# Patient Record
Sex: FEMALE | Race: WHITE | NOT HISPANIC OR LATINO | Employment: FULL TIME | URBAN - METROPOLITAN AREA
[De-identification: names, ages, dates, MRNs, and addresses within clinical notes are randomized per-mention and may not be internally consistent; named-entity substitution may affect disease eponyms.]

---

## 2017-07-19 ENCOUNTER — EMPLOYEE HEALTH (OUTPATIENT)
Dept: OCCUPATIONAL MEDICINE | Facility: CLINIC | Age: 22
End: 2017-07-19

## 2017-07-19 ENCOUNTER — EH NON-PROVIDER (OUTPATIENT)
Dept: OCCUPATIONAL MEDICINE | Facility: CLINIC | Age: 22
End: 2017-07-19

## 2017-07-19 ENCOUNTER — HOSPITAL ENCOUNTER (OUTPATIENT)
Facility: MEDICAL CENTER | Age: 22
End: 2017-07-19
Attending: PREVENTIVE MEDICINE
Payer: COMMERCIAL

## 2017-07-19 VITALS
DIASTOLIC BLOOD PRESSURE: 66 MMHG | WEIGHT: 128 LBS | RESPIRATION RATE: 16 BRPM | SYSTOLIC BLOOD PRESSURE: 108 MMHG | TEMPERATURE: 98.7 F | OXYGEN SATURATION: 98 % | BODY MASS INDEX: 24.17 KG/M2 | HEART RATE: 73 BPM | HEIGHT: 61 IN

## 2017-07-19 DIAGNOSIS — Z02.89 VISIT FOR OCCUPATIONAL HEALTH EXAMINATION: ICD-10-CM

## 2017-07-19 DIAGNOSIS — Z02.1 PRE-EMPLOYMENT DRUG SCREENING: ICD-10-CM

## 2017-07-19 LAB
AMP AMPHETAMINE: NORMAL
BAR BARBITURATES: NORMAL
BZO BENZODIAZEPINES: NORMAL
COC COCAINE: NORMAL
INT CON NEG: NORMAL
INT CON POS: NORMAL
MDMA ECSTASY: NORMAL
MET METHAMPHETAMINES: NORMAL
MTD METHADONE: NORMAL
OPI OPIATES: NORMAL
OXY OXYCODONE: NORMAL
PCP PHENCYCLIDINE: NORMAL
POC URINE DRUG SCREEN OCDRS: NEGATIVE
THC: NORMAL

## 2017-07-19 PROCEDURE — 80305 DRUG TEST PRSMV DIR OPT OBS: CPT | Performed by: PREVENTIVE MEDICINE

## 2017-07-19 PROCEDURE — 86480 TB TEST CELL IMMUN MEASURE: CPT | Performed by: PREVENTIVE MEDICINE

## 2017-07-19 PROCEDURE — 94375 RESPIRATORY FLOW VOLUME LOOP: CPT | Performed by: PREVENTIVE MEDICINE

## 2017-07-19 PROCEDURE — 8915 PR COMPREHENSIVE PHYSICAL: Performed by: PREVENTIVE MEDICINE

## 2017-07-19 NOTE — MR AVS SNAPSHOT
[facility]   2017 9:10 AM   Employee Health   MRN: [id number]    Department:  [facility]   Dept Phone:  [phone]    Description:  Female : 1995   Provider:  Skip Milner M.D.           Allergies as of 2017     Allergen Noted Reactions    Tetracyclines 2014   Rash      You were diagnosed with     Visit for occupational health examination   [[id number]]         Vital Signs     Smoking Status                   Never Smoker            Basic Information     Date Of Birth Sex Race Ethnicity Preferred Language    1995 Female White Non- English      Problem List              ICD-10-CM Priority Class Noted - Resolved    Asthma in adult J45.909   2014 - Present    Deviated nasal septum J34.2   8/3/2015 - Present    Chronic maxillary sinusitis J32.0   8/3/2015 - Present    Chronic ethmoidal sinusitis J32.2   8/3/2015 - Present    Hypertrophy of nasal turbinates J34.3   8/3/2015 - Present    Cyst of bone, localized M85.69   8/3/2015 - Present      Health Maintenance        Date Due Completion Dates    IMM HEP B VACCINE (1 of 3 - Primary Series) 1994 ---    IMM HEP A VACCINE (1 of 2 - Standard Series) 1995 ---    IMM HPV VACCINE (1 of 3 - Female 3 Dose Series) 2005 ---    IMM VARICELLA (CHICKENPOX) VACCINE (1 of 2 - 2 Dose Adolescent Series) 2007 ---    IMM DTaP/Tdap/Td Vaccine (1 - Tdap) 2013 ---    IMM PNEUMOCOCCAL 19-64 (ADULT) MEDIUM RISK SERIES (1 of 1 - PPSV23) 2013 ---    PAP SMEAR 2015 ---    IMM INFLUENZA (1) 2017 ---            Current Immunizations     Hepatitis B Vaccine Recombivax (Adol/Adult) 1/10/2017, 2016, 2014    MMR Vaccine 1995, 10/8/1995    Tdap Vaccine 2014      Below and/or attached are the medications your provider expects you to take. Review all of your home medications and newly ordered medications with your provider and/or pharmacist. Follow medication instructions as  directed by your provider and/or pharmacist. Please keep your medication list with you and share with your provider. Update the information when medications are discontinued, doses are changed, or new medications (including over-the-counter products) are added; and carry medication information at all times in the event of emergency situations     Allergies:  TETRACYCLINES - Rash               Medications  Valid as of: July 19, 2017 -  9:54 AM    Generic Name Brand Name Tablet Size Instructions for use    Fexofenadine HCl (Tab) ALLEGRA 60 MG Take 60 mg by mouth every day.        Hydrocodone-Acetaminophen (Tab) NORCO 7.5-325 MG Take 1 Tab by mouth every 6 hours as needed for Moderate Pain.        Ondansetron (TABLET DISPERSIBLE) ZOFRAN ODT 4 MG Take 1 Tab by mouth every 6 hours as needed for Nausea/Vomiting.        .                 Medicines prescribed today were sent to:     [id number] - [place], [place] - [address]    [address] [place] [address]    Phone: [phone] Fax: [phone]    Open 24 Hours?: No      Medication refill instructions:       If your prescription bottle indicates you have medication refills left, it is not necessary to call your provider’s office. Please contact your pharmacy and they will refill your medication.    If your prescription bottle indicates you do not have any refills left, you may request refills at any time through one of the following ways: The online [facility] App system (except Urgent Care), by calling your provider’s office, or by asking your pharmacy to contact your provider’s office with a refill request. Medication refills are processed only during regular business hours and may not be available until the next business day. Your provider may request additional information or to have a follow-up visit with you prior to refilling your medication.   *Please Note: Medication refills are assigned a new Rx number when refilled electronically. Your pharmacy may  indicate that no refills were authorized even though a new prescription for the same medication is available at the pharmacy. Please request the medicine by name with the pharmacy before contacting your provider for a refill.           [employer] Access Code: Activation code not generated  Current [employer] Status: Active

## 2017-07-21 LAB
M TB TUBERC IFN-G BLD QL: NEGATIVE
M TB TUBERC IFN-G/MITOGEN IGNF BLD: 0.08
M TB TUBERC IGNF/MITOGEN IGNF CONTROL: 37.22 [IU]/ML
MITOGEN IGNF BCKGRD COR BLD-ACNC: 0.02 [IU]/ML

## 2017-09-06 ENCOUNTER — TELEPHONE (OUTPATIENT)
Dept: OCCUPATIONAL MEDICINE | Facility: CLINIC | Age: 22
End: 2017-09-06

## 2017-09-28 ENCOUNTER — HOSPITAL ENCOUNTER (OUTPATIENT)
Dept: LAB | Facility: MEDICAL CENTER | Age: 22
End: 2017-09-28
Payer: COMMERCIAL

## 2017-09-28 LAB
BDY FAT % MEASURED: 23.8 %
BP DIAS: 72 MMHG
BP SYS: 117 MMHG
CHOLEST SERPL-MCNC: 183 MG/DL (ref 100–199)
DIABETES HTDIA: NO
EVENT NAME HTEVT: NORMAL
FASTING HTFAS: YES
GLUCOSE SERPL-MCNC: 71 MG/DL (ref 65–99)
HDLC SERPL-MCNC: 51 MG/DL
HYPERTENSION HTHYP: NO
LDLC SERPL CALC-MCNC: 115 MG/DL
SCREENING LOC CITY HTCIT: NORMAL
SCREENING LOC STATE HTSTA: NORMAL
SCREENING LOCATION HTLOC: NORMAL
SMOKING HTSMO: NO
SUBSCRIBER ID HTSID: NORMAL
TRIGL SERPL-MCNC: 85 MG/DL (ref 0–149)

## 2017-09-28 PROCEDURE — 82947 ASSAY GLUCOSE BLOOD QUANT: CPT

## 2017-09-28 PROCEDURE — S5190 WELLNESS ASSESSMENT BY NONPH: HCPCS

## 2017-09-28 PROCEDURE — 36415 COLL VENOUS BLD VENIPUNCTURE: CPT

## 2017-09-28 PROCEDURE — 80061 LIPID PANEL: CPT

## 2017-09-29 ENCOUNTER — EH NON-PROVIDER (OUTPATIENT)
Dept: OCCUPATIONAL MEDICINE | Facility: CLINIC | Age: 22
End: 2017-09-29

## 2017-09-29 DIAGNOSIS — Z29.89 NEED FOR ISOLATION: ICD-10-CM

## 2017-09-29 PROCEDURE — 94375 RESPIRATORY FLOW VOLUME LOOP: CPT

## 2017-10-19 ENCOUNTER — OFFICE VISIT (OUTPATIENT)
Dept: MEDICAL GROUP | Facility: MEDICAL CENTER | Age: 22
End: 2017-10-19
Payer: COMMERCIAL

## 2017-10-19 ENCOUNTER — HOSPITAL ENCOUNTER (OUTPATIENT)
Facility: MEDICAL CENTER | Age: 22
End: 2017-10-19
Attending: INTERNAL MEDICINE
Payer: COMMERCIAL

## 2017-10-19 VITALS
OXYGEN SATURATION: 95 % | SYSTOLIC BLOOD PRESSURE: 112 MMHG | TEMPERATURE: 97.9 F | HEIGHT: 62 IN | DIASTOLIC BLOOD PRESSURE: 62 MMHG | HEART RATE: 60 BPM | BODY MASS INDEX: 23.37 KG/M2 | WEIGHT: 127 LBS

## 2017-10-19 DIAGNOSIS — Z76.89 ENCOUNTER TO ESTABLISH CARE: ICD-10-CM

## 2017-10-19 DIAGNOSIS — Z00.00 HEALTH CARE MAINTENANCE: ICD-10-CM

## 2017-10-19 DIAGNOSIS — L70.0 ACNE VULGARIS: ICD-10-CM

## 2017-10-19 DIAGNOSIS — Z11.8 SCREENING FOR CHLAMYDIAL DISEASE: ICD-10-CM

## 2017-10-19 DIAGNOSIS — Z79.899 HIGH RISK MEDICATION USE: ICD-10-CM

## 2017-10-19 DIAGNOSIS — J30.89 PERENNIAL SINUSITIS: ICD-10-CM

## 2017-10-19 LAB
INT CON NEG: NEGATIVE
INT CON POS: POSITIVE
POC URINE PREGNANCY TEST: NORMAL

## 2017-10-19 PROCEDURE — 81025 URINE PREGNANCY TEST: CPT | Performed by: INTERNAL MEDICINE

## 2017-10-19 PROCEDURE — 87491 CHLMYD TRACH DNA AMP PROBE: CPT

## 2017-10-19 PROCEDURE — 87591 N.GONORRHOEAE DNA AMP PROB: CPT

## 2017-10-19 PROCEDURE — 99214 OFFICE O/P EST MOD 30 MIN: CPT | Performed by: INTERNAL MEDICINE

## 2017-10-19 RX ORDER — ISOTRETINOIN 40 MG/1
40 CAPSULE ORAL 2 TIMES DAILY
Qty: 60 CAP | Refills: 0 | Status: ON HOLD | OUTPATIENT
Start: 2017-10-19 | End: 2018-12-04

## 2017-10-19 RX ORDER — MINOCYCLINE HYDROCHLORIDE 100 MG/1
CAPSULE ORAL
Qty: 60 CAP | Refills: 0 | Status: ON HOLD | OUTPATIENT
Start: 2017-10-19 | End: 2018-12-04

## 2017-10-19 NOTE — PROGRESS NOTES
CHIEF COMPLAINT  Chief Complaint   Patient presents with   • Referral Needed     derm     HPI  Patient is a 22 y.o. female patient who presents today for the following     Perennial allergies, sinusits  Onset: 10 years   Complains of nasal , congestion, sneezing, sinus problems throughout the year.   She had in the past, allergy testing, immunotherapy, without improvement.   On Allegra daily.   Family history of allergies: Negative    Acne  Onset:  ~6 years ago;  - Location: Mainly the face slightly on both upper arms and back  - Symptoms: Inflammatory acne  - Change with food or menstrual cycle: Unknown  Previous treatment:   - [facility] [place] 3 years ago:   -abx:    - tried doxycycline, clindamycin and topical retinoid cream w/o help   - stopped all medications 1 year ago.  She requested accutane.    Reviewed PMH, PSH, FH, SH, ALL, HCM/IMM, no changes  Reviewed MEDS, no changes    Patient Active Problem List    Diagnosis Date Noted   • Deviated nasal septum 08/03/2015   • Chronic maxillary sinusitis 08/03/2015   • Chronic ethmoidal sinusitis 08/03/2015   • Hypertrophy of nasal turbinates 08/03/2015   • Cyst of bone, localized 08/03/2015   • Asthma in adult 02/27/2014     CURRENT MEDICATIONS  Current Outpatient Prescriptions   Medication Sig Dispense Refill   • minocycline (MINOCIN) 100 MG Cap Take 1 caps BID x 10 days, then QD for 1 month 60 Cap 0   • isotretinoin (ACCUTANE) 40 MG capsule Take 1 Cap by mouth 2 times a day. 60 Cap 0   • fexofenadine (ALLEGRA) 60 MG TABS Take 60 mg by mouth every day.       No current facility-administered medications for this visit.      ALLERGIES  Allergies: Tetracyclines  PAST MEDICAL HISTORY  Past Medical History:   Diagnosis Date   • Acne    • Asthma     inhaler as needed   • Snoring     no sleep study     SURGICAL HISTORY  She  has a past surgical history that includes other (5/25/2012); septoplasty (Bilateral, 8/3/2015); turbinoplasty (Bilateral, 8/3/2015);  "ethmoidectomy (Bilateral, 8/3/2015); and antrostomy (Bilateral, 8/3/2015).  SOCIAL HISTORY  Social History   Substance Use Topics   • Smoking status: Never Smoker   • Smokeless tobacco: Never Used   • Alcohol use Not on file     Social History     Social History Narrative   • No narrative on file     FAMILY HISTORY  Family History   Problem Relation Age of Onset   • No Known Problems Mother    • No Known Problems Father      Family Status   Relation Status   • Mother Alive   • Father Alive       ROS   Constitutional: Negative for fever, chills.  HENT: as above.  Eyes: Negative for blurred vision.   Respiratory: Negative for cough, shortness of breath.  Cardiovascular: Negative for chest pain, palpitations.   Gastrointestinal: Negative for heartburn, nausea, abdominal pain.   Genitourinary: Negative for dysuria.  Musculoskeletal: Negative for significant myalgias, back pain and joint pain.   Skin: as above.  Neuro: Negative for dizziness, weakness and headaches.   Endo/Heme/Allergies: Does not bruise/bleed easily.   Psychiatric/Behavioral: Negative for depression, anxiety    PHYSICAL EXAM   Blood pressure 112/62, pulse 60, temperature 36.6 °C (97.9 °F), height 1.575 m (5' 2\"), weight 57.6 kg (127 lb), last menstrual period 10/09/2017, SpO2 95 %, not currently breastfeeding. Body mass index is 23.23 kg/m².  General:  NAD, well appearing  HEENT:   NC/AT, PERRLA, EOMI, TMs are clear. Oropharyngeal mucosa is pink,  without lesions;  no cervical / supraclavicular  lymphadenopathy, no thyromegaly.    Cardiovascular: RRR.   No m/r/g. No carotid bruits .      Lungs:   CTAB, no w/r/r, no respiratory distress.  Abdomen: Soft, NT/ND + BS; no suprapubic tenderness; no masses or hepatosplenomegaly.  Extremities:  2+ DP and radial pulses bilaterally.  No c/c/e.   Skin:  Warm, dry.    Inflammatory acne on face.  Neurologic: Alert & oriented x 3. No focal deficits.  Psychiatric:  Affect normal, mood normal, judgment normal.    LABS "     Labs are reviewed and discussed with a patient    Lab Results   Component Value Date/Time    CHOLSTRLTOT 183 09/28/2017 11:25 AM     (H) 09/28/2017 11:25 AM    HDL 51 09/28/2017 11:25 AM    TRIGLYCERIDE 85 09/28/2017 11:25 AM       Lab Results   Component Value Date/Time    GLUCOSE 71 09/28/2017 11:25 AM     IMAGING     None    ASSESMENT AND PLAN        1. Perennial sinusitis  Stable, continue current treatment, ENT follow-up    2. Acne vulgaris  Triglycerides were low  She has nexplanone    - REFERRAL TO DERMATOLOGY  - POCT Pregnancy  - COMP METABOLIC PANEL; Future  - LIPID PROFILE; Future  - CBC WITH DIFFERENTIAL; Future    3. High risk medication use  Pending labs  - Will decide about Accutane use - in a contact with pharmacy  - COMP METABOLIC PANEL; Future  - LIPID PROFILE; Future  - CBC WITH DIFFERENTIAL; Future    4. Screening for chlamydial disease  - CHLAMYDIA/GC PCR URINE OR SWAB; Future    5. Encounter to establish care  Reviewed PMH, PSH, FH, SH, ALL, MEDS, HCM/IMM.   Advised healthy habits, diet, exercise.    6. Health care maintenance  Pending records    Counseling:   - Smoking:  Nonsmoker    Followup: Return in about 2 months (around 12/19/2017) for Short.    All questions are answered.    Please note that this dictation was created using voice recognition software, and that there might be errors of ciro and possibly content.

## 2017-10-21 ENCOUNTER — HOSPITAL ENCOUNTER (OUTPATIENT)
Dept: LAB | Facility: MEDICAL CENTER | Age: 22
End: 2017-10-21
Attending: INTERNAL MEDICINE
Payer: COMMERCIAL

## 2017-10-21 DIAGNOSIS — Z79.899 HIGH RISK MEDICATION USE: ICD-10-CM

## 2017-10-21 DIAGNOSIS — Z11.8 SCREENING FOR CHLAMYDIAL DISEASE: ICD-10-CM

## 2017-10-21 DIAGNOSIS — L70.0 ACNE VULGARIS: ICD-10-CM

## 2017-10-21 LAB
ALBUMIN SERPL BCP-MCNC: 4.7 G/DL (ref 3.2–4.9)
ALBUMIN/GLOB SERPL: 1.6 G/DL
ALP SERPL-CCNC: 45 U/L (ref 30–99)
ALT SERPL-CCNC: 14 U/L (ref 2–50)
ANION GAP SERPL CALC-SCNC: 9 MMOL/L (ref 0–11.9)
AST SERPL-CCNC: 18 U/L (ref 12–45)
BASOPHILS # BLD AUTO: 0.8 % (ref 0–1.8)
BASOPHILS # BLD: 0.05 K/UL (ref 0–0.12)
BILIRUB SERPL-MCNC: 0.8 MG/DL (ref 0.1–1.5)
BUN SERPL-MCNC: 18 MG/DL (ref 8–22)
C TRACH DNA SPEC QL NAA+PROBE: NEGATIVE
CALCIUM SERPL-MCNC: 9.9 MG/DL (ref 8.5–10.5)
CHLORIDE SERPL-SCNC: 106 MMOL/L (ref 96–112)
CHOLEST SERPL-MCNC: 183 MG/DL (ref 100–199)
CO2 SERPL-SCNC: 26 MMOL/L (ref 20–33)
CREAT SERPL-MCNC: 0.9 MG/DL (ref 0.5–1.4)
EOSINOPHIL # BLD AUTO: 0.93 K/UL (ref 0–0.51)
EOSINOPHIL NFR BLD: 14.3 % (ref 0–6.9)
ERYTHROCYTE [DISTWIDTH] IN BLOOD BY AUTOMATED COUNT: 40.2 FL (ref 35.9–50)
GFR SERPL CREATININE-BSD FRML MDRD: >60 ML/MIN/1.73 M 2
GLOBULIN SER CALC-MCNC: 2.9 G/DL (ref 1.9–3.5)
GLUCOSE SERPL-MCNC: 76 MG/DL (ref 65–99)
HCT VFR BLD AUTO: 41.8 % (ref 37–47)
HDLC SERPL-MCNC: 47 MG/DL
HGB BLD-MCNC: 14.1 G/DL (ref 12–16)
IMM GRANULOCYTES # BLD AUTO: 0.01 K/UL (ref 0–0.11)
IMM GRANULOCYTES NFR BLD AUTO: 0.2 % (ref 0–0.9)
LDLC SERPL CALC-MCNC: 121 MG/DL
LYMPHOCYTES # BLD AUTO: 2.71 K/UL (ref 1–4.8)
LYMPHOCYTES NFR BLD: 41.6 % (ref 22–41)
MCH RBC QN AUTO: 31.6 PG (ref 27–33)
MCHC RBC AUTO-ENTMCNC: 33.7 G/DL (ref 33.6–35)
MCV RBC AUTO: 93.7 FL (ref 81.4–97.8)
MONOCYTES # BLD AUTO: 0.47 K/UL (ref 0–0.85)
MONOCYTES NFR BLD AUTO: 7.2 % (ref 0–13.4)
N GONORRHOEA DNA SPEC QL NAA+PROBE: NEGATIVE
NEUTROPHILS # BLD AUTO: 2.35 K/UL (ref 2–7.15)
NEUTROPHILS NFR BLD: 35.9 % (ref 44–72)
NRBC # BLD AUTO: 0 K/UL
NRBC BLD AUTO-RTO: 0 /100 WBC
PLATELET # BLD AUTO: 204 K/UL (ref 164–446)
PMV BLD AUTO: 10.8 FL (ref 9–12.9)
POTASSIUM SERPL-SCNC: 4 MMOL/L (ref 3.6–5.5)
PROT SERPL-MCNC: 7.6 G/DL (ref 6–8.2)
RBC # BLD AUTO: 4.46 M/UL (ref 4.2–5.4)
SODIUM SERPL-SCNC: 141 MMOL/L (ref 135–145)
SPECIMEN SOURCE: NORMAL
TRIGL SERPL-MCNC: 75 MG/DL (ref 0–149)
WBC # BLD AUTO: 6.5 K/UL (ref 4.8–10.8)

## 2017-10-21 PROCEDURE — 80053 COMPREHEN METABOLIC PANEL: CPT

## 2017-10-21 PROCEDURE — 85025 COMPLETE CBC W/AUTO DIFF WBC: CPT

## 2017-10-21 PROCEDURE — 36415 COLL VENOUS BLD VENIPUNCTURE: CPT

## 2017-10-21 PROCEDURE — 80061 LIPID PANEL: CPT

## 2017-10-24 ENCOUNTER — TELEPHONE (OUTPATIENT)
Dept: MEDICAL GROUP | Facility: MEDICAL CENTER | Age: 22
End: 2017-10-24

## 2017-10-24 NOTE — TELEPHONE ENCOUNTER
Please, notify pt that I was unable to pursue with accutane because of some regulations/requirements that I do not have: it has to be given by derm.   Dr Bentley     Phone Number Called: [phone] (home)     Message: Notified patient of the message and she states dermatology is not able to get her in for 6 months. Patient states she did contact another dermatology who just needed a referral to get her in next week. Patient will call back if she needs us to call.    Left Message for patient to call back: no

## 2017-10-24 NOTE — TELEPHONE ENCOUNTER
----- Message from Jessie Manley M.D. sent at 10/23/2017  8:31 PM PDT -----  Please, notify the patient that labs are reviewed, and will be discussed at OV, thanks, Dr Bentley

## 2017-10-24 NOTE — TELEPHONE ENCOUNTER
Phone Number Called: [phone] (home)     Message: Notified patient of the message and she stated understanding. Patient asked if [facility] has contacted provider as she needs to be certified to prescribe Accutane. Please advise    Left Message for patient to call back: no

## 2017-11-09 ENCOUNTER — HOSPITAL ENCOUNTER (EMERGENCY)
Facility: MEDICAL CENTER | Age: 22
End: 2017-11-09
Attending: EMERGENCY MEDICINE
Payer: COMMERCIAL

## 2017-11-09 VITALS
BODY MASS INDEX: 24.56 KG/M2 | HEART RATE: 79 BPM | DIASTOLIC BLOOD PRESSURE: 74 MMHG | TEMPERATURE: 97.9 F | WEIGHT: 130.07 LBS | SYSTOLIC BLOOD PRESSURE: 132 MMHG | HEIGHT: 61 IN | OXYGEN SATURATION: 95 % | RESPIRATION RATE: 18 BRPM

## 2017-11-09 DIAGNOSIS — T78.40XA ALLERGIC REACTION, INITIAL ENCOUNTER: ICD-10-CM

## 2017-11-09 PROCEDURE — 99284 EMERGENCY DEPT VISIT MOD MDM: CPT

## 2017-11-09 PROCEDURE — 700102 HCHG RX REV CODE 250 W/ 637 OVERRIDE(OP): Performed by: EMERGENCY MEDICINE

## 2017-11-09 PROCEDURE — 700111 HCHG RX REV CODE 636 W/ 250 OVERRIDE (IP)

## 2017-11-09 PROCEDURE — 94640 AIRWAY INHALATION TREATMENT: CPT

## 2017-11-09 PROCEDURE — 700101 HCHG RX REV CODE 250: Performed by: EMERGENCY MEDICINE

## 2017-11-09 PROCEDURE — A9270 NON-COVERED ITEM OR SERVICE: HCPCS | Performed by: EMERGENCY MEDICINE

## 2017-11-09 RX ORDER — HYDROXYZINE HYDROCHLORIDE 25 MG/1
25 TABLET, FILM COATED ORAL 3 TIMES DAILY PRN
Qty: 30 TAB | Refills: 0 | Status: ON HOLD | OUTPATIENT
Start: 2017-11-09 | End: 2018-12-04

## 2017-11-09 RX ORDER — EPINEPHRINE 0.3 MG/.3ML
0.3 INJECTION SUBCUTANEOUS
Qty: 1 EACH | Refills: 0 | Status: ON HOLD | OUTPATIENT
Start: 2017-11-09 | End: 2018-12-04

## 2017-11-09 RX ORDER — PREDNISONE 20 MG/1
60 TABLET ORAL DAILY
Status: DISCONTINUED | OUTPATIENT
Start: 2017-11-09 | End: 2017-11-09 | Stop reason: HOSPADM

## 2017-11-09 RX ORDER — HYDROXYZINE HYDROCHLORIDE 25 MG/1
50 TABLET, FILM COATED ORAL ONCE
Status: COMPLETED | OUTPATIENT
Start: 2017-11-09 | End: 2017-11-09

## 2017-11-09 RX ORDER — ALBUTEROL SULFATE 90 UG/1
2 AEROSOL, METERED RESPIRATORY (INHALATION) EVERY 6 HOURS PRN
Qty: 1 INHALER | Refills: 0 | Status: ON HOLD | OUTPATIENT
Start: 2017-11-09 | End: 2018-12-04

## 2017-11-09 RX ORDER — PREDNISONE 20 MG/1
TABLET ORAL
Status: COMPLETED
Start: 2017-11-09 | End: 2017-11-09

## 2017-11-09 RX ADMIN — ALBUTEROL SULFATE 2.5 MG: 2.5 SOLUTION RESPIRATORY (INHALATION) at 04:11

## 2017-11-09 RX ADMIN — PREDNISONE 60 MG: 20 TABLET ORAL at 04:23

## 2017-11-09 RX ADMIN — HYDROXYZINE HYDROCHLORIDE 50 MG: 25 TABLET, FILM COATED ORAL at 04:22

## 2017-11-09 ASSESSMENT — PAIN SCALES - GENERAL: PAINLEVEL_OUTOF10: 2

## 2017-11-09 NOTE — ED NOTES
Chief Complaint   Patient presents with   • Allergic Reaction     Pt had rash that started yesterday generalized. Pt states she woke up about 1 hour prior to coming to ER with complaints of SOB, chest pain, and increased itching rash. Pt states took allegra, and topical benadryl cream and reports wheezing while on route to ER.     Pt denies any new medications or any new foods.

## 2017-11-09 NOTE — ED NOTES
Discharge instructions provided.  [facility]3 [facility] to [facility] and pt informed and educated on how and when to take medications, Pt verbalized the understanding of discharge instructions to follow up with PCP and to return to ER if condition worsens.  Pt ambulated out of ER without difficulty.

## 2017-11-09 NOTE — FLOWSHEET NOTE
11/09/17 0412   Events/Summary/Plan   Events/Summary/Plan ER SVN   Interdisciplinary Plan of Care-Goals (Indications)   Obstructive Ventilatory Defect or Pulmonary Disease without Obvious Obstruction Strong Subjective / Objective Improvement;History / Diagnosis   Interdisciplinary Plan of Care-Outcomes    Bronchodilator Outcome Diminished Wheezing and Volume of Air Movement Increased   Education   Education Yes - Pt. / Family has been Instructed in use of Respiratory Equipment;Yes - Pt. / Family has been Instructed in use of Respiratory Medications and Adverse Reactions   RT Assessment of Delivered Medications   Evaluation of Medication Delivery Daily Yes-- Pt /Family has been Instructed in use of Respiratory Medications and Adverse Reactions   SVN Group   #SVN Performed Yes   Given By: Mouthpiece   Date SVN Last Changed 11/09/17   Date SVN Next Change Due (Q 7 Days) 11/16/17   Respiratory WDL   Respiratory (WDL) X   Chest Exam   Respiration 18   Pulse 68   Breath Sounds   Pre/Post Intervention Post Intervention Assessment   RUL Breath Sounds Clear   RML Breath Sounds Clear   RLL Breath Sounds Clear   DANNY Breath Sounds Clear   LLL Breath Sounds Clear   Oximetry   Continuous Oximetry Yes   Oxygen   Pulse Oximetry 95 %   O2 Daily Delivery Respiratory  Room Air with O2 Available

## 2017-11-09 NOTE — ED PROVIDER NOTES
ED Provider Note    CHIEF COMPLAINT  Chief Complaint   Patient presents with   • Allergic Reaction       HPI  Marilin Mayfield is a 22 y.o. female who presents With rash wheezing and shortness of breath that began early this morning. Patient reports she woke up with a rash and then shortly developed wheezing thereafter. Denies any new detergents, new soaps, new exposures otherwise, or new medications. She denies any new foods. She has a history of chronic sinusitis but is not currently on any medications for this. Patient denies any decreased exertional capacity or shortness of breath on exertion. Patient denies any michelle chest pain. Patient reports minor nonproductive cough. No fever or constitutional symptoms. LMP was 2 days ago    REVIEW OF SYSTEMS  Review of Systems   All other systems reviewed and are negative.    See HPI for further details. All other systems are negative.     PAST MEDICAL HISTORY   has a past medical history of Acne; Asthma; and Snoring.    SOCIAL HISTORY  Social History     Social History Main Topics   • Smoking status: Never Smoker   • Smokeless tobacco: Never Used   • Alcohol use Not on file   • Drug use: Unknown   • Sexual activity: Not on file       SURGICAL HISTORY   has a past surgical history that includes other (5/25/2012); septoplasty (Bilateral, 8/3/2015); turbinoplasty (Bilateral, 8/3/2015); ethmoidectomy (Bilateral, 8/3/2015); and antrostomy (Bilateral, 8/3/2015).    CURRENT MEDICATIONS  Home Medications     Reviewed by Tri Becerra R.N. (Registered Nurse) on 11/09/17 at 0346  Med List Status: <None>   Medication Last Dose Status   fexofenadine (ALLEGRA) 60 MG TABS 7/27/15 Active   isotretinoin (ACCUTANE) 40 MG capsule  Active   minocycline (MINOCIN) 100 MG Cap  Active                ALLERGIES  Allergies   Allergen Reactions   • Tetracyclines Rash   • Other Drug Anaphylaxis     Steroids       PHYSICAL EXAM  Physical Exam   Constitutional: She is oriented to  person, place, and time. She appears well-developed and well-nourished.   HENT:   Head: Normocephalic.   Mouth/Throat: Oropharynx is clear and moist. No oropharyngeal exudate.   Neck: Normal range of motion. Neck supple.   Cardiovascular: Normal rate, regular rhythm and normal heart sounds.    Pulmonary/Chest: Effort normal. No respiratory distress. She has wheezes. She has no rales.   Scattered wheezes throughout   Abdominal: Soft. Bowel sounds are normal. There is no tenderness.   Neurological: She is alert and oriented to person, place, and time.   Skin: Skin is warm.         COURSE & MEDICAL DECISION MAKING  Pertinent Labs & Imaging studies reviewed. (See chart for details)  Patient with likely allergic reaction to unknown antigen. Treating with steroids, albuterol, histamine blockers. We'll reassess to ensure patient's resolution. Home with Atarax when necessary and EpiPen.  Cardiopulmonary etiology otherwise is unlikely in this well-appearing patient with normal vitals, wheezing on exam and rash consistent with allergic reaction  Patient's lungs are clear, patient reports symptoms have resolved. Patient to be discharged home with supportive care for allergic reaction. I asked patient that on her way home she becomes drowsy to pull over and call for a ride.  The patient will not drink alcohol nor drive with prescribed medications. The patient will return for worsening symptoms and is stable at the time of discharge. The patient verbalizes understanding and will comply.    FINAL IMPRESSION  1. Allergic reaction          Electronically signed by: Dano Posada, 11/9/2017 4:03 AM

## 2017-11-09 NOTE — DISCHARGE INSTRUCTIONS
Allergies  An allergy is when your body reacts to a substance in a way that is not normal. An allergic reaction can happen after you:  · Eat something.  · Breathe in something.  · Touch something.  WHAT KINDS OF ALLERGIES ARE THERE?  You can be allergic to:  · Things that are only around during certain seasons, like molds and pollens.  · Foods.  · Drugs.  · Insects.  · Animal dander.  WHAT ARE SYMPTOMS OF ALLERGIES?  · Puffiness (swelling). This may happen on the lips, face, tongue, mouth, or throat.  · Sneezing.  · Coughing.  · Breathing loudly (wheezing).  · Stuffy nose.  · Tingling in the mouth.  · A rash.  · Itching.  · Itchy, red, puffy areas of skin (hives).  · Watery eyes.  · Throwing up (vomiting).  · Watery poop (diarrhea).  · Dizziness.  · Feeling faint or fainting.  · Trouble breathing or swallowing.  · A tight feeling in the chest.  · A fast heartbeat.  HOW ARE ALLERGIES DIAGNOSED?  Allergies can be diagnosed with:  · A medical and family history.  · Skin tests.  · Blood tests.  · A food diary. A food diary is a record of all the foods, drinks, and symptoms you have each day.  · The results of an elimination diet. This diet involves making sure not to eat certain foods and then seeing what happens when you start eating them again.  HOW ARE ALLERGIES TREATED?  There is no cure for allergies, but allergic reactions can be treated with medicine. Severe reactions usually need to be treated at a hospital.   HOW CAN REACTIONS BE PREVENTED?  The best way to prevent an allergic reaction is to avoid the thing you are allergic to. Allergy shots and medicines can also help prevent reactions in some cases.     This information is not intended to replace advice given to you by your health care provider. Make sure you discuss any questions you have with your health care provider.     Document Released: 04/14/2014 Document Revised: 01/08/2016 Document Reviewed: 09/29/2015  [employer] Interactive Patient Education ©2016  [employer]

## 2018-01-23 ENCOUNTER — TELEPHONE (OUTPATIENT)
Dept: MEDICAL GROUP | Facility: MEDICAL CENTER | Age: 23
End: 2018-01-23

## 2018-01-24 NOTE — TELEPHONE ENCOUNTER
Pt called and would like to know if we can prescribe her medication for Fever blisters. States she has taken antibiotics for them previously. Please advise,     Thank you,     Wilian Malagon

## 2018-01-24 NOTE — TELEPHONE ENCOUNTER
Phone Number Called: [phone] (home)       Message: Patient will call back to schedule appt.     Left Message for patient to call back: yes

## 2018-01-27 ENCOUNTER — HOSPITAL ENCOUNTER (OUTPATIENT)
Dept: LAB | Facility: MEDICAL CENTER | Age: 23
End: 2018-01-27
Attending: PHYSICIAN ASSISTANT
Payer: COMMERCIAL

## 2018-01-27 LAB
ALBUMIN SERPL BCP-MCNC: 5.1 G/DL (ref 3.2–4.9)
ALP SERPL-CCNC: 46 U/L (ref 30–99)
ALT SERPL-CCNC: 76 U/L (ref 2–50)
AST SERPL-CCNC: 209 U/L (ref 12–45)
BASOPHILS # BLD AUTO: 0.8 % (ref 0–1.8)
BASOPHILS # BLD: 0.06 K/UL (ref 0–0.12)
BILIRUB CONJ SERPL-MCNC: 0.1 MG/DL (ref 0.1–0.5)
BILIRUB INDIRECT SERPL-MCNC: 0.4 MG/DL (ref 0–1)
BILIRUB SERPL-MCNC: 0.5 MG/DL (ref 0.1–1.5)
CHOLEST SERPL-MCNC: 182 MG/DL (ref 100–199)
EOSINOPHIL # BLD AUTO: 0.51 K/UL (ref 0–0.51)
EOSINOPHIL NFR BLD: 6.9 % (ref 0–6.9)
ERYTHROCYTE [DISTWIDTH] IN BLOOD BY AUTOMATED COUNT: 42.9 FL (ref 35.9–50)
HCG SERPL QL: NEGATIVE
HCG UR QL: NEGATIVE
HCT VFR BLD AUTO: 40.3 % (ref 37–47)
HDLC SERPL-MCNC: 53 MG/DL
HGB BLD-MCNC: 13.4 G/DL (ref 12–16)
IMM GRANULOCYTES # BLD AUTO: 0.02 K/UL (ref 0–0.11)
IMM GRANULOCYTES NFR BLD AUTO: 0.3 % (ref 0–0.9)
LDLC SERPL CALC-MCNC: 119 MG/DL
LYMPHOCYTES # BLD AUTO: 2.33 K/UL (ref 1–4.8)
LYMPHOCYTES NFR BLD: 31.7 % (ref 22–41)
MCH RBC QN AUTO: 31.1 PG (ref 27–33)
MCHC RBC AUTO-ENTMCNC: 33.3 G/DL (ref 33.6–35)
MCV RBC AUTO: 93.5 FL (ref 81.4–97.8)
MONOCYTES # BLD AUTO: 0.51 K/UL (ref 0–0.85)
MONOCYTES NFR BLD AUTO: 6.9 % (ref 0–13.4)
NEUTROPHILS # BLD AUTO: 3.92 K/UL (ref 2–7.15)
NEUTROPHILS NFR BLD: 53.4 % (ref 44–72)
NRBC # BLD AUTO: 0 K/UL
NRBC BLD-RTO: 0 /100 WBC
PLATELET # BLD AUTO: 214 K/UL (ref 164–446)
PMV BLD AUTO: 10.8 FL (ref 9–12.9)
PROT SERPL-MCNC: 7.6 G/DL (ref 6–8.2)
RBC # BLD AUTO: 4.31 M/UL (ref 4.2–5.4)
SP GR UR REFRACTOMETRY: 1.02
TRIGL SERPL-MCNC: 48 MG/DL (ref 0–149)
WBC # BLD AUTO: 7.4 K/UL (ref 4.8–10.8)

## 2018-01-27 PROCEDURE — 84703 CHORIONIC GONADOTROPIN ASSAY: CPT

## 2018-01-27 PROCEDURE — 81025 URINE PREGNANCY TEST: CPT

## 2018-01-27 PROCEDURE — 80076 HEPATIC FUNCTION PANEL: CPT

## 2018-01-27 PROCEDURE — 36415 COLL VENOUS BLD VENIPUNCTURE: CPT

## 2018-01-27 PROCEDURE — 80061 LIPID PANEL: CPT

## 2018-01-27 PROCEDURE — 85025 COMPLETE CBC W/AUTO DIFF WBC: CPT

## 2018-02-01 ENCOUNTER — APPOINTMENT (OUTPATIENT)
Dept: MEDICAL GROUP | Facility: MEDICAL CENTER | Age: 23
End: 2018-02-01
Payer: COMMERCIAL

## 2018-02-01 ENCOUNTER — HOSPITAL ENCOUNTER (OUTPATIENT)
Dept: LAB | Facility: MEDICAL CENTER | Age: 23
End: 2018-02-01
Payer: COMMERCIAL

## 2018-02-01 LAB
ALBUMIN SERPL BCP-MCNC: 4.8 G/DL (ref 3.2–4.9)
ALP SERPL-CCNC: 44 U/L (ref 30–99)
ALT SERPL-CCNC: 55 U/L (ref 2–50)
AST SERPL-CCNC: 56 U/L (ref 12–45)
BILIRUB CONJ SERPL-MCNC: 0.1 MG/DL (ref 0.1–0.5)
BILIRUB INDIRECT SERPL-MCNC: 0.6 MG/DL (ref 0–1)
BILIRUB SERPL-MCNC: 0.7 MG/DL (ref 0.1–1.5)
PROT SERPL-MCNC: 8.1 G/DL (ref 6–8.2)

## 2018-02-01 PROCEDURE — 80074 ACUTE HEPATITIS PANEL: CPT

## 2018-02-01 PROCEDURE — 36415 COLL VENOUS BLD VENIPUNCTURE: CPT

## 2018-02-01 PROCEDURE — 80076 HEPATIC FUNCTION PANEL: CPT

## 2018-02-02 ENCOUNTER — APPOINTMENT (OUTPATIENT)
Dept: MEDICAL GROUP | Facility: MEDICAL CENTER | Age: 23
End: 2018-02-02
Payer: COMMERCIAL

## 2018-03-01 ENCOUNTER — HOSPITAL ENCOUNTER (OUTPATIENT)
Dept: LAB | Facility: MEDICAL CENTER | Age: 23
End: 2018-03-01
Attending: PHYSICIAN ASSISTANT
Payer: COMMERCIAL

## 2018-03-01 LAB
ALBUMIN SERPL BCP-MCNC: 4.7 G/DL (ref 3.2–4.9)
ALP SERPL-CCNC: 50 U/L (ref 30–99)
ALT SERPL-CCNC: 13 U/L (ref 2–50)
AST SERPL-CCNC: 18 U/L (ref 12–45)
B-HCG SERPL-ACNC: <0.6 MIU/ML (ref 0–5)
BASOPHILS # BLD AUTO: 1.1 % (ref 0–1.8)
BASOPHILS # BLD: 0.08 K/UL (ref 0–0.12)
BILIRUB CONJ SERPL-MCNC: <0.1 MG/DL (ref 0.1–0.5)
BILIRUB INDIRECT SERPL-MCNC: NORMAL MG/DL (ref 0–1)
BILIRUB SERPL-MCNC: 0.4 MG/DL (ref 0.1–1.5)
CHOLEST SERPL-MCNC: 196 MG/DL (ref 100–199)
EOSINOPHIL # BLD AUTO: 0.62 K/UL (ref 0–0.51)
EOSINOPHIL NFR BLD: 8.2 % (ref 0–6.9)
ERYTHROCYTE [DISTWIDTH] IN BLOOD BY AUTOMATED COUNT: 41.1 FL (ref 35.9–50)
HCG UR QL: NEGATIVE
HCT VFR BLD AUTO: 42.6 % (ref 37–47)
HDLC SERPL-MCNC: 41 MG/DL
HGB BLD-MCNC: 14 G/DL (ref 12–16)
IMM GRANULOCYTES # BLD AUTO: 0.01 K/UL (ref 0–0.11)
IMM GRANULOCYTES NFR BLD AUTO: 0.1 % (ref 0–0.9)
LDLC SERPL CALC-MCNC: 121 MG/DL
LYMPHOCYTES # BLD AUTO: 2.94 K/UL (ref 1–4.8)
LYMPHOCYTES NFR BLD: 39.1 % (ref 22–41)
MCH RBC QN AUTO: 30.6 PG (ref 27–33)
MCHC RBC AUTO-ENTMCNC: 32.9 G/DL (ref 33.6–35)
MCV RBC AUTO: 93 FL (ref 81.4–97.8)
MONOCYTES # BLD AUTO: 0.45 K/UL (ref 0–0.85)
MONOCYTES NFR BLD AUTO: 6 % (ref 0–13.4)
NEUTROPHILS # BLD AUTO: 3.42 K/UL (ref 2–7.15)
NEUTROPHILS NFR BLD: 45.5 % (ref 44–72)
NRBC # BLD AUTO: 0 K/UL
NRBC BLD-RTO: 0 /100 WBC
PLATELET # BLD AUTO: 240 K/UL (ref 164–446)
PMV BLD AUTO: 10.8 FL (ref 9–12.9)
PROT SERPL-MCNC: 8 G/DL (ref 6–8.2)
RBC # BLD AUTO: 4.58 M/UL (ref 4.2–5.4)
SP GR UR REFRACTOMETRY: 1.02
TRIGL SERPL-MCNC: 170 MG/DL (ref 0–149)
WBC # BLD AUTO: 7.5 K/UL (ref 4.8–10.8)

## 2018-03-01 PROCEDURE — 80076 HEPATIC FUNCTION PANEL: CPT

## 2018-03-01 PROCEDURE — 85025 COMPLETE CBC W/AUTO DIFF WBC: CPT

## 2018-03-01 PROCEDURE — 36415 COLL VENOUS BLD VENIPUNCTURE: CPT

## 2018-03-01 PROCEDURE — 80061 LIPID PANEL: CPT

## 2018-03-01 PROCEDURE — 81025 URINE PREGNANCY TEST: CPT

## 2018-03-01 PROCEDURE — 84702 CHORIONIC GONADOTROPIN TEST: CPT

## 2018-03-02 LAB — LDLC SERPL-MCNC: 147 MG/DL (ref 0–129)

## 2018-03-27 ENCOUNTER — HOSPITAL ENCOUNTER (OUTPATIENT)
Dept: LAB | Facility: MEDICAL CENTER | Age: 23
End: 2018-03-27
Attending: PHYSICIAN ASSISTANT
Payer: COMMERCIAL

## 2018-03-27 LAB
ALBUMIN SERPL BCP-MCNC: 4.4 G/DL (ref 3.2–4.9)
ALP SERPL-CCNC: 68 U/L (ref 30–99)
ALT SERPL-CCNC: 30 U/L (ref 2–50)
AST SERPL-CCNC: 28 U/L (ref 12–45)
B-HCG SERPL-ACNC: <0.6 MIU/ML (ref 0–5)
BASOPHILS # BLD AUTO: 0.8 % (ref 0–1.8)
BASOPHILS # BLD: 0.08 K/UL (ref 0–0.12)
BILIRUB CONJ SERPL-MCNC: <0.1 MG/DL (ref 0.1–0.5)
BILIRUB INDIRECT SERPL-MCNC: NORMAL MG/DL (ref 0–1)
BILIRUB SERPL-MCNC: 0.3 MG/DL (ref 0.1–1.5)
CHOLEST SERPL-MCNC: 198 MG/DL (ref 100–199)
EOSINOPHIL # BLD AUTO: 0.52 K/UL (ref 0–0.51)
EOSINOPHIL NFR BLD: 5.1 % (ref 0–6.9)
ERYTHROCYTE [DISTWIDTH] IN BLOOD BY AUTOMATED COUNT: 41.4 FL (ref 35.9–50)
HCG UR QL: NEGATIVE
HCT VFR BLD AUTO: 41.5 % (ref 37–47)
HDLC SERPL-MCNC: 42 MG/DL
HGB BLD-MCNC: 13.4 G/DL (ref 12–16)
LDLC SERPL CALC-MCNC: 126 MG/DL
LYMPHOCYTES # BLD AUTO: 2.36 K/UL (ref 1–4.8)
LYMPHOCYTES NFR BLD: 23.1 % (ref 22–41)
MANUAL DIFF BLD: NORMAL
MCH RBC QN AUTO: 30.2 PG (ref 27–33)
MCHC RBC AUTO-ENTMCNC: 32.3 G/DL (ref 33.6–35)
MCV RBC AUTO: 93.5 FL (ref 81.4–97.8)
MONOCYTES # BLD AUTO: 0.96 K/UL (ref 0–0.85)
MONOCYTES NFR BLD AUTO: 9.4 % (ref 0–13.4)
MORPHOLOGY BLD-IMP: NORMAL
NEUTROPHILS # BLD AUTO: 6.28 K/UL (ref 2–7.15)
NEUTROPHILS NFR BLD: 59 % (ref 44–72)
NEUTS BAND NFR BLD MANUAL: 2.6 % (ref 0–10)
NRBC # BLD AUTO: 0 K/UL
NRBC BLD-RTO: 0 /100 WBC
PLATELET # BLD AUTO: 264 K/UL (ref 164–446)
PLATELET BLD QL SMEAR: NORMAL
PMV BLD AUTO: 10.9 FL (ref 9–12.9)
PROT SERPL-MCNC: 7.8 G/DL (ref 6–8.2)
RBC # BLD AUTO: 4.44 M/UL (ref 4.2–5.4)
RBC BLD AUTO: NORMAL
SP GR UR REFRACTOMETRY: 1.02
TRIGL SERPL-MCNC: 148 MG/DL (ref 0–149)
WBC # BLD AUTO: 10.2 K/UL (ref 4.8–10.8)

## 2018-03-27 PROCEDURE — 85007 BL SMEAR W/DIFF WBC COUNT: CPT

## 2018-03-27 PROCEDURE — 36415 COLL VENOUS BLD VENIPUNCTURE: CPT

## 2018-03-27 PROCEDURE — 84702 CHORIONIC GONADOTROPIN TEST: CPT

## 2018-03-27 PROCEDURE — 81025 URINE PREGNANCY TEST: CPT

## 2018-03-27 PROCEDURE — 80061 LIPID PANEL: CPT

## 2018-03-27 PROCEDURE — 80076 HEPATIC FUNCTION PANEL: CPT

## 2018-03-27 PROCEDURE — 85027 COMPLETE CBC AUTOMATED: CPT

## 2018-03-28 LAB — LDLC SERPL-MCNC: 143 MG/DL (ref 0–129)

## 2018-04-11 ENCOUNTER — OFFICE VISIT (OUTPATIENT)
Dept: MEDICAL GROUP | Facility: MEDICAL CENTER | Age: 23
End: 2018-04-11
Payer: COMMERCIAL

## 2018-04-11 VITALS
HEIGHT: 61 IN | BODY MASS INDEX: 24.17 KG/M2 | WEIGHT: 128 LBS | RESPIRATION RATE: 16 BRPM | OXYGEN SATURATION: 96 % | DIASTOLIC BLOOD PRESSURE: 80 MMHG | TEMPERATURE: 98.7 F | HEART RATE: 64 BPM | SYSTOLIC BLOOD PRESSURE: 106 MMHG

## 2018-04-11 DIAGNOSIS — J45.990 EXERCISE-INDUCED ASTHMA: ICD-10-CM

## 2018-04-11 DIAGNOSIS — H00.019 HORDEOLUM EXTERNUM, UNSPECIFIED LATERALITY: ICD-10-CM

## 2018-04-11 DIAGNOSIS — K64.0 GRADE I HEMORRHOIDS: ICD-10-CM

## 2018-04-11 PROCEDURE — 99214 OFFICE O/P EST MOD 30 MIN: CPT | Performed by: FAMILY MEDICINE

## 2018-04-11 RX ORDER — SULFACETAMIDE SODIUM 100 MG/ML
1 SOLUTION/ DROPS OPHTHALMIC 4 TIMES DAILY
Qty: 1 BOTTLE | Refills: 0 | Status: ON HOLD | OUTPATIENT
Start: 2018-04-11 | End: 2018-12-04

## 2018-04-11 RX ORDER — ALBUTEROL SULFATE 90 UG/1
2 AEROSOL, METERED RESPIRATORY (INHALATION) 4 TIMES DAILY
Qty: 1 INHALER | Refills: 3 | Status: ON HOLD | OUTPATIENT
Start: 2018-04-11 | End: 2018-12-04

## 2018-04-11 RX ORDER — HYDROCORTISONE ACETATE 25 MG/1
25 SUPPOSITORY RECTAL EVERY 12 HOURS
Qty: 28 SUPPOSITORY | Refills: 0 | Status: ON HOLD | OUTPATIENT
Start: 2018-04-11 | End: 2018-12-04

## 2018-04-11 ASSESSMENT — PATIENT HEALTH QUESTIONNAIRE - PHQ9: CLINICAL INTERPRETATION OF PHQ2 SCORE: 0

## 2018-04-11 ASSESSMENT — PAIN SCALES - GENERAL: PAINLEVEL: NO PAIN

## 2018-04-11 NOTE — PATIENT INSTRUCTIONS
Stye  A stye is a bump on your eyelid caused by a bacterial infection. A stye can form inside the eyelid (internal stye) or outside the eyelid (external stye). An internal stye may be caused by an infected oil-producing gland inside your eyelid. An external stye may be caused by an infection at the base of your eyelash (hair follicle).  Styes are very common. Anyone can get them at any age. They usually occur in just one eye, but you may have more than one in either eye.  What are the causes?  The infection is almost always caused by bacteria called Staphylococcus aureus. This is a common type of bacteria that lives on your skin.  What increases the risk?  You may be at higher risk for a stye if you have had one before. You may also be at higher risk if you have:  · Diabetes.  · Long-term illness.  · Long-term eye redness.  · A skin condition called seborrhea.  · High fat levels in your blood (lipids).  What are the signs or symptoms?  Eyelid pain is the most common symptom of a stye. Internal styes are more painful than external styes. Other signs and symptoms may include:  · Painful swelling of your eyelid.  · A scratchy feeling in your eye.  · Tearing and redness of your eye.  · Pus draining from the stye.  How is this diagnosed?  Your health care provider may be able to diagnose a stye just by examining your eye. The health care provider may also check to make sure:  · You do not have a fever or other signs of a more serious infection.  · The infection has not spread to other parts of your eye or areas around your eye.  How is this treated?  Most styes will clear up in a few days without treatment. In some cases, you may need to use antibiotic drops or ointment to prevent infection. Your health care provider may have to drain the stye surgically if your stye is:  · Large.  · Causing a lot of pain.  · Interfering with your vision.  This can be done using a thin blade or a needle.  Follow these instructions at  home:  · Take medicines only as directed by your health care provider.  · Apply a clean, warm compress to your eye for 10 minutes, 4 times a day.  · Do not wear contact lenses or eye makeup until your stye has healed.  · Do not try to pop or drain the stye.  Contact a health care provider if:  · You have chills or a fever.  · Your stye does not go away after several days.  · Your stye affects your vision.  · Your eyeball becomes swollen, red, or painful.  This information is not intended to replace advice given to you by your health care provider. Make sure you discuss any questions you have with your health care provider.  Document Released: 09/27/2006 Document Revised: 08/13/2017 Document Reviewed: 04/03/2015  [email or web address] Interactive Patient Education © 2017 [employer] Inc.  Docusate capsules  What is this medicine?  DOCUSATE (doc CUE sayt) is stool softener. It helps prevent constipation and straining or discomfort associated with hard or dry stools.  This medicine may be used for other purposes; ask your health care provider or pharmacist if you have questions.  COMMON BRAND NAME(S): Colace, Colace Clear, Correctol, D.O.S., DC, Doc-Q-Lace, DocuLace, Docusoft S, DOK, DOK Extra Strength, Dulcolax, Genasoft, Rio-Tin, Kaopectate Liqui-Gels, Plascencia Stool Softener, Stool Softener, Stool Softner DC, Sulfolax, Jordan-Q-Lax, Surfak, Uni-Ease  What should I tell my health care provider before I take this medicine?  They need to know if you have any of these conditions:  -nausea or vomiting  -severe constipation  -stomach pain  -sudden change in bowel habit lasting more than 2 weeks  -an unusual or allergic reaction to docusate, other medicines, foods, dyes, or preservatives  -pregnant or trying to get pregnant  -breast-feeding  How should I use this medicine?  Take this medicine by mouth with a glass of water. Follow the directions on the label. Take your doses at regular intervals. Do not take your medicine more often than  directed.  Talk to your pediatrician regarding the use of this medicine in children. While this medicine may be prescribed for children as young as 2 years for selected conditions, precautions do apply.  Overdosage: If you think you have taken too much of this medicine contact a poison control center or emergency room at once.  NOTE: This medicine is only for you. Do not share this medicine with others.  What if I miss a dose?  If you miss a dose, take it as soon as you can. If it is almost time for your next dose, take only that dose. Do not take double or extra doses.  What may interact with this medicine?  -mineral oil  This list may not describe all possible interactions. Give your health care provider a list of all the medicines, herbs, non-prescription drugs, or dietary supplements you use. Also tell them if you smoke, drink alcohol, or use illegal drugs. Some items may interact with your medicine.  What should I watch for while using this medicine?  Do not use for more than one week without advice from your doctor or health care professional. If your constipation returns, check with your doctor or health care professional.  Drink plenty of water while taking this medicine. Drinking water helps decrease constipation.  Stop using this medicine and contact your doctor or health care professional if you experience any rectal bleeding or do not have a bowel movement after use. These could be signs of a more serious condition.  What side effects may I notice from receiving this medicine?  Side effects that you should report to your doctor or health care professional as soon as possible:  -allergic reactions like skin rash, itching or hives, swelling of the face, lips, or tongue  Side effects that usually do not require medical attention (report to your doctor or health care professional if they continue or are bothersome):  -diarrhea  -stomach cramps  -throat irritation  This list may not describe all possible side  effects. Call your doctor for medical advice about side effects. You may report side effects to FDA at 3-539-FCQ-4074.  Where should I keep my medicine?  Keep out of the reach of children.  Store at room temperature between 15 and 30 degrees C (59 and 86 degrees F). Throw away any unused medicine after the expiration date.  NOTE: This sheet is a summary. It may not cover all possible information. If you have questions about this medicine, talk to your doctor, pharmacist, or health care provider.  © 2018 Elsevier/Gold Standard (2009-04-09 15:56:49)  Hydrocortisone suppositories  What is this medicine?  HYDROCORTISONE (anjel droe KOR ti sone) is a corticosteroid. It is used to decrease swelling, itching, and pain that is caused by minor skin irritations or by hemorrhoids.  This medicine may be used for other purposes; ask your health care provider or pharmacist if you have questions.  COMMON BRAND NAME(S): Anucort-HC, Anumed-HC, Anusol HC, Encort, GRx HiCort, Hemmorex-HC, Hemorrhoidal-HC, Hemril, Proctocort, Proctosert HC, Proctosol-HC, Rectacort HC, Rectasol-HC  What should I tell my health care provider before I take this medicine?  They need to know if you have any of these conditions:  -an unusual or allergic reaction to hydrocortisone, corticosteroids, other medicines, foods, dyes, or preservatives  -pregnant or trying to get pregnant  -breast-feeding  How should I use this medicine?  This medicine is for rectal use only. Do not take by mouth. Wash your hands before and after use. Take off the foil wrapping. Wet the tip of the suppository with cold tap water to make it easier to use. Lie on your side with your lower leg straightened out and your upper leg bent forward toward your stomach. Lift upper buttock to expose the rectal area. Apply gentle pressure to insert the suppository completely into the rectum, pointed end first. Hold buttocks together for a few seconds. Remain lying down for about 15 minutes to avoid  having the suppository come out. Do not use more often than directed.  Talk to your pediatrician regarding the use of this medicine in children. Special care may be needed.  Overdosage: If you think you have taken too much of this medicine contact a poison control center or emergency room at once.  NOTE: This medicine is only for you. Do not share this medicine with others.  What if I miss a dose?  If you miss a dose, use it as soon as you can. If it is almost time for your next dose, use only that dose. Do not use double or extra doses.  What may interact with this medicine?  Interactions are not expected. Do not use any other rectal products on the affected area without telling your doctor or health care professional.  This list may not describe all possible interactions. Give your health care provider a list of all the medicines, herbs, non-prescription drugs, or dietary supplements you use. Also tell them if you smoke, drink alcohol, or use illegal drugs. Some items may interact with your medicine.  What should I watch for while using this medicine?  Visit your doctor or health care professional for regular checks on your progress. Tell your doctor or health care professional if your symptoms do not improve after a few days of use. Do not use if there is blood in your stools.  If you get any type of infection while using this medicine, you may need to stop using this medicine until our infections clears up. Ask your doctor or health care professional for advice.  What side effects may I notice from receiving this medicine?  Side effects that you should report to your doctor or health care professional as soon as possible:  -bloody or black, tarry stools  -painful, red, pus filled blisters in hair follicles  -rectal pain, burning or bleeding after use of medicine  Side effects that usually do not require medical attention (report to your doctor or health care professional if they continue or are  bothersome):  -changes in skin color  -dry skin  -itching or irritation  This list may not describe all possible side effects. Call your doctor for medical advice about side effects. You may report side effects to FDA at 8-126-CBU-7131.  Where should I keep my medicine?  Keep out of the reach of children.  Store at room temperature between 20 and 25 degrees C (68 and 77 degrees F). Protect from heat and freezing. Throw away any unused medicine after the expiration date.  NOTE: This sheet is a summary. It may not cover all possible information. If you have questions about this medicine, talk to your doctor, pharmacist, or health care provider.  © 2018 Elsevier/Gold Standard (2009-05-01 16:07:24)    Hemorrhoids  Hemorrhoids are swollen veins in and around the rectum or anus. There are two types of hemorrhoids:  · Internal hemorrhoids. These occur in the veins that are just inside the rectum. They may poke through to the outside and become irritated and painful.  · External hemorrhoids. These occur in the veins that are outside of the anus and can be felt as a painful swelling or hard lump near the anus.  Most hemorrhoids do not cause serious problems, and they can be managed with home treatments such as diet and lifestyle changes. If home treatments do not help your symptoms, procedures can be done to shrink or remove the hemorrhoids.  What are the causes?  This condition is caused by increased pressure in the anal area. This pressure may result from various things, including:  · Constipation.  · Straining to have a bowel movement.  · Diarrhea.  · Pregnancy.  · Obesity.  · Sitting for long periods of time.  · Heavy lifting or other activity that causes you to strain.  · Anal sex.  What are the signs or symptoms?  Symptoms of this condition include:  · Pain.  · Anal itching or irritation.  · Rectal bleeding.  · Leakage of stool (feces).  · Anal swelling.  · One or more lumps around the anus.  How is this  diagnosed?  This condition can often be diagnosed through a visual exam. Other exams or tests may also be done, such as:  · Examination of the rectal area with a gloved hand (digital rectal exam).  · Examination of the anal canal using a small tube (anoscope).  · A blood test, if you have lost a significant amount of blood.  · A test to look inside the colon (sigmoidoscopy or colonoscopy).  How is this treated?  This condition can usually be treated at home. However, various procedures may be done if dietary changes, lifestyle changes, and other home treatments do not help your symptoms. These procedures can help make the hemorrhoids smaller or remove them completely. Some of these procedures involve surgery, and others do not. Common procedures include:  · Rubber band ligation. Rubber bands are placed at the base of the hemorrhoids to cut off the blood supply to them.  · Sclerotherapy. Medicine is injected into the hemorrhoids to shrink them.  · Infrared coagulation. A type of light energy is used to get rid of the hemorrhoids.  · Hemorrhoidectomy surgery. The hemorrhoids are surgically removed, and the veins that supply them are tied off.  · Stapled hemorrhoidopexy surgery. A circular stapling device is used to remove the hemorrhoids and use staples to cut off the blood supply to them.  Follow these instructions at home:  Eating and drinking  · Eat foods that have a lot of fiber in them, such as whole grains, beans, nuts, fruits, and vegetables. Ask your health care provider about taking products that have added fiber (fiber supplements).  · Drink enough fluid to keep your urine clear or pale yellow.  Managing pain and swelling  · Take warm sitz baths for 20 minutes, 3-4 times a day to ease pain and discomfort.  · If directed, apply ice to the affected area. Using ice packs between sitz baths may be helpful.  ¨ Put ice in a plastic bag.  ¨ Place a towel between your skin and the bag.  ¨ Leave the ice on for 20  minutes, 2-3 times a day.  General instructions  · Take over-the-counter and prescription medicines only as told by your health care provider.  · Use medicated creams or suppositories as told.  · Exercise regularly.  · Go to the bathroom when you have the urge to have a bowel movement. Do not wait.  · Avoid straining to have bowel movements.  · Keep the anal area dry and clean. Use wet toilet paper or moist towelettes after a bowel movement.  · Do not sit on the toilet for long periods of time. This increases blood pooling and pain.  Contact a health care provider if:  · You have increasing pain and swelling that are not controlled by treatment or medicine.  · You have uncontrolled bleeding.  · You have difficulty having a bowel movement, or you are unable to have a bowel movement.  · You have pain or inflammation outside the area of the hemorrhoids.  This information is not intended to replace advice given to you by your health care provider. Make sure you discuss any questions you have with your health care provider.  Document Released: 12/15/2001 Document Revised: 05/17/2017 Document Reviewed: 08/31/2016  [employer] Interactive Patient Education © 2017 [employer]  Exercise-Induced Bronchospasm  A bronchospasm is a condition that is commonly caused by exercise, in which the muscles around the bronchioles (airways to the lungs) tighten, causing the airway to constrict. Exercise-induced bronchospasms are usually associated with short periods of vigorous activity. Many people who experience an exercise-induced bronchospasm may not notice at the time of the event; however, the athlete may later experience symptoms that negatively affect training and performance.  SYMPTOMS   · High-pitched sounds with breathing (wheezing).  · Coughing.  · Increased work of breathing (dyspnea).  · Rapid breathing (hyperventilation).  · Chest pain.  · Symptoms occurring 4 to 6 hours after exercise is completed (late-phase  reaction).  CAUSES   It is not known why certain individuals experience bronchospasms. Respiratory specialists currently think that the cool or dry air breathed in may cause damage to the lining of the bronchioles, which elicits an inflammatory response. The inflammation causes the airways to narrow and symptoms then occur.  RISK INCREASES WITH:  · Viral infections.  · Exercise in cold air.  · Exercise in dry conditions.  · Poor physical fitness.  · High-intensity exercise.  · No warm-up before play.  · Frequent exposure to substances that produce allergic reactions (allergens).  PREVENTION   · Improve conditioning.  · Treat allergies.  · Breathe warm air (cover mouth and nose with a towel or scarf).  · Warm up for an appropriate period of time before physical activity.  · Gradually decrease intensity (warm down) for an appropriate period of time after physical activity.  PROGNOSIS   Most people with exercise-induced asthma respond well to medication. Patients are typically prescribed an inhaler to treat bronchospasms. However, if symptoms persist despite treatment and continue to affect performance, individuals may need to consider avoiding activities that produce symptoms.  RELATED COMPLICATIONS   · Decreased athletic performance.  · Inability to condition as well as expected.  · Side effects from medications.  TREATMENT   · Maintain physical fitness.  · Run with a scarf or towel over your mouth in cold, dry air.  · Complete at least 10 minutes of warm-up before high-intensity exercise.  · Warm down after play.  · Treat allergies.  MEDICATION  · The usual initial medication is an albuterol inhaler, which expands the constricted bronchioles.  · The second-line medication is inhaled corticosteroids, which reduce inflammation in the airway.  · Alternative medications included sodium cromoglycate and nedocromil inhalers.  · Long-acting medications such as salmeterol can also be used as second-line  medications.  ACTIVITY   If medications are able to treat the offending symptoms, then no activity modification is required. If you know you will be training or competing in cold or dry climates take extra precautions to prevent symptoms.  DIET   No specific diet is recommended.   SEEK MEDICAL CARE IF:   · Greater than normal fatigue with exercise.  · Greater than normal difficulty breathing occurs with exercise.  · Increased wheezing with exercise.  · You appear to be breathing harder and faster than expected with training.  · Allergies appear to be uncontrollable.  · You experience chest pain with exercise.  This information is not intended to replace advice given to you by your health care provider. Make sure you discuss any questions you have with your health care provider.  Document Released: 12/18/2006 Document Revised: 01/08/2016 Document Reviewed: 08/24/2016  [employer] Interactive Patient Education © 2017 [employer]vier Inc.

## 2018-04-14 NOTE — ASSESSMENT & PLAN NOTE
Patient recently developed hemorrhoids after an episode of constipation.  She continues to have pain with BM's with occasional bright red blood.

## 2018-04-14 NOTE — PROGRESS NOTES
Subjective:     Chief Complaint   Patient presents with   • Asthma     exercise induced asthma    • Stye     1 week       Marilin Avila Alford is a 22 y.o. female here today for evaluation and management of:    Exercise-induced asthma  Patient complains of SOB when she runs outside.  She had exercise induced asthma when she was younger but hasn't had problems in years.  She has been fine when on the treadmill at the gym but started having problems when she began training for a half marathon and was running outside,  She has had some allergy symptoms.  She denies any persistent cough or chest pain.    Grade I hemorrhoids  Patient recently developed hemorrhoids after an episode of constipation.  She continues to have pain with BM's with occasional bright red blood.       Allergies   Allergen Reactions   • Tetracyclines Rash   • Other Drug Anaphylaxis     Steroids       Current medicines (including changes today)  Current Outpatient Prescriptions   Medication Sig Dispense Refill   • albuterol 108 (90 Base) MCG/ACT Aero Soln inhalation aerosol Inhale 2 Puffs by mouth 4 times a day. 1 Inhaler 3   • hydrocortisone (ANUSOL-HC) 25 MG Suppos Insert 1 Suppository in rectum every 12 hours. 28 Suppository 0   • sulfacetamide (SULAMYD) 10 % Solution Place 1 Drop in both eyes 4 times a day. 1 Bottle 0   • EPINEPHrine (EPIPEN) 0.3 MG/0.3ML Solution Auto-injector solution for injection 0.3 mL by Intramuscular route Once PRN (for severe shortness of breath or allergic reaction otherwise) for up to 1 dose. 1 Each 0   • isotretinoin (ACCUTANE) 40 MG capsule Take 1 Cap by mouth 2 times a day. 60 Cap 0   • fexofenadine (ALLEGRA) 60 MG TABS Take 60 mg by mouth every day.     • hydrOXYzine HCl (ATARAX) 25 MG Tab Take 1 Tab by mouth 3 times a day as needed for Itching. 30 Tab 0   • albuterol 108 (90 Base) MCG/ACT Aero Soln inhalation aerosol Inhale 2 Puffs by mouth every 6 hours as needed for Shortness of Breath (wheezing). 1 Inhaler  "0   • minocycline (MINOCIN) 100 MG Cap Take 1 caps BID x 10 days, then QD for 1 month 60 Cap 0     No current facility-administered medications for this visit.        She  has a past medical history of Acne; Asthma; and Snoring.    Patient Active Problem List    Diagnosis Date Noted   • Grade I hemorrhoids 04/11/2018   • Stye 04/11/2018   • Health care maintenance 10/19/2017   • Deviated nasal septum 08/03/2015   • Chronic maxillary sinusitis 08/03/2015   • Chronic ethmoidal sinusitis 08/03/2015   • Hypertrophy of nasal turbinates 08/03/2015   • Cyst of bone, localized 08/03/2015   • Exercise-induced asthma 02/27/2014       ROS   No fever or chills.  No nausea or vomiting.  No chest pain or palpitations.  No cough or SOB.  No pain with urination or hematuria.  No black or bloody stools.       Objective:     Blood pressure 106/80, pulse 64, temperature 37.1 °C (98.7 °F), resp. rate 16, height 1.549 m (5' 1\"), weight 58.1 kg (128 lb), last menstrual period 03/29/2018, SpO2 96 %, not currently breastfeeding. Body mass index is 24.19 kg/m².   Physical Exam:  Well developed, well nourished.  Alert, oriented in no acute distress.  Eye contact is good, speech goal directed, affect calm  Eyes: conjunctiva non-injected, sclera non-icteric.  Styes of bilateral upper lids  Ears: Pinna normal. TM pearly gray.   Nose: Nares are patent.  Normal mucosa  Mouth: Oral mucous membranes pink and moist with no lesions.  Neck Supple.  No adenopathy or masses in the neck or supraclavicular regions. No thyromegaly  Lungs: clear to auscultation bilaterally with good excursion. No wheezes or rhonchi  CV: regular rate and rhythm. No murmur  Rectal small internal hemorrhoid.  No active bleeding      Assessment and Plan:   The following treatment plan was discussed    1. Exercise-induced asthma  Refill Albuterol to use 30 minutes prior to outdoor exercise.  Recommend Claritin or Allegra before lengthy runs  - albuterol 108 (90 Base) MCG/ACT " Aero Soln inhalation aerosol; Inhale 2 Puffs by mouth 4 times a day.  Dispense: 1 Inhaler; Refill: 3    2. Grade I hemorrhoids  Use Colace.  Patient education materials given  - hydrocortisone (ANUSOL-HC) 25 MG Suppos; Insert 1 Suppository in rectum every 12 hours.  Dispense: 28 Suppository; Refill: 0    3. Hordeolum externum, unspecified laterality  Sulamyd as directed.  Warm packs 4 times daily  - sulfacetamide (SULAMYD) 10 % Solution; Place 1 Drop in both eyes 4 times a day.  Dispense: 1 Bottle; Refill: 0    Any change or worsening of signs or symptoms, patient encouraged to follow-up or report to the emergency room for further evaluation. Patient understands and agrees.    Followup: Return if symptoms worsen or fail to improve.

## 2018-04-14 NOTE — ASSESSMENT & PLAN NOTE
Patient complains of SOB when she runs outside.  She had exercise induced asthma when she was younger but hasn't had problems in years.  She has been fine when on the treadmill at the gym but started having problems when she began training for a half marathon and was running outside,  She has had some allergy symptoms.  She denies any persistent cough or chest pain.

## 2018-04-23 RX ORDER — DROSPIRENONE, ETHINYL ESTRADIOL AND LEVOMEFOLATE CALCIUM AND LEVOMEFOLATE CALCIUM 3-0.02(24)
KIT ORAL
Refills: 0 | Status: ON HOLD | COMMUNITY
Start: 2018-03-21 | End: 2022-01-30

## 2018-04-23 RX ORDER — ISOTRETINOIN 30 MG/1
CAPSULE, LIQUID FILLED ORAL
Refills: 0 | Status: ON HOLD | COMMUNITY
Start: 2018-02-02 | End: 2018-12-04

## 2018-04-23 RX ORDER — TRIAMCINOLONE ACETONIDE 1 MG/G
CREAM TOPICAL
Refills: 1 | Status: ON HOLD | COMMUNITY
Start: 2018-03-29 | End: 2018-12-04

## 2018-04-24 ENCOUNTER — OFFICE VISIT (OUTPATIENT)
Dept: MEDICAL GROUP | Facility: MEDICAL CENTER | Age: 23
End: 2018-04-24
Payer: COMMERCIAL

## 2018-04-24 VITALS
BODY MASS INDEX: 24.18 KG/M2 | WEIGHT: 128.09 LBS | RESPIRATION RATE: 16 BRPM | DIASTOLIC BLOOD PRESSURE: 72 MMHG | HEART RATE: 58 BPM | OXYGEN SATURATION: 95 % | HEIGHT: 61 IN | SYSTOLIC BLOOD PRESSURE: 118 MMHG | TEMPERATURE: 98.7 F

## 2018-04-24 DIAGNOSIS — J34.2 NASAL SEPTAL DEVIATION: ICD-10-CM

## 2018-04-24 DIAGNOSIS — J32.9 RECURRENT SINUS INFECTIONS: ICD-10-CM

## 2018-04-24 DIAGNOSIS — J34.3 HYPERTROPHY OF NASAL TURBINATES: ICD-10-CM

## 2018-04-24 DIAGNOSIS — Z00.00 HEALTH CARE MAINTENANCE: ICD-10-CM

## 2018-04-24 DIAGNOSIS — Z98.890 S/P CORRECTION OF DEVIATED NASAL SEPTUM: ICD-10-CM

## 2018-04-24 PROCEDURE — 99214 OFFICE O/P EST MOD 30 MIN: CPT | Performed by: INTERNAL MEDICINE

## 2018-04-24 NOTE — PROGRESS NOTES
CHIEF COMPLAINT  Chief Complaint   Patient presents with   • Referral Needed     Dr. Lockwood ENT- 2nd opinion     HPI  Patient is a 22 y.o. female patient who presents today for the following     Nasal septal deviation with history of correction, hypertrophy of nasal turbinates, recurrent sinus infections  The patient is a 22-year-old, female, with history of recurrent sinus infections since the age of 11 yrs;   Status post nasal radiation correction in 2015.   Continues to have nasal congestion, with nasal speech and difficulty breathing through.   Denies current:  - Nasal discharge  - Pain in sinus areas  - Fever, chills    She requested referral for second opinion - ENT.    Reviewed PMH, PSH, FH, SH, ALL, HCM/IMM, no changes  Reviewed MEDS, no changes    Patient Active Problem List    Diagnosis Date Noted   • Grade I hemorrhoids 04/11/2018   • Stye 04/11/2018   • Health care maintenance 10/19/2017   • Deviated nasal septum 08/03/2015   • Chronic maxillary sinusitis 08/03/2015   • Chronic ethmoidal sinusitis 08/03/2015   • Hypertrophy of nasal turbinates 08/03/2015   • Cyst of bone, localized 08/03/2015   • Exercise-induced asthma 02/27/2014     CURRENT MEDICATIONS  Current Outpatient Prescriptions   Medication Sig Dispense Refill   • Drospiren-Eth Estrad-Levomefol 3-0.02-0.451 MG Tab TAKE 1 TABLET BY MOUTH DAILY  0   • CLARAVIS 30 MG Cap TAKE 1 CAPSULE BY MOUTH TWICE DAILY [id number]  0   • triamcinolone acetonide (KENALOG) 0.1 % Cream APPLY TWICE DAILY TO DRY ITCHY RASH. MAY USE UP TO 2 WEEKS, AS NEEDED.  1   • albuterol 108 (90 Base) MCG/ACT Aero Soln inhalation aerosol Inhale 2 Puffs by mouth 4 times a day. 1 Inhaler 3   • hydrocortisone (ANUSOL-HC) 25 MG Suppos Insert 1 Suppository in rectum every 12 hours. 28 Suppository 0   • sulfacetamide (SULAMYD) 10 % Solution Place 1 Drop in both eyes 4 times a day. 1 Bottle 0   • hydrOXYzine HCl (ATARAX) 25 MG Tab Take 1 Tab by mouth 3 times a day as  needed for Itching. 30 Tab 0   • EPINEPHrine (EPIPEN) 0.3 MG/0.3ML Solution Auto-injector solution for injection 0.3 mL by Intramuscular route Once PRN (for severe shortness of breath or allergic reaction otherwise) for up to 1 dose. 1 Each 0   • albuterol 108 (90 Base) MCG/ACT Aero Soln inhalation aerosol Inhale 2 Puffs by mouth every 6 hours as needed for Shortness of Breath (wheezing). 1 Inhaler 0   • minocycline (MINOCIN) 100 MG Cap Take 1 caps BID x 10 days, then QD for 1 month 60 Cap 0   • isotretinoin (ACCUTANE) 40 MG capsule Take 1 Cap by mouth 2 times a day. 60 Cap 0   • fexofenadine (ALLEGRA) 60 MG TABS Take 60 mg by mouth every day.       No current facility-administered medications for this visit.      ALLERGIES  Allergies: Tetracyclines and Other drug  PAST MEDICAL HISTORY  Past Medical History:   Diagnosis Date   • Acne    • Asthma     inhaler as needed   • Snoring     no sleep study     SURGICAL HISTORY  She  has a past surgical history that includes other (5/25/2012); septoplasty (Bilateral, 8/3/2015); turbinoplasty (Bilateral, 8/3/2015); ethmoidectomy (Bilateral, 8/3/2015); and antrostomy (Bilateral, 8/3/2015).  SOCIAL HISTORY  Social History   Substance Use Topics   • Smoking status: Never Smoker   • Smokeless tobacco: Never Used   • Alcohol use Not on file     Social History     Social History Narrative   • No narrative on file     FAMILY HISTORY  Family History   Problem Relation Age of Onset   • No Known Problems Mother    • No Known Problems Father      Family Status   Relation Status   • Mother Alive   • Father Alive     ROS   Constitutional: Negative for fever, chills.  HENT: as above.  Eyes: Negative for blurred vision.   Respiratory: Negative for cough, shortness of breath.  Cardiovascular: Negative for chest pain, palpitations.   Gastrointestinal: Negative for heartburn, nausea, abdominal pain.   Musculoskeletal: Negative for significant myalgias, back pain and joint pain.   Skin: Negative  "for rash and itching.   Neuro: Negative for dizziness, weakness and headaches.   Endo/Heme/Allergies: Does not bruise/bleed easily.   Psychiatric/Behavioral: Negative for depression, anxiety    PHYSICAL EXAM   Blood pressure 118/72, pulse (!) 58, temperature 37.1 °C (98.7 °F), resp. rate 16, height 1.549 m (5' 1\"), weight 58.1 kg (128 lb 1.4 oz), last menstrual period 03/29/2018, SpO2 95 %. Body mass index is 24.2 kg/m².  General:  NAD, well appearing, with nasal speech.  HEENT:   NC/AT, PERRLA, EOMI, TMs are clear. Oropharyngeal mucosa is pink,  without lesions;  no cervical / supraclavicular  lymphadenopathy, no thyromegaly.    Cardiovascular: RRR.   No m/r/g. No carotid bruits .      Lungs:   CTAB, no w/r/r, no respiratory distress.  Abdomen: Soft, NT/ND + BS; no suprapubic tenderness; no masses or hepatosplenomegaly.  Extremities:  2+ DP and radial pulses bilaterally.  No c/c/e.   Skin:  Warm, dry.  No erythema. No rash.   Neurologic: Alert & oriented x 3. Strength and sensation grossly intact.  No focal deficits.  Psychiatric:  Affect normal, mood normal, judgment normal.    LABS     Labs are reviewed and discussed with a patient  Lab Results   Component Value Date/Time    CHOLSTRLTOT 198 03/27/2018 07:32 AM     (H) 03/27/2018 07:32 AM    HDL 42 03/27/2018 07:32 AM    TRIGLYCERIDE 148 03/27/2018 07:32 AM       Lab Results   Component Value Date/Time    SODIUM 141 10/21/2017 08:49 AM    POTASSIUM 4.0 10/21/2017 08:49 AM    CHLORIDE 106 10/21/2017 08:49 AM    CO2 26 10/21/2017 08:49 AM    GLUCOSE 76 10/21/2017 08:49 AM    BUN 18 10/21/2017 08:49 AM    CREATININE 0.90 10/21/2017 08:49 AM     Lab Results   Component Value Date/Time    ALKPHOSPHAT 68 03/27/2018 07:32 AM    ASTSGOT 28 03/27/2018 07:32 AM    ALTSGPT 30 03/27/2018 07:32 AM    TBILIRUBIN 0.3 03/27/2018 07:32 AM      Lab Results   Component Value Date/Time    WBC 10.2 03/27/2018 07:32 AM    RBC 4.44 03/27/2018 07:32 AM    HEMOGLOBIN 13.4 " 03/27/2018 07:32 AM    HEMATOCRIT 41.5 03/27/2018 07:32 AM    MCV 93.5 03/27/2018 07:32 AM    MCH 30.2 03/27/2018 07:32 AM    MCHC 32.3 (L) 03/27/2018 07:32 AM    MPV 10.9 03/27/2018 07:32 AM    NEUTSPOLYS 59.00 03/27/2018 07:32 AM    LYMPHOCYTES 23.10 03/27/2018 07:32 AM    MONOCYTES 9.40 03/27/2018 07:32 AM    EOSINOPHILS 5.10 03/27/2018 07:32 AM    BASOPHILS 0.80 03/27/2018 07:32 AM      IMAGING     None    ASSESMENT AND PLAN        1. Nasal septal deviation  - REFERRAL TO ENT  2. S/P correction of deviated nasal septum  - REFERRAL TO ENT  3. Hypertrophy of nasal turbinates  - REFERRAL TO ENT  4. Recurrent sinus infections    Advised to use nasal decongestant until seen by ENT;  - no signs of current sinus infection.    5. Health care maintenance  - REFERRAL TO ENT    Counseling:   - Smoking:  Nonsmoker    Followup: Return if symptoms worsen or fail to improve.    All questions are answered.    Please note that this dictation was created using voice recognition software, and that there might be errors of ciro and possibly content.

## 2018-04-26 ENCOUNTER — HOSPITAL ENCOUNTER (OUTPATIENT)
Dept: LAB | Facility: MEDICAL CENTER | Age: 23
End: 2018-04-26
Attending: PHYSICIAN ASSISTANT
Payer: COMMERCIAL

## 2018-04-26 LAB
ALBUMIN SERPL BCP-MCNC: 4.4 G/DL (ref 3.2–4.9)
ALP SERPL-CCNC: 45 U/L (ref 30–99)
ALT SERPL-CCNC: 53 U/L (ref 2–50)
AST SERPL-CCNC: 37 U/L (ref 12–45)
BASOPHILS # BLD AUTO: 0.6 % (ref 0–1.8)
BASOPHILS # BLD: 0.05 K/UL (ref 0–0.12)
BILIRUB CONJ SERPL-MCNC: <0.1 MG/DL (ref 0.1–0.5)
BILIRUB INDIRECT SERPL-MCNC: ABNORMAL MG/DL (ref 0–1)
BILIRUB SERPL-MCNC: 0.5 MG/DL (ref 0.1–1.5)
CHOLEST SERPL-MCNC: 245 MG/DL (ref 100–199)
EOSINOPHIL # BLD AUTO: 0.71 K/UL (ref 0–0.51)
EOSINOPHIL NFR BLD: 8.9 % (ref 0–6.9)
ERYTHROCYTE [DISTWIDTH] IN BLOOD BY AUTOMATED COUNT: 41.1 FL (ref 35.9–50)
HCG SERPL QL: NEGATIVE
HCG UR QL: NEGATIVE
HCT VFR BLD AUTO: 40.8 % (ref 37–47)
HDLC SERPL-MCNC: 53 MG/DL
HGB BLD-MCNC: 13.6 G/DL (ref 12–16)
IMM GRANULOCYTES # BLD AUTO: 0.01 K/UL (ref 0–0.11)
IMM GRANULOCYTES NFR BLD AUTO: 0.1 % (ref 0–0.9)
LDLC SERPL CALC-MCNC: 170 MG/DL
LYMPHOCYTES # BLD AUTO: 2.28 K/UL (ref 1–4.8)
LYMPHOCYTES NFR BLD: 28.4 % (ref 22–41)
MCH RBC QN AUTO: 30.6 PG (ref 27–33)
MCHC RBC AUTO-ENTMCNC: 33.3 G/DL (ref 33.6–35)
MCV RBC AUTO: 91.9 FL (ref 81.4–97.8)
MONOCYTES # BLD AUTO: 0.51 K/UL (ref 0–0.85)
MONOCYTES NFR BLD AUTO: 6.4 % (ref 0–13.4)
NEUTROPHILS # BLD AUTO: 4.46 K/UL (ref 2–7.15)
NEUTROPHILS NFR BLD: 55.6 % (ref 44–72)
NRBC # BLD AUTO: 0 K/UL
NRBC BLD-RTO: 0 /100 WBC
PLATELET # BLD AUTO: 228 K/UL (ref 164–446)
PMV BLD AUTO: 11.2 FL (ref 9–12.9)
PROT SERPL-MCNC: 7.8 G/DL (ref 6–8.2)
RBC # BLD AUTO: 4.44 M/UL (ref 4.2–5.4)
SP GR UR REFRACTOMETRY: 1.03
TRIGL SERPL-MCNC: 109 MG/DL (ref 0–149)
WBC # BLD AUTO: 8 K/UL (ref 4.8–10.8)

## 2018-04-26 PROCEDURE — 84703 CHORIONIC GONADOTROPIN ASSAY: CPT

## 2018-04-26 PROCEDURE — 80061 LIPID PANEL: CPT

## 2018-04-26 PROCEDURE — 85025 COMPLETE CBC W/AUTO DIFF WBC: CPT

## 2018-04-26 PROCEDURE — 36415 COLL VENOUS BLD VENIPUNCTURE: CPT

## 2018-04-26 PROCEDURE — 80076 HEPATIC FUNCTION PANEL: CPT

## 2018-04-26 PROCEDURE — 81025 URINE PREGNANCY TEST: CPT

## 2018-04-28 LAB — LDLC SERPL-MCNC: 189 MG/DL (ref 0–129)

## 2018-05-10 ENCOUNTER — TELEPHONE (OUTPATIENT)
Dept: MEDICAL GROUP | Facility: MEDICAL CENTER | Age: 23
End: 2018-05-10

## 2018-05-10 DIAGNOSIS — B00.1 COLD SORE: ICD-10-CM

## 2018-05-10 RX ORDER — ACYCLOVIR 400 MG/1
400 TABLET ORAL 2 TIMES DAILY
Qty: 20 TAB | Refills: 0 | Status: ON HOLD | OUTPATIENT
Start: 2018-05-10 | End: 2018-12-04

## 2018-05-10 NOTE — TELEPHONE ENCOUNTER
Phone Number Called: [phone] (home)     Message: Patient was in last week and forgot to mention that she had a cold sore and needs a prescription for acyclovir. That was the reason for her appointment but completely forgot to mention.  Said that you did discuss acne and stress on her body. Please advice.    Left Message for patient to call back: N\A

## 2018-05-31 ENCOUNTER — HOSPITAL ENCOUNTER (OUTPATIENT)
Dept: LAB | Facility: MEDICAL CENTER | Age: 23
End: 2018-05-31
Attending: PHYSICIAN ASSISTANT
Payer: COMMERCIAL

## 2018-05-31 LAB
ALBUMIN SERPL BCP-MCNC: 4.8 G/DL (ref 3.2–4.9)
ALP SERPL-CCNC: 49 U/L (ref 30–99)
ALT SERPL-CCNC: 55 U/L (ref 2–50)
AST SERPL-CCNC: 37 U/L (ref 12–45)
B-HCG SERPL-ACNC: <0.6 MIU/ML (ref 0–5)
BASOPHILS # BLD AUTO: 0.7 % (ref 0–1.8)
BASOPHILS # BLD: 0.07 K/UL (ref 0–0.12)
BILIRUB CONJ SERPL-MCNC: <0.1 MG/DL (ref 0.1–0.5)
BILIRUB INDIRECT SERPL-MCNC: ABNORMAL MG/DL (ref 0–1)
BILIRUB SERPL-MCNC: 0.6 MG/DL (ref 0.1–1.5)
CHOLEST SERPL-MCNC: 237 MG/DL (ref 100–199)
EOSINOPHIL # BLD AUTO: 0.25 K/UL (ref 0–0.51)
EOSINOPHIL NFR BLD: 2.4 % (ref 0–6.9)
ERYTHROCYTE [DISTWIDTH] IN BLOOD BY AUTOMATED COUNT: 44.9 FL (ref 35.9–50)
HCG UR QL: NEGATIVE
HCT VFR BLD AUTO: 41.2 % (ref 37–47)
HDLC SERPL-MCNC: 53 MG/DL
HGB BLD-MCNC: 13.5 G/DL (ref 12–16)
IMM GRANULOCYTES # BLD AUTO: 0.04 K/UL (ref 0–0.11)
IMM GRANULOCYTES NFR BLD AUTO: 0.4 % (ref 0–0.9)
LDLC SERPL CALC-MCNC: 168 MG/DL
LYMPHOCYTES # BLD AUTO: 2.52 K/UL (ref 1–4.8)
LYMPHOCYTES NFR BLD: 24.6 % (ref 22–41)
MCH RBC QN AUTO: 30.8 PG (ref 27–33)
MCHC RBC AUTO-ENTMCNC: 32.8 G/DL (ref 33.6–35)
MCV RBC AUTO: 93.8 FL (ref 81.4–97.8)
MONOCYTES # BLD AUTO: 0.64 K/UL (ref 0–0.85)
MONOCYTES NFR BLD AUTO: 6.3 % (ref 0–13.4)
NEUTROPHILS # BLD AUTO: 6.72 K/UL (ref 2–7.15)
NEUTROPHILS NFR BLD: 65.6 % (ref 44–72)
NRBC # BLD AUTO: 0 K/UL
NRBC BLD-RTO: 0 /100 WBC
PLATELET # BLD AUTO: 246 K/UL (ref 164–446)
PMV BLD AUTO: 10.7 FL (ref 9–12.9)
PROT SERPL-MCNC: 8.2 G/DL (ref 6–8.2)
RBC # BLD AUTO: 4.39 M/UL (ref 4.2–5.4)
SP GR UR REFRACTOMETRY: 1.02
TRIGL SERPL-MCNC: 82 MG/DL (ref 0–149)
WBC # BLD AUTO: 10.2 K/UL (ref 4.8–10.8)

## 2018-05-31 PROCEDURE — 85025 COMPLETE CBC W/AUTO DIFF WBC: CPT

## 2018-05-31 PROCEDURE — 84702 CHORIONIC GONADOTROPIN TEST: CPT

## 2018-05-31 PROCEDURE — 36415 COLL VENOUS BLD VENIPUNCTURE: CPT

## 2018-05-31 PROCEDURE — 80061 LIPID PANEL: CPT

## 2018-05-31 PROCEDURE — 80076 HEPATIC FUNCTION PANEL: CPT

## 2018-05-31 PROCEDURE — 81025 URINE PREGNANCY TEST: CPT

## 2018-06-01 LAB — LDLC SERPL-MCNC: 166 MG/DL (ref 0–129)

## 2018-07-02 ENCOUNTER — HOSPITAL ENCOUNTER (OUTPATIENT)
Dept: LAB | Facility: MEDICAL CENTER | Age: 23
End: 2018-07-02
Attending: PHYSICIAN ASSISTANT
Payer: COMMERCIAL

## 2018-07-02 LAB
ALBUMIN SERPL BCP-MCNC: 4.1 G/DL (ref 3.2–4.9)
ALP SERPL-CCNC: 45 U/L (ref 30–99)
ALT SERPL-CCNC: 34 U/L (ref 2–50)
AST SERPL-CCNC: 23 U/L (ref 12–45)
B-HCG SERPL-ACNC: <0.6 MIU/ML (ref 0–5)
BASOPHILS # BLD AUTO: 0.8 % (ref 0–1.8)
BASOPHILS # BLD: 0.07 K/UL (ref 0–0.12)
BILIRUB CONJ SERPL-MCNC: <0.1 MG/DL (ref 0.1–0.5)
BILIRUB INDIRECT SERPL-MCNC: NORMAL MG/DL (ref 0–1)
BILIRUB SERPL-MCNC: 0.4 MG/DL (ref 0.1–1.5)
CHOLEST SERPL-MCNC: 230 MG/DL (ref 100–199)
EOSINOPHIL # BLD AUTO: 0.4 K/UL (ref 0–0.51)
EOSINOPHIL NFR BLD: 4.3 % (ref 0–6.9)
ERYTHROCYTE [DISTWIDTH] IN BLOOD BY AUTOMATED COUNT: 46.2 FL (ref 35.9–50)
HCG UR QL: NEGATIVE
HCT VFR BLD AUTO: 42.5 % (ref 37–47)
HDLC SERPL-MCNC: 44 MG/DL
HGB BLD-MCNC: 13.5 G/DL (ref 12–16)
IMM GRANULOCYTES # BLD AUTO: 0.03 K/UL (ref 0–0.11)
IMM GRANULOCYTES NFR BLD AUTO: 0.3 % (ref 0–0.9)
LDLC SERPL CALC-MCNC: 142 MG/DL
LYMPHOCYTES # BLD AUTO: 3.23 K/UL (ref 1–4.8)
LYMPHOCYTES NFR BLD: 35.1 % (ref 22–41)
MCH RBC QN AUTO: 30.7 PG (ref 27–33)
MCHC RBC AUTO-ENTMCNC: 31.8 G/DL (ref 33.6–35)
MCV RBC AUTO: 96.6 FL (ref 81.4–97.8)
MONOCYTES # BLD AUTO: 0.84 K/UL (ref 0–0.85)
MONOCYTES NFR BLD AUTO: 9.1 % (ref 0–13.4)
NEUTROPHILS # BLD AUTO: 4.64 K/UL (ref 2–7.15)
NEUTROPHILS NFR BLD: 50.4 % (ref 44–72)
NRBC # BLD AUTO: 0 K/UL
NRBC BLD-RTO: 0 /100 WBC
PLATELET # BLD AUTO: 202 K/UL (ref 164–446)
PMV BLD AUTO: 12.1 FL (ref 9–12.9)
PROT SERPL-MCNC: 7.4 G/DL (ref 6–8.2)
RBC # BLD AUTO: 4.4 M/UL (ref 4.2–5.4)
SP GR UR REFRACTOMETRY: 1.03
TRIGL SERPL-MCNC: 221 MG/DL (ref 0–149)
WBC # BLD AUTO: 9.2 K/UL (ref 4.8–10.8)

## 2018-07-02 PROCEDURE — 85025 COMPLETE CBC W/AUTO DIFF WBC: CPT

## 2018-07-02 PROCEDURE — 80061 LIPID PANEL: CPT

## 2018-07-02 PROCEDURE — 81025 URINE PREGNANCY TEST: CPT

## 2018-07-02 PROCEDURE — 84702 CHORIONIC GONADOTROPIN TEST: CPT

## 2018-07-02 PROCEDURE — 36415 COLL VENOUS BLD VENIPUNCTURE: CPT

## 2018-07-02 PROCEDURE — 80076 HEPATIC FUNCTION PANEL: CPT

## 2018-07-03 LAB — LDLC SERPL-MCNC: 154 MG/DL (ref 0–129)

## 2018-08-30 ENCOUNTER — OFFICE VISIT (OUTPATIENT)
Dept: URGENT CARE | Facility: PHYSICIAN GROUP | Age: 23
End: 2018-08-30
Payer: COMMERCIAL

## 2018-08-30 VITALS
HEART RATE: 64 BPM | TEMPERATURE: 97.1 F | DIASTOLIC BLOOD PRESSURE: 78 MMHG | OXYGEN SATURATION: 97 % | WEIGHT: 128 LBS | RESPIRATION RATE: 14 BRPM | HEIGHT: 61 IN | SYSTOLIC BLOOD PRESSURE: 120 MMHG | BODY MASS INDEX: 24.17 KG/M2

## 2018-08-30 DIAGNOSIS — J01.01 ACUTE RECURRENT MAXILLARY SINUSITIS: ICD-10-CM

## 2018-08-30 PROCEDURE — 99214 OFFICE O/P EST MOD 30 MIN: CPT | Performed by: PHYSICIAN ASSISTANT

## 2018-08-30 RX ORDER — AMOXICILLIN AND CLAVULANATE POTASSIUM 875; 125 MG/1; MG/1
1 TABLET, FILM COATED ORAL 2 TIMES DAILY
Qty: 14 TAB | Refills: 0 | Status: SHIPPED | OUTPATIENT
Start: 2018-08-30 | End: 2018-09-06

## 2018-08-30 ASSESSMENT — ENCOUNTER SYMPTOMS
SHORTNESS OF BREATH: 0
FEVER: 0
EYE DISCHARGE: 0
EYE PAIN: 0
WHEEZING: 0
SINUS PRESSURE: 1
COUGH: 1
SINUS PAIN: 1
DIZZINESS: 1
PALPITATIONS: 0
SORE THROAT: 0
HEADACHES: 1
CHILLS: 0
EYE REDNESS: 0

## 2018-08-30 NOTE — PROGRESS NOTES
Subjective:      Marilin Sears [facility] is a 23 y.o. female who presents with Sinus Problem (sinus infection sinus pressure ear pain)            Sinus Problem   This is a new problem. The current episode started 1 to 4 weeks ago. The problem is unchanged. Associated symptoms include congestion, coughing, ear pain, headaches and sinus pressure. Pertinent negatives include no chills, shortness of breath or sore throat. Past treatments include oral decongestants. The treatment provided no relief.       Review of Systems   Constitutional: Positive for malaise/fatigue. Negative for chills and fever.   HENT: Positive for congestion, ear pain, sinus pain and sinus pressure. Negative for sore throat.    Eyes: Negative for pain, discharge and redness.   Respiratory: Positive for cough. Negative for shortness of breath and wheezing.    Cardiovascular: Negative for chest pain and palpitations.   Neurological: Positive for dizziness and headaches.   All other systems reviewed and are negative.    PMH:  has a past medical history of Acne; Asthma; and Snoring.  MEDS:   Current Outpatient Prescriptions:   •  amoxicillin-clavulanate (AUGMENTIN) 875-125 MG Tab, Take 1 Tab by mouth 2 times a day for 7 days., Disp: 14 Tab, Rfl: 0  •  Drospiren-Eth Estrad-Levomefol 3-0.02-0.451 MG Tab, TAKE 1 TABLET BY MOUTH DAILY, Disp: , Rfl: 0  •  fexofenadine (ALLEGRA) 60 MG TABS, Take 60 mg by mouth every day., Disp: , Rfl:   •  acyclovir (ZOVIRAX) 400 MG tablet, Take 1 Tab by mouth 2 times a day., Disp: 20 Tab, Rfl: 0  •  CLARAVIS 30 MG Cap, TAKE 1 CAPSULE BY MOUTH TWICE DAILY *[id number]*, Disp: , Rfl: 0  •  triamcinolone acetonide (KENALOG) 0.1 % Cream, APPLY TWICE DAILY TO DRY ITCHY RASH. MAY USE UP TO 2 WEEKS, AS NEEDED., Disp: , Rfl: 1  •  albuterol 108 (90 Base) MCG/ACT Aero Soln inhalation aerosol, Inhale 2 Puffs by mouth 4 times a day., Disp: 1 Inhaler, Rfl: 3  •  hydrocortisone (ANUSOL-HC) 25 MG Suppos, Insert 1  Suppository in rectum every 12 hours., Disp: 28 Suppository, Rfl: 0  •  sulfacetamide (SULAMYD) 10 % Solution, Place 1 Drop in both eyes 4 times a day., Disp: 1 Bottle, Rfl: 0  •  hydrOXYzine HCl (ATARAX) 25 MG Tab, Take 1 Tab by mouth 3 times a day as needed for Itching., Disp: 30 Tab, Rfl: 0  •  EPINEPHrine (EPIPEN) 0.3 MG/0.3ML Solution Auto-injector solution for injection, 0.3 mL by Intramuscular route Once PRN (for severe shortness of breath or allergic reaction otherwise) for up to 1 dose., Disp: 1 Each, Rfl: 0  •  albuterol 108 (90 Base) MCG/ACT Aero Soln inhalation aerosol, Inhale 2 Puffs by mouth every 6 hours as needed for Shortness of Breath (wheezing)., Disp: 1 Inhaler, Rfl: 0  •  minocycline (MINOCIN) 100 MG Cap, Take 1 caps BID x 10 days, then QD for 1 month, Disp: 60 Cap, Rfl: 0  •  isotretinoin (ACCUTANE) 40 MG capsule, Take 1 Cap by mouth 2 times a day., Disp: 60 Cap, Rfl: 0  ALLERGIES:   Allergies   Allergen Reactions   • Tetracyclines Rash   • Other Drug Anaphylaxis     Steroids     SURGHX:   Past Surgical History:   Procedure Laterality Date   • SEPTOPLASTY Bilateral 8/3/2015    Procedure: SEPTOPLASTY W/BILAT ENDOSCOPIC CARTER BULLOSA RESECTION;  Surgeon: ALEYDA Waldron M.D.;  Location: SURGERY SAME DAY [facility] ORS;  Service:    • TURBINOPLASTY Bilateral 8/3/2015    Procedure: TURBINOPLASTY;  Surgeon: ALEYDA Waldron M.D.;  Location: SURGERY SAME DAY [facility] ORS;  Service:    • ETHMOIDECTOMY Bilateral 8/3/2015    Procedure: ENDOSCOPIC TOTAL ETHMOIDECTOMY;  Surgeon: ALEYDA Waldron M.D.;  Location: SURGERY SAME DAY [facility] ORS;  Service:    • ANTROSTOMY Bilateral 8/3/2015    Procedure: ENDOSCOPIC MAXILLARY ANTROSTOMY WITH TISSUE REMOVAL;  Surgeon: ALEYDA Waldron M.D.;  Location: SURGERY SAME DAY [facility] ORS;  Service:    • OTHER  5/25/2012    removal of wisdom teeth     SOCHX:  reports that she has never smoked. She has never used smokeless tobacco.  FH: Family history was  "reviewed, no pertinent findings to report  Medications, Allergies, and current problem list reviewed today in Epic       Objective:     /78   Pulse 64   Temp 36.2 °C (97.1 °F)   Resp 14   Ht 1.549 m (5' 1\")   Wt 58.1 kg (128 lb)   SpO2 97%   BMI 24.19 kg/m²      Physical Exam   Constitutional: She is oriented to person, place, and time. She appears well-developed and well-nourished.  Non-toxic appearance. She does not have a sickly appearance. She does not appear ill. No distress.   HENT:   Head: Normocephalic and atraumatic.   Right Ear: Hearing, tympanic membrane, external ear and ear canal normal.   Left Ear: Hearing, tympanic membrane, external ear and ear canal normal.   Nose: Mucosal edema and rhinorrhea present. No sinus tenderness. No epistaxis. Right sinus exhibits maxillary sinus tenderness. Left sinus exhibits maxillary sinus tenderness.   Mouth/Throat: Uvula is midline, oropharynx is clear and moist and mucous membranes are normal.   Eyes: Conjunctivae and EOM are normal.   Neck: Normal range of motion. Neck supple.   Cardiovascular: Normal rate, regular rhythm, normal heart sounds and intact distal pulses.    Pulmonary/Chest: Effort normal and breath sounds normal.   Neurological: She is alert and oriented to person, place, and time.   Skin: Skin is warm and dry.   Psychiatric: She has a normal mood and affect. Her behavior is normal. Judgment and thought content normal.   Vitals reviewed.              Assessment/Plan:     1. Acute recurrent maxillary sinusitis    - amoxicillin-clavulanate (AUGMENTIN) 875-125 MG Tab; Take 1 Tab by mouth 2 times a day for 7 days.  Dispense: 14 Tab; Refill: 0    Differential diagnosis, natural history, supportive care discussed. Follow-up with primary care provider within 7-10 days, emergency room precautions discussed.  Patient and/or family appears understanding of information.  Handout and review of patients diagnosis and treatment was discussed " extensively.

## 2018-09-27 ENCOUNTER — HOSPITAL ENCOUNTER (OUTPATIENT)
Dept: LAB | Facility: MEDICAL CENTER | Age: 23
End: 2018-09-27
Payer: COMMERCIAL

## 2018-09-27 ENCOUNTER — DOCUMENTATION (OUTPATIENT)
Dept: OCCUPATIONAL MEDICINE | Facility: CLINIC | Age: 23
End: 2018-09-27

## 2018-09-27 LAB
BDY FAT % MEASURED: 23 %
BP DIAS: 70 MMHG
BP SYS: 114 MMHG
CHOLEST SERPL-MCNC: 217 MG/DL (ref 100–199)
DIABETES HTDIA: NO
EVENT NAME HTEVT: NORMAL
FASTING HTFAS: YES
GLUCOSE SERPL-MCNC: 108 MG/DL (ref 65–99)
HDLC SERPL-MCNC: 47 MG/DL
HYPERTENSION HTHYP: NO
LDLC SERPL CALC-MCNC: 148 MG/DL
SCREENING LOC CITY HTCIT: NORMAL
SCREENING LOC STATE HTSTA: NORMAL
SCREENING LOCATION HTLOC: NORMAL
SMOKING HTSMO: NO
SUBSCRIBER ID HTSID: NORMAL
TRIGL SERPL-MCNC: 111 MG/DL (ref 0–149)

## 2018-09-27 PROCEDURE — 36415 COLL VENOUS BLD VENIPUNCTURE: CPT

## 2018-09-27 PROCEDURE — S5190 WELLNESS ASSESSMENT BY NONPH: HCPCS

## 2018-09-27 PROCEDURE — 80061 LIPID PANEL: CPT

## 2018-09-27 PROCEDURE — 82947 ASSAY GLUCOSE BLOOD QUANT: CPT

## 2018-10-04 LAB
HAV IGM SERPL QL IA: NEGATIVE
HBV CORE IGM SER QL: NEGATIVE
HBV SURFACE AG SER QL: NEGATIVE
HCV AB SER QL: NEGATIVE

## 2018-10-15 ENCOUNTER — EH NON-PROVIDER (OUTPATIENT)
Dept: OCCUPATIONAL MEDICINE | Facility: CLINIC | Age: 23
End: 2018-10-15

## 2018-10-15 DIAGNOSIS — Z02.89 ENCOUNTER FOR OCCUPATIONAL HEALTH EXAMINATION INVOLVING RESPIRATOR: ICD-10-CM

## 2018-10-15 PROCEDURE — 94375 RESPIRATORY FLOW VOLUME LOOP: CPT | Performed by: PREVENTIVE MEDICINE

## 2018-10-15 PROCEDURE — 94010 BREATHING CAPACITY TEST: CPT | Performed by: PREVENTIVE MEDICINE

## 2018-10-17 ENCOUNTER — DOCUMENTATION (OUTPATIENT)
Dept: OCCUPATIONAL MEDICINE | Facility: CLINIC | Age: 23
End: 2018-10-17

## 2018-10-17 NOTE — PROGRESS NOTES
Seen for spirometry on 10/15/18.    Spirometry test are within normal limits for patient's stated age, height, and weight at the time of test. Patient is physically able to perform essential functions based on job title or work conditions defined at the time of evaluation without accommodations as it pertains to their respiratory status.     See scanned documents.

## 2018-12-04 ENCOUNTER — HOSPITAL ENCOUNTER (OUTPATIENT)
Facility: MEDICAL CENTER | Age: 23
End: 2018-12-04
Attending: OTOLARYNGOLOGY | Admitting: OTOLARYNGOLOGY
Payer: COMMERCIAL

## 2018-12-04 VITALS
DIASTOLIC BLOOD PRESSURE: 76 MMHG | SYSTOLIC BLOOD PRESSURE: 119 MMHG | HEART RATE: 74 BPM | WEIGHT: 127.87 LBS | HEIGHT: 62 IN | TEMPERATURE: 98.2 F | RESPIRATION RATE: 16 BRPM | BODY MASS INDEX: 23.53 KG/M2 | OXYGEN SATURATION: 95 %

## 2018-12-04 LAB
B-HCG FREE SERPL-ACNC: <5 MIU/ML
IHCGL IHCGL: NEGATIVE MIU/ML
PATHOLOGY CONSULT NOTE: NORMAL

## 2018-12-04 PROCEDURE — 160025 RECOVERY II MINUTES (STATS): Performed by: OTOLARYNGOLOGY

## 2018-12-04 PROCEDURE — 160002 HCHG RECOVERY MINUTES (STAT): Performed by: OTOLARYNGOLOGY

## 2018-12-04 PROCEDURE — A9270 NON-COVERED ITEM OR SERVICE: HCPCS | Performed by: ANESTHESIOLOGY

## 2018-12-04 PROCEDURE — 160048 HCHG OR STATISTICAL LEVEL 1-5: Performed by: OTOLARYNGOLOGY

## 2018-12-04 PROCEDURE — 500331 HCHG COTTONOID, SURG PATTIE: Performed by: OTOLARYNGOLOGY

## 2018-12-04 PROCEDURE — 88311 DECALCIFY TISSUE: CPT

## 2018-12-04 PROCEDURE — 700101 HCHG RX REV CODE 250

## 2018-12-04 PROCEDURE — 700102 HCHG RX REV CODE 250 W/ 637 OVERRIDE(OP)

## 2018-12-04 PROCEDURE — 700111 HCHG RX REV CODE 636 W/ 250 OVERRIDE (IP)

## 2018-12-04 PROCEDURE — 502573 HCHG PACK, ENT: Performed by: OTOLARYNGOLOGY

## 2018-12-04 PROCEDURE — 160036 HCHG PACU - EA ADDL 30 MINS PHASE I: Performed by: OTOLARYNGOLOGY

## 2018-12-04 PROCEDURE — 160029 HCHG SURGERY MINUTES - 1ST 30 MINS LEVEL 4: Performed by: OTOLARYNGOLOGY

## 2018-12-04 PROCEDURE — 88304 TISSUE EXAM BY PATHOLOGIST: CPT

## 2018-12-04 PROCEDURE — 501838 HCHG SUTURE GENERAL: Performed by: OTOLARYNGOLOGY

## 2018-12-04 PROCEDURE — 501404 HCHG SPLINT, NASAL DOYLE AIRWAY: Performed by: OTOLARYNGOLOGY

## 2018-12-04 PROCEDURE — 502692 HCHG DRESSING, NASOPORE 8CM: Performed by: OTOLARYNGOLOGY

## 2018-12-04 PROCEDURE — 160046 HCHG PACU - 1ST 60 MINS PHASE II: Performed by: OTOLARYNGOLOGY

## 2018-12-04 PROCEDURE — 700102 HCHG RX REV CODE 250 W/ 637 OVERRIDE(OP): Performed by: ANESTHESIOLOGY

## 2018-12-04 PROCEDURE — 84702 CHORIONIC GONADOTROPIN TEST: CPT

## 2018-12-04 PROCEDURE — A9270 NON-COVERED ITEM OR SERVICE: HCPCS

## 2018-12-04 PROCEDURE — 160041 HCHG SURGERY MINUTES - EA ADDL 1 MIN LEVEL 4: Performed by: OTOLARYNGOLOGY

## 2018-12-04 PROCEDURE — 160009 HCHG ANES TIME/MIN: Performed by: OTOLARYNGOLOGY

## 2018-12-04 PROCEDURE — 160035 HCHG PACU - 1ST 60 MINS PHASE I: Performed by: OTOLARYNGOLOGY

## 2018-12-04 RX ORDER — SCOLOPAMINE TRANSDERMAL SYSTEM 1 MG/1
PATCH, EXTENDED RELEASE TRANSDERMAL
Status: DISCONTINUED
Start: 2018-12-04 | End: 2018-12-04 | Stop reason: HOSPADM

## 2018-12-04 RX ORDER — OXYMETAZOLINE HYDROCHLORIDE 0.05 G/100ML
SPRAY NASAL
Status: DISCONTINUED
Start: 2018-12-04 | End: 2018-12-04 | Stop reason: HOSPADM

## 2018-12-04 RX ORDER — GABAPENTIN 300 MG/1
300 CAPSULE ORAL ONCE
Status: COMPLETED | OUTPATIENT
Start: 2018-12-04 | End: 2018-12-04

## 2018-12-04 RX ORDER — SODIUM CHLORIDE, SODIUM LACTATE, POTASSIUM CHLORIDE, CALCIUM CHLORIDE 600; 310; 30; 20 MG/100ML; MG/100ML; MG/100ML; MG/100ML
INJECTION, SOLUTION INTRAVENOUS ONCE
Status: COMPLETED | OUTPATIENT
Start: 2018-12-04 | End: 2018-12-04

## 2018-12-04 RX ORDER — OXYCODONE HCL 5 MG/5 ML
10 SOLUTION, ORAL ORAL
Status: DISCONTINUED | OUTPATIENT
Start: 2018-12-04 | End: 2018-12-04 | Stop reason: HOSPADM

## 2018-12-04 RX ORDER — BUPIVACAINE HYDROCHLORIDE AND EPINEPHRINE 5; 5 MG/ML; UG/ML
INJECTION, SOLUTION EPIDURAL; INTRACAUDAL; PERINEURAL
Status: DISCONTINUED
Start: 2018-12-04 | End: 2018-12-04 | Stop reason: HOSPADM

## 2018-12-04 RX ORDER — MORPHINE SULFATE 10 MG/ML
5 INJECTION, SOLUTION INTRAMUSCULAR; INTRAVENOUS
Status: DISCONTINUED | OUTPATIENT
Start: 2018-12-04 | End: 2018-12-04 | Stop reason: HOSPADM

## 2018-12-04 RX ORDER — BUPIVACAINE HYDROCHLORIDE AND EPINEPHRINE 5; 5 MG/ML; UG/ML
INJECTION, SOLUTION EPIDURAL; INTRACAUDAL; PERINEURAL
Status: DISCONTINUED | OUTPATIENT
Start: 2018-12-04 | End: 2018-12-04 | Stop reason: HOSPADM

## 2018-12-04 RX ORDER — ONDANSETRON 2 MG/ML
4 INJECTION INTRAMUSCULAR; INTRAVENOUS
Status: DISCONTINUED | OUTPATIENT
Start: 2018-12-04 | End: 2018-12-04 | Stop reason: HOSPADM

## 2018-12-04 RX ORDER — MORPHINE SULFATE 10 MG/ML
1 INJECTION, SOLUTION INTRAMUSCULAR; INTRAVENOUS
Status: DISCONTINUED | OUTPATIENT
Start: 2018-12-04 | End: 2018-12-04 | Stop reason: HOSPADM

## 2018-12-04 RX ORDER — OXYMETAZOLINE HYDROCHLORIDE 0.05 G/100ML
SPRAY NASAL
Status: DISCONTINUED | OUTPATIENT
Start: 2018-12-04 | End: 2018-12-04 | Stop reason: HOSPADM

## 2018-12-04 RX ORDER — DIPHENHYDRAMINE HYDROCHLORIDE 50 MG/ML
12.5 INJECTION INTRAMUSCULAR; INTRAVENOUS
Status: DISCONTINUED | OUTPATIENT
Start: 2018-12-04 | End: 2018-12-04 | Stop reason: HOSPADM

## 2018-12-04 RX ORDER — ACETAMINOPHEN 500 MG
1000 TABLET ORAL ONCE
Status: COMPLETED | OUTPATIENT
Start: 2018-12-04 | End: 2018-12-04

## 2018-12-04 RX ORDER — BACITRACIN ZINC 500 [USP'U]/G
OINTMENT TOPICAL
Status: DISCONTINUED
Start: 2018-12-04 | End: 2018-12-04 | Stop reason: HOSPADM

## 2018-12-04 RX ORDER — MORPHINE SULFATE 10 MG/ML
2 INJECTION, SOLUTION INTRAMUSCULAR; INTRAVENOUS
Status: DISCONTINUED | OUTPATIENT
Start: 2018-12-04 | End: 2018-12-04 | Stop reason: HOSPADM

## 2018-12-04 RX ORDER — DEXMEDETOMIDINE HYDROCHLORIDE 100 UG/ML
INJECTION, SOLUTION INTRAVENOUS
Status: DISCONTINUED
Start: 2018-12-04 | End: 2018-12-04 | Stop reason: HOSPADM

## 2018-12-04 RX ORDER — HALOPERIDOL 5 MG/ML
1 INJECTION INTRAMUSCULAR
Status: DISCONTINUED | OUTPATIENT
Start: 2018-12-04 | End: 2018-12-04 | Stop reason: HOSPADM

## 2018-12-04 RX ORDER — OXYCODONE HYDROCHLORIDE 5 MG/1
10 TABLET ORAL
Status: DISCONTINUED | OUTPATIENT
Start: 2018-12-04 | End: 2018-12-04 | Stop reason: HOSPADM

## 2018-12-04 RX ORDER — SODIUM CHLORIDE, SODIUM LACTATE, POTASSIUM CHLORIDE, CALCIUM CHLORIDE 600; 310; 30; 20 MG/100ML; MG/100ML; MG/100ML; MG/100ML
INJECTION, SOLUTION INTRAVENOUS CONTINUOUS
Status: DISCONTINUED | OUTPATIENT
Start: 2018-12-04 | End: 2018-12-04 | Stop reason: HOSPADM

## 2018-12-04 RX ORDER — OXYCODONE HCL 5 MG/5 ML
5 SOLUTION, ORAL ORAL
Status: DISCONTINUED | OUTPATIENT
Start: 2018-12-04 | End: 2018-12-04 | Stop reason: HOSPADM

## 2018-12-04 RX ORDER — OXYCODONE HYDROCHLORIDE 5 MG/1
5 TABLET ORAL
Status: DISCONTINUED | OUTPATIENT
Start: 2018-12-04 | End: 2018-12-04 | Stop reason: HOSPADM

## 2018-12-04 RX ORDER — MIDAZOLAM HYDROCHLORIDE 1 MG/ML
1 INJECTION INTRAMUSCULAR; INTRAVENOUS
Status: DISCONTINUED | OUTPATIENT
Start: 2018-12-04 | End: 2018-12-04 | Stop reason: HOSPADM

## 2018-12-04 RX ORDER — GINSENG 100 MG
CAPSULE ORAL
Status: DISCONTINUED | OUTPATIENT
Start: 2018-12-04 | End: 2018-12-04 | Stop reason: HOSPADM

## 2018-12-04 RX ORDER — MEPERIDINE HYDROCHLORIDE 25 MG/ML
6.25 INJECTION INTRAMUSCULAR; INTRAVENOUS; SUBCUTANEOUS
Status: DISCONTINUED | OUTPATIENT
Start: 2018-12-04 | End: 2018-12-04 | Stop reason: HOSPADM

## 2018-12-04 RX ADMIN — SODIUM CHLORIDE, SODIUM LACTATE, POTASSIUM CHLORIDE, CALCIUM CHLORIDE: 600; 310; 30; 20 INJECTION, SOLUTION INTRAVENOUS at 06:58

## 2018-12-04 RX ADMIN — ACETAMINOPHEN 1000 MG: 500 TABLET, FILM COATED ORAL at 06:50

## 2018-12-04 RX ADMIN — GABAPENTIN 300 MG: 300 CAPSULE ORAL at 06:50

## 2018-12-04 ASSESSMENT — PAIN SCALES - GENERAL
PAINLEVEL_OUTOF10: 0
PAINLEVEL_OUTOF10: 0
PAINLEVEL_OUTOF10: 5
PAINLEVEL_OUTOF10: 0

## 2018-12-04 NOTE — OP REPORT
DATE OF SERVICE:  12/04/2018    :  Portillo Avila MD    ANESTHESIOLOGIST:  Ana Rosa Sage MD    PREOPERATIVE DIAGNOSES:  1.  Nasal septal deviation.  2.  Chronic sinusitis.  3.  Turbinate hypertrophy.    POSTOPERATIVE DIAGNOSES:  1.  Nasal septal deviation.  2.  Chronic sinusitis.  3.  Turbinate hypertrophy.    OPERATION PERFORMED:  1.  Bilateral endoscopic intranasal total ethmoidectomy.  2.  Septoplasty.  3.  Bilateral turbinoplasty.  4.  Bilateral endoscopic nasal antrostomies.  5.  Tarkio image-guided procedure.    DESCRIPTION OF OPERATION:  Under general anesthesia with an endotracheal tube,   patient was placed in a supine position.  The head was draped in the usual   manner.  The registration device was placed on the forehead and the patient   was registered for landmark image-guided procedure.    Afrin pledgets were packed into the nose for vasoconstriction.  Marcaine 0.5%   with 1:100,000 adrenalin injected into the septal pocket at the base of the   columella into both inferior turbinates and both middle meatus regions.    A 30-degree scope was inserted on the left side.  The patient had large amount   of polypoid tissue filling the entire ethmoid system.  Utilizing a 0-degree   scope, the shaver was utilized to remove the polyps.  The ostium to the   maxillary sinus was then visualized with the 30-degree scope and opened up   anterior Nima-Cut forceps and backbiting forceps.  Remaining tissue superiorly   on the wall was removed with the shaver.    A 0-degree scope was inserted.  Anterior ethmoidectomy was then carried out by   removing polyps into the ethmoid bulla posteriorly into the posterior   ethmoids, which were then opened up and cleaned out completely.  On the right   side, the antrostomy was then carried out in a similar manner.  On the right   side from previous surgery, the middle turbinate had been completely resected.    The image-guided system was then utilized to visualize the  right plane.  The   total ethmoidectomy was then carried out from anterior to posterior with a   straight Nima-Cut with a straight cutting shaver.  The antrostomy then was   further opened up with backbiting forceps anteriorly connecting into the   natural ostium.    A columella clamp was applied.  A right hemitransfixion incision was   performed.  The mucoperichondrium and mucoperiosteum was elevated off each   side of the septum.  Deviated portions of cartilage and bone removed, other   portions were straightened and rotated back into the midline.  Large spur was   removed inferiorly with a chisel.  It was also necessary to score the septum   superiorly on the left side to allow to rotate to the right.  Patient had a   severe deviated septum to the left side.  This moved the septum over nicely.    The turbinoplasty was then carried out by clamping the inferior turbinates   with large Nivia clamps.  The turbinate on the left side was then trimmed   along the clamp line with turbinate scissors.  The submucous resection was   completed by elevating the mucosa off the bone of the inferior turbinate and   additional bone of the inferior turbinate was removed.  The remaining   turbinate was outfractured.  The right turbinoplasty was then carried out in a   similar manner.  Hemostasis was secured with electrocautery along the right   edge of the turbinate.    The hemitransfixion incision was closed with 3-0 chromic.  Nasopore packs were   packed into both ethmoid defects.  Oneill splints were placed bilaterally and   sutured across septum with 3-0 silk.  Nasopore was then packed against each   inferior turbinate.  The patient tolerated the procedure well, left the   operating room in good condition.       ____________________________________     Bud MD DEE Mcfadden / KATHARINE    DD:  12/04/2018 09:17:34  DT:  12/04/2018 09:41:24    D#:  [id number]  Job#:  [id number]

## 2018-12-04 NOTE — OR SURGEON
Immediate Post OP Note    PreOp Diagnosis: NSD, turbinate Hypertrophy, Chronic Sinusitis    PostOp Diagnosis: same    Procedure(s):  SEPTOPLASTY - Wound Class: Clean Contaminated  TURBINOPLASTY - Wound Class: Clean Contaminated  ETHMOIDECTOMY- ENDOSCOPIC TOTAL - Wound Class: Clean Contaminated  ANTROSTOMY- ENDOSCOPIC MAXILLARY - Wound Class: Clean Contaminated    Surgeon(s):  Portillo Avila M.D.    Anesthesiologist/Type of Anesthesia:  Anesthesiologist: Ana Rosa Sage M.D./General    Surgical Staff:  Circulator: Rachana Vizcarra R.N.  Relief Circulator: Lori Lovett R.N.  Scrub Person: Rehana Artis    Specimens removed if any:  ID Type Source Tests Collected by Time Destination   A : left sinus content Other Other PATHOLOGY SPECIMEN Portillo Avila M.D. 12/4/2018 0810    B : right sinus content Other Other PATHOLOGY SPECIMEN Portillo Avila M.D. 12/4/2018 0811        Estimated Blood Loss: 30cc    Findings: see op report    Complications: none        12/4/2018 9:10 AM Portillo Avila M.D.

## 2018-12-04 NOTE — DISCHARGE INSTRUCTIONS
ACTIVITY: Rest and take it easy for the first 24 hours.  A responsible adult is recommended to remain with you during that time.  It is normal to feel sleepy.  We encourage you to not do anything that requires balance, judgment or coordination.    MILD FLU-LIKE SYMPTOMS ARE NORMAL. YOU MAY EXPERIENCE GENERALIZED MUSCLE ACHES, THROAT IRRITATION, HEADACHE AND/OR SOME NAUSEA.    FOR 24 HOURS DO NOT:  Drive, operate machinery or run household appliances.  Drink beer or alcoholic beverages.   Make important decisions or sign legal documents.    SPECIAL INSTRUCTIONS: *Follow instruction handout**    DIET: To avoid nausea, slowly advance diet as tolerated, avoiding spicy or greasy foods for the first day.  Add more substantial food to your diet according to your physician's instructions.  Babies can be fed formula or breast milk as soon as they are hungry.  INCREASE FLUIDS AND FIBER TO AVOID CONSTIPATION.    SURGICAL DRESSING/BATHING: *Avoid hot, steamy showers**    FOLLOW-UP APPOINTMENT:  A follow-up appointment should be arranged with your doctor; call to schedule.    You should CALL YOUR PHYSICIAN if you develop:  Fever greater than 101 degrees F.  Pain not relieved by medication, or persistent nausea or vomiting.  Excessive bleeding (blood soaking through dressing) or unexpected drainage from the wound.  Extreme redness or swelling around the incision site, drainage of pus or foul smelling drainage.  Inability to urinate or empty your bladder within 8 hours.  Problems with breathing or chest pain.    You should call 911 if you develop problems with breathing or chest pain.  If you are unable to contact your doctor or surgical center, you should go to the nearest emergency room or urgent care center.    Physician's telephone #: *Dr. Avila [phone]**    If any questions arise, call your doctor.  If your doctor is not available, please feel free to call the Surgical Center at [phone].  The Center is open Monday  through Friday from 7AM to 7PM.  You can also call the HEALTH HOTLINE open 24 hours/day, 7 days/week and speak to a nurse at [phone], or toll free at [phone].    A registered nurse may call you a few days after your surgery to see how you are doing after your procedure.    MEDICATIONS: Resume taking daily medication.  Take prescribed pain medication with food.  If no medication is prescribed, you may take non-aspirin pain medication if needed.  PAIN MEDICATION CAN BE VERY CONSTIPATING.  Take a stool softener or laxative such as senokot, pericolace, or milk of magnesia if needed.    Prescription given for *Norco, Omnicef, Zofran**.  Last pain medication given at *___________**.    If your physician has prescribed pain medication that includes Acetaminophen (Tylenol), do not take additional Acetaminophen (Tylenol) while taking the prescribed medication.    Depression / Suicide Risk    As you are discharged from this [facility] facility, it is important to learn how to keep safe from harming yourself.    Recognize the warning signs:  · Abrupt changes in personality, positive or negative- including increase in energy   · Giving away possessions  · Change in eating patterns- significant weight changes-  positive or negative  · Change in sleeping patterns- unable to sleep or sleeping all the time   · Unwillingness or inability to communicate  · Depression  · Unusual sadness, discouragement and loneliness  · Talk of wanting to die  · Neglect of personal appearance   · Rebelliousness- reckless behavior  · Withdrawal from people/activities they love  · Confusion- inability to concentrate     If you or a loved one observes any of these behaviors or has concerns about self-harm, here's what you can do:  · Talk about it- your feelings and reasons for harming yourself  · Remove any means that you might use to hurt yourself (examples: pills, rope, extension cords, firearm)  · Get professional help from the  community (Mental Health, Substance Abuse, psychological counseling)  · Do not be alone:Call your Safe Contact- someone whom you trust who will be there for you.  · Call your local CRISIS HOTLINE 789-4839 or 743-662-5149  · Call your local Children's Mobile Crisis Response Team [place] (562) 299-6457 or www.Innotrieve  · Call the toll free National Suicide Prevention Hotlines   · National Suicide Prevention Lifeline 851-433-BLXQ (4261)  · National Hope Line Network 800-SUICIDE (315-8787)

## 2018-12-04 NOTE — OR NURSING
0847 Pt transferred to PACU. Report received from OR RN and anesthesia. Appears to have no distress at this time. VS stable, respirations even and unlabored. No bleeding or drainage noted from nares.    0950 Pt more awake. No c/o nausea or pain. Tolerating sips of water. Placed on room air.     1036 Pt and friend educated on discharge instructions. Verbalized understanding. All questions answered. All belongings are with patient. Pt discharged home.

## 2019-03-13 ENCOUNTER — HOSPITAL ENCOUNTER (OUTPATIENT)
Dept: LAB | Facility: MEDICAL CENTER | Age: 24
End: 2019-03-13
Attending: OBSTETRICS & GYNECOLOGY
Payer: COMMERCIAL

## 2019-03-13 LAB
ABO GROUP BLD: NORMAL
B-HCG SERPL-ACNC: ABNORMAL MIU/ML (ref 0–5)
PROGEST SERPL-MCNC: 19.5 NG/ML
RH BLD: NORMAL

## 2019-03-13 PROCEDURE — 86901 BLOOD TYPING SEROLOGIC RH(D): CPT

## 2019-03-13 PROCEDURE — 86900 BLOOD TYPING SEROLOGIC ABO: CPT

## 2019-03-13 PROCEDURE — 84144 ASSAY OF PROGESTERONE: CPT

## 2019-03-13 PROCEDURE — 84702 CHORIONIC GONADOTROPIN TEST: CPT

## 2019-03-13 PROCEDURE — 86794 ZIKA VIRUS IGM ANTIBODY: CPT

## 2019-03-15 ENCOUNTER — HOSPITAL ENCOUNTER (OUTPATIENT)
Dept: LAB | Facility: MEDICAL CENTER | Age: 24
End: 2019-03-15
Attending: OBSTETRICS & GYNECOLOGY
Payer: COMMERCIAL

## 2019-03-15 LAB
B-HCG SERPL-ACNC: ABNORMAL MIU/ML (ref 0–5)
PREGNANT CURRENTLY PHENX: YES
RELEVANT ZIKA VIRUS EXPOSURE? Q5911: YES
SYMPTOM: NO
ZIKV IGM SERPL QL IA: NEGATIVE

## 2019-03-15 PROCEDURE — 84702 CHORIONIC GONADOTROPIN TEST: CPT

## 2019-03-22 ENCOUNTER — HOSPITAL ENCOUNTER (OUTPATIENT)
Dept: LAB | Facility: MEDICAL CENTER | Age: 24
End: 2019-03-22
Attending: OBSTETRICS & GYNECOLOGY
Payer: COMMERCIAL

## 2019-03-22 LAB — B-HCG SERPL-ACNC: ABNORMAL MIU/ML (ref 0–5)

## 2019-03-22 PROCEDURE — 84702 CHORIONIC GONADOTROPIN TEST: CPT

## 2019-03-23 ENCOUNTER — OFFICE VISIT (OUTPATIENT)
Dept: URGENT CARE | Facility: MEDICAL CENTER | Age: 24
End: 2019-03-23
Payer: COMMERCIAL

## 2019-03-23 VITALS
RESPIRATION RATE: 16 BRPM | BODY MASS INDEX: 24.58 KG/M2 | OXYGEN SATURATION: 99 % | HEART RATE: 63 BPM | TEMPERATURE: 98.5 F | DIASTOLIC BLOOD PRESSURE: 62 MMHG | WEIGHT: 130.2 LBS | SYSTOLIC BLOOD PRESSURE: 104 MMHG | HEIGHT: 61 IN

## 2019-03-23 DIAGNOSIS — H66.001 ACUTE SUPPURATIVE OTITIS MEDIA OF RIGHT EAR WITHOUT SPONTANEOUS RUPTURE OF TYMPANIC MEMBRANE, RECURRENCE NOT SPECIFIED: ICD-10-CM

## 2019-03-23 DIAGNOSIS — H72.91 PERFORATION OF RIGHT TYMPANIC MEMBRANE: ICD-10-CM

## 2019-03-23 PROCEDURE — 99214 OFFICE O/P EST MOD 30 MIN: CPT | Performed by: NURSE PRACTITIONER

## 2019-03-23 RX ORDER — AMOXICILLIN AND CLAVULANATE POTASSIUM 875; 125 MG/1; MG/1
1 TABLET, FILM COATED ORAL 2 TIMES DAILY
Qty: 20 TAB | Refills: 0 | Status: ON HOLD | OUTPATIENT
Start: 2019-03-23 | End: 2022-01-30

## 2019-03-23 ASSESSMENT — ENCOUNTER SYMPTOMS
DIARRHEA: 0
HEADACHES: 0
RHINORRHEA: 0
VOMITING: 0
NECK PAIN: 0
ABDOMINAL PAIN: 0
COUGH: 0
SORE THROAT: 0

## 2019-03-23 NOTE — PROGRESS NOTES
"Subjective:      Marilin Sears [facility] is a 23 y.o. female who presents with Otalgia    Denies past medical, surgical or family history that is significant to today's problem.   RX or OTC medications-- reviewed with patient today.   Allergies   Allergen Reactions   • Tetracyclines Rash   • Other Drug Anaphylaxis     Steroids             Otalgia    There is pain in the right ear. This is a new problem. The current episode started in the past 7 days. The problem occurs constantly. The problem has been gradually worsening. There has been no fever. The pain is at a severity of 5/10. The pain is moderate. Pertinent negatives include no abdominal pain, coughing, diarrhea, ear discharge, headaches, hearing loss, neck pain, rash, rhinorrhea, sore throat or vomiting. Associated symptoms comments: History of sinus problems. Uses Neti pot. Now having fluid run out of her right ear with increasing pain. . She has tried NSAIDs for the symptoms. The treatment provided no relief. There is no history of a chronic ear infection, hearing loss or a tympanostomy tube (hx of tubes as a child).   Had sinus surgery 3 months ago. Still having sinus pain/ pressure.     Review of Systems   HENT: Positive for ear pain. Negative for ear discharge, hearing loss, rhinorrhea and sore throat.    Respiratory: Negative for cough.    Gastrointestinal: Negative for abdominal pain, diarrhea and vomiting.   Musculoskeletal: Negative for neck pain.   Skin: Negative for rash.   Neurological: Negative for headaches.          Objective:     /62   Pulse 63   Temp 36.9 °C (98.5 °F) (Temporal)   Resp 16   Ht 1.549 m (5' 1\")   Wt 59.1 kg (130 lb 3.2 oz)   SpO2 99%   BMI 24.60 kg/m²      Physical Exam   Constitutional: She is oriented to person, place, and time. Vital signs are normal. She appears well-developed and well-nourished. She is cooperative.  Non-toxic appearance. She does not appear ill.   HENT:   Head: Normocephalic.   Right Ear: " Hearing, external ear and ear canal normal. Tympanic membrane mobility is abnormal (scarring ).   Left Ear: Hearing, external ear and ear canal normal. There is drainage, swelling and tenderness. Tympanic membrane is perforated. A middle ear effusion is present.   Eyes: Conjunctivae are normal.   Neck: Normal range of motion. Neck supple.   Cardiovascular: Normal rate and regular rhythm.    Pulmonary/Chest: Effort normal and breath sounds normal. No respiratory distress.   Musculoskeletal: Normal range of motion.   Lymphadenopathy:        Head (right side): No tonsillar adenopathy present.        Head (left side): No tonsillar adenopathy present.     She has no cervical adenopathy.        Right: No supraclavicular adenopathy present.        Left: No supraclavicular adenopathy present.   Neurological: She is alert and oriented to person, place, and time. She has normal reflexes.   Skin: Skin is warm and dry.   Psychiatric: She has a normal mood and affect. Her speech is normal and behavior is normal.   Nursing note and vitals reviewed.              Assessment/Plan:     1. Acute suppurative otitis media of right ear without spontaneous rupture of tympanic membrane, recurrence not specified    - amoxicillin-clavulanate (AUGMENTIN) 875-125 MG Tab; Take 1 Tab by mouth 2 times a day.  Dispense: 20 Tab; Refill: 0  - REFERRAL TO ENT    2. Perforation of right tympanic membrane    - amoxicillin-clavulanate (AUGMENTIN) 875-125 MG Tab; Take 1 Tab by mouth 2 times a day.  Dispense: 20 Tab; Refill: 0  - REFERRAL TO ENT    Educated in proper administration of medication(s) ordered today including safety, possible SE, risks, benefits, rationale and alternatives to therapy.   Return to clinic or PCP  5-7  days if current symptoms are not resolving in a satisfactory manner or sooner if new or worsening symptoms occur.   Patient was advised of signs and symptoms which would warrant further evaluation and /or emergent evaluation in  ER.  Verbalized agreement with this treatment plan and seemed to understand without barriers. Questions were encouraged and answered to patients satisfaction.   Aftercare instructions were given to pt  Stop Neti pot until evaluated by ENT   No submerging head/ ear in water until evaluated by ENT   OTC age appropriate, analgesic of choice. Follow manufactures dosing and safety precautions.

## 2019-05-02 ENCOUNTER — HOSPITAL ENCOUNTER (OUTPATIENT)
Dept: LAB | Facility: MEDICAL CENTER | Age: 24
End: 2019-05-02
Attending: OBSTETRICS & GYNECOLOGY
Payer: COMMERCIAL

## 2019-05-02 LAB — B-HCG SERPL-ACNC: 6.7 MIU/ML (ref 0–5)

## 2019-05-02 PROCEDURE — 84702 CHORIONIC GONADOTROPIN TEST: CPT

## 2019-09-24 ENCOUNTER — HOSPITAL ENCOUNTER (OUTPATIENT)
Dept: LAB | Facility: MEDICAL CENTER | Age: 24
End: 2019-09-24
Payer: COMMERCIAL

## 2019-09-24 LAB
BDY FAT % MEASURED: 21.9 %
BP DIAS: 65 MMHG
BP SYS: 119 MMHG
CHOLEST SERPL-MCNC: 214 MG/DL (ref 100–199)
DIABETES HTDIA: NO
EVENT NAME HTEVT: NORMAL
FASTING HTFAS: YES
GLUCOSE SERPL-MCNC: 77 MG/DL (ref 65–99)
HDLC SERPL-MCNC: 63 MG/DL
HYPERTENSION HTHYP: NO
LDLC SERPL CALC-MCNC: 130 MG/DL
SCREENING LOC CITY HTCIT: NORMAL
SCREENING LOC STATE HTSTA: NORMAL
SCREENING LOCATION HTLOC: NORMAL
SMOKING HTSMO: NO
SUBSCRIBER ID HTSID: NORMAL
TRIGL SERPL-MCNC: 104 MG/DL (ref 0–149)

## 2019-09-24 PROCEDURE — 82947 ASSAY GLUCOSE BLOOD QUANT: CPT

## 2019-09-24 PROCEDURE — S5190 WELLNESS ASSESSMENT BY NONPH: HCPCS

## 2019-09-24 PROCEDURE — 36415 COLL VENOUS BLD VENIPUNCTURE: CPT

## 2019-09-24 PROCEDURE — 80061 LIPID PANEL: CPT

## 2019-10-15 ENCOUNTER — EH NON-PROVIDER (OUTPATIENT)
Dept: OCCUPATIONAL MEDICINE | Facility: CLINIC | Age: 24
End: 2019-10-15

## 2019-10-15 DIAGNOSIS — Z02.89 ENCOUNTER FOR OCCUPATIONAL HEALTH EXAMINATION INVOLVING RESPIRATOR: Primary | ICD-10-CM

## 2019-10-15 PROCEDURE — 94375 RESPIRATORY FLOW VOLUME LOOP: CPT | Performed by: PREVENTIVE MEDICINE

## 2020-12-16 DIAGNOSIS — Z23 NEED FOR VACCINATION: ICD-10-CM

## 2020-12-21 ENCOUNTER — APPOINTMENT (OUTPATIENT)
Dept: FAMILY PLANNING/WOMEN'S HEALTH CLINIC | Facility: IMMUNIZATION CENTER | Age: 25
End: 2020-12-21
Attending: FAMILY MEDICINE
Payer: COMMERCIAL

## 2020-12-21 ENCOUNTER — IMMUNIZATION (OUTPATIENT)
Dept: FAMILY PLANNING/WOMEN'S HEALTH CLINIC | Facility: IMMUNIZATION CENTER | Age: 25
End: 2020-12-21
Payer: COMMERCIAL

## 2020-12-21 DIAGNOSIS — Z23 ENCOUNTER FOR VACCINATION: Primary | ICD-10-CM

## 2020-12-21 DIAGNOSIS — Z23 NEED FOR VACCINATION: ICD-10-CM

## 2020-12-21 PROCEDURE — 91300 PFIZER SARS-COV-2 VACCINE: CPT

## 2020-12-21 PROCEDURE — 0001A PFIZER SARS-COV-2 VACCINE: CPT

## 2021-01-08 ENCOUNTER — NON-PROVIDER VISIT (OUTPATIENT)
Dept: OCCUPATIONAL MEDICINE | Facility: CLINIC | Age: 26
End: 2021-01-08

## 2021-01-08 ENCOUNTER — HOSPITAL ENCOUNTER (OUTPATIENT)
Facility: MEDICAL CENTER | Age: 26
End: 2021-01-08
Attending: NURSE PRACTITIONER
Payer: COMMERCIAL

## 2021-01-08 DIAGNOSIS — Z02.89 VISIT FOR OCCUPATIONAL HEALTH EXAMINATION: ICD-10-CM

## 2021-01-08 DIAGNOSIS — Z02.89 VISIT FOR OCCUPATIONAL HEALTH EXAMINATION: Primary | ICD-10-CM

## 2021-01-08 PROCEDURE — 86480 TB TEST CELL IMMUN MEASURE: CPT | Performed by: NURSE PRACTITIONER

## 2021-01-10 LAB
GAMMA INTERFERON BACKGROUND BLD IA-ACNC: 0.05 IU/ML
M TB IFN-G BLD-IMP: NEGATIVE
M TB IFN-G CD4+ BCKGRND COR BLD-ACNC: 0.01 IU/ML
MITOGEN IGNF BCKGRD COR BLD-ACNC: >10 IU/ML
QFT TB2 - NIL TBQ2: 0 IU/ML

## 2021-01-11 ENCOUNTER — IMMUNIZATION (OUTPATIENT)
Dept: FAMILY PLANNING/WOMEN'S HEALTH CLINIC | Facility: IMMUNIZATION CENTER | Age: 26
End: 2021-01-11
Attending: FAMILY MEDICINE
Payer: COMMERCIAL

## 2021-01-11 DIAGNOSIS — Z23 ENCOUNTER FOR VACCINATION: Primary | ICD-10-CM

## 2021-01-11 PROCEDURE — 0002A PFIZER SARS-COV-2 VACCINE: CPT

## 2021-01-11 PROCEDURE — 91300 PFIZER SARS-COV-2 VACCINE: CPT

## 2021-01-29 ENCOUNTER — HOSPITAL ENCOUNTER (OUTPATIENT)
Dept: LAB | Facility: MEDICAL CENTER | Age: 26
End: 2021-01-29
Attending: EMERGENCY MEDICINE
Payer: COMMERCIAL

## 2021-01-29 LAB
COVID ORDER STATUS COVID19: NORMAL
SARS-COV-2 RNA RESP QL NAA+PROBE: NOTDETECTED
SPECIMEN SOURCE: NORMAL

## 2021-03-13 ENCOUNTER — EH NON-PROVIDER (OUTPATIENT)
Dept: OCCUPATIONAL MEDICINE | Facility: CLINIC | Age: 26
End: 2021-03-13

## 2021-03-13 PROCEDURE — 94375 RESPIRATORY FLOW VOLUME LOOP: CPT | Performed by: PHYSICIAN ASSISTANT

## 2021-07-21 ENCOUNTER — HOSPITAL ENCOUNTER (OUTPATIENT)
Dept: LAB | Facility: MEDICAL CENTER | Age: 26
End: 2021-07-21
Attending: OBSTETRICS & GYNECOLOGY
Payer: COMMERCIAL

## 2021-07-21 PROCEDURE — 87340 HEPATITIS B SURFACE AG IA: CPT

## 2021-07-21 PROCEDURE — 87086 URINE CULTURE/COLONY COUNT: CPT

## 2021-07-21 PROCEDURE — 86901 BLOOD TYPING SEROLOGIC RH(D): CPT

## 2021-07-21 PROCEDURE — 87389 HIV-1 AG W/HIV-1&-2 AB AG IA: CPT

## 2021-07-21 PROCEDURE — 85025 COMPLETE CBC W/AUTO DIFF WBC: CPT

## 2021-07-21 PROCEDURE — 86762 RUBELLA ANTIBODY: CPT

## 2021-07-21 PROCEDURE — 86850 RBC ANTIBODY SCREEN: CPT

## 2021-07-21 PROCEDURE — 86900 BLOOD TYPING SEROLOGIC ABO: CPT

## 2021-07-21 PROCEDURE — 86780 TREPONEMA PALLIDUM: CPT

## 2021-07-21 PROCEDURE — 86787 VARICELLA-ZOSTER ANTIBODY: CPT

## 2021-07-21 PROCEDURE — 36415 COLL VENOUS BLD VENIPUNCTURE: CPT

## 2021-07-21 PROCEDURE — 86803 HEPATITIS C AB TEST: CPT

## 2021-07-22 LAB
ABO GROUP BLD: NORMAL
BASOPHILS # BLD AUTO: 0.7 % (ref 0–1.8)
BASOPHILS # BLD: 0.06 K/UL (ref 0–0.12)
BLD GP AB SCN SERPL QL: NORMAL
EOSINOPHIL # BLD AUTO: 0.37 K/UL (ref 0–0.51)
EOSINOPHIL NFR BLD: 4.4 % (ref 0–6.9)
ERYTHROCYTE [DISTWIDTH] IN BLOOD BY AUTOMATED COUNT: 41 FL (ref 35.9–50)
HBV SURFACE AG SER QL: NORMAL
HCT VFR BLD AUTO: 37.6 % (ref 37–47)
HCV AB SER QL: NORMAL
HGB BLD-MCNC: 12.6 G/DL (ref 12–16)
HIV 1+2 AB+HIV1 P24 AG SERPL QL IA: NORMAL
IMM GRANULOCYTES # BLD AUTO: 0.04 K/UL (ref 0–0.11)
IMM GRANULOCYTES NFR BLD AUTO: 0.5 % (ref 0–0.9)
LYMPHOCYTES # BLD AUTO: 1.92 K/UL (ref 1–4.8)
LYMPHOCYTES NFR BLD: 22.8 % (ref 22–41)
MCH RBC QN AUTO: 31 PG (ref 27–33)
MCHC RBC AUTO-ENTMCNC: 33.5 G/DL (ref 33.6–35)
MCV RBC AUTO: 92.4 FL (ref 81.4–97.8)
MONOCYTES # BLD AUTO: 0.56 K/UL (ref 0–0.85)
MONOCYTES NFR BLD AUTO: 6.6 % (ref 0–13.4)
NEUTROPHILS # BLD AUTO: 5.48 K/UL (ref 2–7.15)
NEUTROPHILS NFR BLD: 65 % (ref 44–72)
NRBC # BLD AUTO: 0 K/UL
NRBC BLD-RTO: 0 /100 WBC
PLATELET # BLD AUTO: 186 K/UL (ref 164–446)
PMV BLD AUTO: 11 FL (ref 9–12.9)
RBC # BLD AUTO: 4.07 M/UL (ref 4.2–5.4)
RH BLD: NORMAL
RUBV AB SER QL: 208 IU/ML
TREPONEMA PALLIDUM IGG+IGM AB [PRESENCE] IN SERUM OR PLASMA BY IMMUNOASSAY: NORMAL
VZV IGG SER IA-ACNC: 1.76
WBC # BLD AUTO: 8.4 K/UL (ref 4.8–10.8)

## 2021-07-24 LAB
BACTERIA UR CULT: NORMAL
SIGNIFICANT IND 70042: NORMAL
SITE SITE: NORMAL
SOURCE SOURCE: NORMAL

## 2021-08-17 ENCOUNTER — HOSPITAL ENCOUNTER (OUTPATIENT)
Facility: MEDICAL CENTER | Age: 26
End: 2021-08-17
Attending: OBSTETRICS & GYNECOLOGY
Payer: COMMERCIAL

## 2021-08-17 PROCEDURE — 81329 SMN1 GENE DOS/DELETION ALYS: CPT

## 2021-08-17 PROCEDURE — 81243 FMR1 GEN ALY DETC ABNL ALLEL: CPT

## 2021-08-17 PROCEDURE — 81220 CFTR GENE COM VARIANTS: CPT

## 2021-08-24 LAB
CF EXPANDED VARIANT PANEL INTERP Q4864: NORMAL
CFTR ALLELE 1 BLD/T QL: NEGATIVE
CFTR ALLELE 1 BLD/T QL: NEGATIVE
CFTR MUT ANL BLD/T: NORMAL
FMR1 ALLELE 2 CGG RPT ENTNUM BLD/T: 32 CGG REPEATS
FMR1 ALLELE1 CGG RPT ENTNUM BLD/T: 36 CGG REPEATS
FMR1 GENE MUT ANL BLD/T: NORMAL
FMR1 GENE MUT ANL BLD/T: NORMAL
GEN STRUCT VAR COPY NUM: NORMAL
SMN1 GENE MUT ANL BLD/T: NORMAL
SMN1+SMN2 GENE MUT ANL BLD/T: NORMAL
SMN2 GENE MUT ANL BLD/T: NORMAL
SPECIMEN SOURCE: NORMAL
SYMPTOM: NORMAL

## 2021-08-30 ENCOUNTER — EH NON-PROVIDER (OUTPATIENT)
Dept: OCCUPATIONAL MEDICINE | Facility: CLINIC | Age: 26
End: 2021-08-30

## 2021-09-22 ENCOUNTER — IMMUNIZATION (OUTPATIENT)
Dept: OCCUPATIONAL MEDICINE | Facility: CLINIC | Age: 26
End: 2021-09-22

## 2021-09-22 DIAGNOSIS — Z23 NEED FOR VACCINATION: Primary | ICD-10-CM

## 2021-09-25 PROCEDURE — 90686 IIV4 VACC NO PRSV 0.5 ML IM: CPT | Performed by: PREVENTIVE MEDICINE

## 2021-10-04 ENCOUNTER — HOSPITAL ENCOUNTER (EMERGENCY)
Facility: MEDICAL CENTER | Age: 26
End: 2021-10-04
Attending: OBSTETRICS & GYNECOLOGY | Admitting: OBSTETRICS & GYNECOLOGY
Payer: COMMERCIAL

## 2021-10-04 ENCOUNTER — APPOINTMENT (OUTPATIENT)
Dept: RADIOLOGY | Facility: MEDICAL CENTER | Age: 26
End: 2021-10-04
Attending: OBSTETRICS & GYNECOLOGY
Payer: COMMERCIAL

## 2021-10-04 VITALS
HEART RATE: 80 BPM | SYSTOLIC BLOOD PRESSURE: 116 MMHG | TEMPERATURE: 97.3 F | OXYGEN SATURATION: 95 % | RESPIRATION RATE: 18 BRPM | DIASTOLIC BLOOD PRESSURE: 73 MMHG

## 2021-10-04 PROCEDURE — 302449 STATCHG TRIAGE ONLY (STATISTIC)

## 2021-10-04 PROCEDURE — 76817 TRANSVAGINAL US OBSTETRIC: CPT

## 2021-10-04 NOTE — PROGRESS NOTES
PT arrived with [employer] JULIETTE's 2 days ago while working and started having brown discharge yesterday and 2 small clots today..    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    Vaginal US ordered for cervical length.    1442 RN at BS to discuss DC, PT to return for VB, UCs or LOF

## 2021-10-05 NOTE — CONSULTS
DATE OF SERVICE:  10/04/2021     HISTORY OF PRESENT ILLNESS:  The patient is a 26-year-old  2, para 0 at   22 weeks, who presented to labor and delivery for some cramping and vaginal   bleeding.  She noted some mild cramping for a few minutes each day for the   last 2 days.  This morning, when she went to wipe, she initially noted some   brown tinged discharge followed by the passage of a small clot that was less   than size of a quarter.  Since that time, the cramping and bleeding has   stopped.  She denies loss of fluid, overt bright red blood vaginal bleeding   and reports that she is feeling fetal movement.     This pregnancy has been uncomplicated.  She is followed by Dr. Hurtado.  She has   a normal fetal anatomical survey and does not have evidence of a placenta   previa.  She is Rh positive.     PAST MEDICAL HISTORY:  None.     PAST SURGICAL HISTORY:  None.     MEDICATIONS:  Prenatal vitamins.     ALLERGIES:  NKDA.     GYNECOLOGIC HISTORY:  No history of sexually transmitted disease.     OBSTETRICAL HISTORY:  The patient had 1 spontaneous  at 8 weeks,    2 with current pregnancy.     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     OBJECTIVE:  VITAL SIGNS:  Afebrile.  Vital signs stable.  GENERAL:  She is a well-developed, well-nourished female in no acute distress.  ABDOMEN:  Gravid and nontender to palpation.  PELVIC:  Sterile speculum exam shows that she is closed, thick and high.    There is a slight brown tinged discharge in the vaginal vault.  Cervix appears   visually closed.  On bimanual exam, the cervix is closed, thick and high.    Tocometer is quiescent.  Fetal heart tones present.     Transvaginal ultrasound shows cervix measuring 3.4 cm in length, closed, small   debris in the cervical canal.     ASSESSMENT AND PLAN:  This is a 26-year-old  2, para 0 at 22 weeks,   here for vaginal bleeding.  1.  Vaginal bleeding.  The patient is  hemodynamically stable and the patient   shows no additional bleeding at this time.  Cervix was closed.  She does not   have evidence of placenta previa.  Precautions for decreased activity and   bleeding have been reviewed with her.  2.  Fetal heart tones are positive.  3.  Rh positive.  4.  The patient will follow up with Dr. Hurtado as previously scheduled.        ______________________________  MD ROBERTO WOMACK/DENISE/JAMES    DD:  10/04/2021 15:59  DT:  10/04/2021 17:44    Job#:  [id number]

## 2021-10-21 ENCOUNTER — HOSPITAL ENCOUNTER (OUTPATIENT)
Facility: MEDICAL CENTER | Age: 26
End: 2021-10-21
Attending: OBSTETRICS & GYNECOLOGY
Payer: COMMERCIAL

## 2021-10-21 PROCEDURE — 82950 GLUCOSE TEST: CPT

## 2021-10-21 PROCEDURE — 86780 TREPONEMA PALLIDUM: CPT

## 2021-10-21 PROCEDURE — 85025 COMPLETE CBC W/AUTO DIFF WBC: CPT

## 2021-10-22 LAB
BASOPHILS # BLD AUTO: 0.4 % (ref 0–1.8)
BASOPHILS # BLD: 0.04 K/UL (ref 0–0.12)
EOSINOPHIL # BLD AUTO: 0.35 K/UL (ref 0–0.51)
EOSINOPHIL NFR BLD: 3.2 % (ref 0–6.9)
ERYTHROCYTE [DISTWIDTH] IN BLOOD BY AUTOMATED COUNT: 50.8 FL (ref 35.9–50)
GLUCOSE 1H P 50 G GLC PO SERPL-MCNC: 125 MG/DL (ref 70–139)
HCT VFR BLD AUTO: 37.7 % (ref 37–47)
HGB BLD-MCNC: 11.7 G/DL (ref 12–16)
IMM GRANULOCYTES # BLD AUTO: 0.1 K/UL (ref 0–0.11)
IMM GRANULOCYTES NFR BLD AUTO: 0.9 % (ref 0–0.9)
LYMPHOCYTES # BLD AUTO: 1.87 K/UL (ref 1–4.8)
LYMPHOCYTES NFR BLD: 17.3 % (ref 22–41)
MCH RBC QN AUTO: 31.6 PG (ref 27–33)
MCHC RBC AUTO-ENTMCNC: 31 G/DL (ref 33.6–35)
MCV RBC AUTO: 101.9 FL (ref 81.4–97.8)
MONOCYTES # BLD AUTO: 0.62 K/UL (ref 0–0.85)
MONOCYTES NFR BLD AUTO: 5.7 % (ref 0–13.4)
NEUTROPHILS # BLD AUTO: 7.83 K/UL (ref 2–7.15)
NEUTROPHILS NFR BLD: 72.5 % (ref 44–72)
NRBC # BLD AUTO: 0 K/UL
NRBC BLD-RTO: 0 /100 WBC
PLATELET # BLD AUTO: 181 K/UL (ref 164–446)
PMV BLD AUTO: 11.2 FL (ref 9–12.9)
RBC # BLD AUTO: 3.7 M/UL (ref 4.2–5.4)
TREPONEMA PALLIDUM IGG+IGM AB [PRESENCE] IN SERUM OR PLASMA BY IMMUNOASSAY: NORMAL
WBC # BLD AUTO: 10.8 K/UL (ref 4.8–10.8)

## 2021-12-16 ENCOUNTER — OFFICE VISIT (OUTPATIENT)
Dept: URGENT CARE | Facility: CLINIC | Age: 26
End: 2021-12-16
Payer: COMMERCIAL

## 2021-12-16 ENCOUNTER — HOSPITAL ENCOUNTER (OUTPATIENT)
Facility: MEDICAL CENTER | Age: 26
End: 2021-12-16
Attending: STUDENT IN AN ORGANIZED HEALTH CARE EDUCATION/TRAINING PROGRAM
Payer: COMMERCIAL

## 2021-12-16 VITALS
BODY MASS INDEX: 25.76 KG/M2 | WEIGHT: 140 LBS | DIASTOLIC BLOOD PRESSURE: 60 MMHG | OXYGEN SATURATION: 96 % | TEMPERATURE: 98.2 F | HEIGHT: 62 IN | HEART RATE: 84 BPM | RESPIRATION RATE: 20 BRPM | SYSTOLIC BLOOD PRESSURE: 104 MMHG

## 2021-12-16 DIAGNOSIS — R53.83 MALAISE AND FATIGUE: ICD-10-CM

## 2021-12-16 DIAGNOSIS — R53.81 MALAISE AND FATIGUE: ICD-10-CM

## 2021-12-16 LAB
EXTERNAL QUALITY CONTROL: NORMAL
FLUAV+FLUBV AG SPEC QL IA: NEGATIVE
INT CON NEG: NEGATIVE
INT CON POS: POSITIVE
SARS-COV+SARS-COV-2 AG RESP QL IA.RAPID: POSITIVE

## 2021-12-16 PROCEDURE — 87804 INFLUENZA ASSAY W/OPTIC: CPT | Performed by: STUDENT IN AN ORGANIZED HEALTH CARE EDUCATION/TRAINING PROGRAM

## 2021-12-16 PROCEDURE — 0240U HCHG SARS-COV-2 COVID-19 NFCT DS RESP RNA 3 TRGT MIC: CPT

## 2021-12-16 PROCEDURE — 99213 OFFICE O/P EST LOW 20 MIN: CPT | Performed by: STUDENT IN AN ORGANIZED HEALTH CARE EDUCATION/TRAINING PROGRAM

## 2021-12-16 PROCEDURE — 87426 SARSCOV CORONAVIRUS AG IA: CPT | Performed by: STUDENT IN AN ORGANIZED HEALTH CARE EDUCATION/TRAINING PROGRAM

## 2021-12-16 ASSESSMENT — ENCOUNTER SYMPTOMS
COUGH: 1
SORE THROAT: 1

## 2021-12-16 NOTE — PROGRESS NOTES
"Subjective     Marilin Sears [facility] is a 26 y.o. 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  Patient's last COVID-19 vaccination was approximately in 2021.  It should be noted that patient also has had Covid infection in the past.  Patient presents to clinic for COVID-19 testing and further evaluation.      Review of Systems   HENT: Positive for congestion and sore throat.    Respiratory: Positive for cough.    All other systems reviewed and are negative.             Objective     /60   Pulse 84   Temp 36.8 °C (98.2 °F) (Temporal)   Resp 20   Ht 1.575 m (5' 2\")   Wt 63.5 kg (140 lb)   SpO2 96%   BMI 25.61 kg/m²      Physical Exam  HENT:      Nose: Congestion and rhinorrhea present.                             Assessment & Plan        1. Malaise and fatigue  26-year-old female presents with symptoms concerning for COVID-19 infection wishes to be tested.  Plan:  1.  Covid rapid test  2.  Covid PCR test  3.  Rapid flu test was negative.  - POCT Influenza A/B  - POCT SARS-COV Antigen URIAH (Symptomatic Only)  - CoV-2 and Flu A/B by PCR (24 hour In-House): Collect NP swab in VTM; Future                "

## 2021-12-17 LAB
FLUAV RNA SPEC QL NAA+PROBE: NEGATIVE
FLUBV RNA SPEC QL NAA+PROBE: NEGATIVE
SARS-COV-2 RNA RESP QL NAA+PROBE: NOTDETECTED
SPECIMEN SOURCE: NORMAL

## 2021-12-22 ENCOUNTER — TELEPHONE (OUTPATIENT)
Dept: OCCUPATIONAL MEDICINE | Facility: CLINIC | Age: 26
End: 2021-12-22

## 2021-12-22 NOTE — TELEPHONE ENCOUNTER
The Patient/employee has been monitored by the employee COVID screening line for a positive COVID test. They have been instructed to remain home from work for the 10 days since the onset of symptoms. If symptoms persist past the 10 days, they are instructed to contact their supervisor.      They are cleared to return to work on 12/24/21 or their next scheduled shift.     A clearance to return to work e-mail has been sent to their leader.

## 2021-12-27 ENCOUNTER — HOSPITAL ENCOUNTER (OUTPATIENT)
Dept: LAB | Facility: MEDICAL CENTER | Age: 26
End: 2021-12-27
Attending: OBSTETRICS & GYNECOLOGY
Payer: COMMERCIAL

## 2021-12-27 LAB
ERYTHROCYTE [DISTWIDTH] IN BLOOD BY AUTOMATED COUNT: 45.2 FL (ref 35.9–50)
HCT VFR BLD AUTO: 38.6 % (ref 37–47)
HGB BLD-MCNC: 12.7 G/DL (ref 12–16)
MCH RBC QN AUTO: 31.4 PG (ref 27–33)
MCHC RBC AUTO-ENTMCNC: 32.9 G/DL (ref 33.6–35)
MCV RBC AUTO: 95.5 FL (ref 81.4–97.8)
PLATELET # BLD AUTO: 184 K/UL (ref 164–446)
PMV BLD AUTO: 11.7 FL (ref 9–12.9)
RBC # BLD AUTO: 4.04 M/UL (ref 4.2–5.4)
WBC # BLD AUTO: 10 K/UL (ref 4.8–10.8)

## 2021-12-27 PROCEDURE — 85027 COMPLETE CBC AUTOMATED: CPT

## 2021-12-27 PROCEDURE — 36415 COLL VENOUS BLD VENIPUNCTURE: CPT

## 2022-01-29 ENCOUNTER — HOSPITAL ENCOUNTER (INPATIENT)
Facility: MEDICAL CENTER | Age: 27
LOS: 1 days | End: 2022-01-30
Attending: OBSTETRICS & GYNECOLOGY | Admitting: OBSTETRICS & GYNECOLOGY
Payer: COMMERCIAL

## 2022-01-29 DIAGNOSIS — R52 POSTPARTUM PAIN: ICD-10-CM

## 2022-01-29 LAB
BASOPHILS # BLD AUTO: 0.2 % (ref 0–1.8)
BASOPHILS # BLD: 0.03 K/UL (ref 0–0.12)
EOSINOPHIL # BLD AUTO: 0.24 K/UL (ref 0–0.51)
EOSINOPHIL NFR BLD: 1.7 % (ref 0–6.9)
ERYTHROCYTE [DISTWIDTH] IN BLOOD BY AUTOMATED COUNT: 43.9 FL (ref 35.9–50)
ERYTHROCYTE [DISTWIDTH] IN BLOOD BY AUTOMATED COUNT: 44.6 FL (ref 35.9–50)
HCT VFR BLD AUTO: 32.7 % (ref 37–47)
HCT VFR BLD AUTO: 39.3 % (ref 37–47)
HGB BLD-MCNC: 11.1 G/DL (ref 12–16)
HGB BLD-MCNC: 13.6 G/DL (ref 12–16)
HOLDING TUBE BB 8507: NORMAL
IMM GRANULOCYTES # BLD AUTO: 0.12 K/UL (ref 0–0.11)
IMM GRANULOCYTES NFR BLD AUTO: 0.8 % (ref 0–0.9)
LYMPHOCYTES # BLD AUTO: 2.68 K/UL (ref 1–4.8)
LYMPHOCYTES NFR BLD: 18.6 % (ref 22–41)
MCH RBC QN AUTO: 32.2 PG (ref 27–33)
MCH RBC QN AUTO: 32.5 PG (ref 27–33)
MCHC RBC AUTO-ENTMCNC: 33.9 G/DL (ref 33.6–35)
MCHC RBC AUTO-ENTMCNC: 34.6 G/DL (ref 33.6–35)
MCV RBC AUTO: 94 FL (ref 81.4–97.8)
MCV RBC AUTO: 94.8 FL (ref 81.4–97.8)
MONOCYTES # BLD AUTO: 0.68 K/UL (ref 0–0.85)
MONOCYTES NFR BLD AUTO: 4.7 % (ref 0–13.4)
NEUTROPHILS # BLD AUTO: 10.64 K/UL (ref 2–7.15)
NEUTROPHILS NFR BLD: 74 % (ref 44–72)
NRBC # BLD AUTO: 0 K/UL
NRBC BLD-RTO: 0 /100 WBC
PLATELET # BLD AUTO: 151 K/UL (ref 164–446)
PLATELET # BLD AUTO: 160 K/UL (ref 164–446)
PMV BLD AUTO: 11.1 FL (ref 9–12.9)
PMV BLD AUTO: 11.3 FL (ref 9–12.9)
RBC # BLD AUTO: 3.45 M/UL (ref 4.2–5.4)
RBC # BLD AUTO: 4.18 M/UL (ref 4.2–5.4)
SARS-COV+SARS-COV-2 AG RESP QL IA.RAPID: NOTDETECTED
SPECIMEN SOURCE: NORMAL
WBC # BLD AUTO: 14.4 K/UL (ref 4.8–10.8)
WBC # BLD AUTO: 19.2 K/UL (ref 4.8–10.8)

## 2022-01-29 PROCEDURE — 36415 COLL VENOUS BLD VENIPUNCTURE: CPT

## 2022-01-29 PROCEDURE — 99999 PR NO CHARGE: CPT | Performed by: OBSTETRICS & GYNECOLOGY

## 2022-01-29 PROCEDURE — 700102 HCHG RX REV CODE 250 W/ 637 OVERRIDE(OP): Performed by: OBSTETRICS & GYNECOLOGY

## 2022-01-29 PROCEDURE — 85025 COMPLETE CBC W/AUTO DIFF WBC: CPT

## 2022-01-29 PROCEDURE — 59409 OBSTETRICAL CARE: CPT

## 2022-01-29 PROCEDURE — 0KQM0ZZ REPAIR PERINEUM MUSCLE, OPEN APPROACH: ICD-10-PCS | Performed by: OBSTETRICS & GYNECOLOGY

## 2022-01-29 PROCEDURE — 85027 COMPLETE CBC AUTOMATED: CPT

## 2022-01-29 PROCEDURE — 700105 HCHG RX REV CODE 258: Performed by: ANESTHESIOLOGY

## 2022-01-29 PROCEDURE — A9270 NON-COVERED ITEM OR SERVICE: HCPCS | Performed by: OBSTETRICS & GYNECOLOGY

## 2022-01-29 PROCEDURE — 700101 HCHG RX REV CODE 250

## 2022-01-29 PROCEDURE — 304965 HCHG RECOVERY SERVICES

## 2022-01-29 PROCEDURE — 700105 HCHG RX REV CODE 258: Performed by: OBSTETRICS & GYNECOLOGY

## 2022-01-29 PROCEDURE — 87426 SARSCOV CORONAVIRUS AG IA: CPT

## 2022-01-29 PROCEDURE — 700111 HCHG RX REV CODE 636 W/ 250 OVERRIDE (IP): Performed by: OBSTETRICS & GYNECOLOGY

## 2022-01-29 PROCEDURE — 770002 HCHG ROOM/CARE - OB PRIVATE (112)

## 2022-01-29 RX ORDER — LIDOCAINE HYDROCHLORIDE 10 MG/ML
INJECTION, SOLUTION INFILTRATION; PERINEURAL
Status: COMPLETED
Start: 2022-01-29 | End: 2022-01-29

## 2022-01-29 RX ORDER — SODIUM CHLORIDE, SODIUM LACTATE, POTASSIUM CHLORIDE, CALCIUM CHLORIDE 600; 310; 30; 20 MG/100ML; MG/100ML; MG/100ML; MG/100ML
INJECTION, SOLUTION INTRAVENOUS CONTINUOUS
Status: DISCONTINUED | OUTPATIENT
Start: 2022-01-29 | End: 2022-01-29

## 2022-01-29 RX ORDER — ONDANSETRON 4 MG/1
4 TABLET, ORALLY DISINTEGRATING ORAL EVERY 6 HOURS PRN
Status: DISCONTINUED | OUTPATIENT
Start: 2022-01-29 | End: 2022-01-29 | Stop reason: HOSPADM

## 2022-01-29 RX ORDER — ROPIVACAINE HYDROCHLORIDE 2 MG/ML
INJECTION, SOLUTION EPIDURAL; INFILTRATION; PERINEURAL CONTINUOUS
Status: DISCONTINUED | OUTPATIENT
Start: 2022-01-29 | End: 2022-01-29

## 2022-01-29 RX ORDER — TERBUTALINE SULFATE 1 MG/ML
0.25 INJECTION, SOLUTION SUBCUTANEOUS
Status: DISCONTINUED | OUTPATIENT
Start: 2022-01-29 | End: 2022-01-29 | Stop reason: HOSPADM

## 2022-01-29 RX ORDER — SODIUM CHLORIDE, SODIUM LACTATE, POTASSIUM CHLORIDE, AND CALCIUM CHLORIDE .6; .31; .03; .02 G/100ML; G/100ML; G/100ML; G/100ML
1000 INJECTION, SOLUTION INTRAVENOUS
Status: COMPLETED | OUTPATIENT
Start: 2022-01-29 | End: 2022-01-29

## 2022-01-29 RX ORDER — ACETAMINOPHEN 500 MG
1000 TABLET ORAL EVERY 6 HOURS PRN
Status: DISCONTINUED | OUTPATIENT
Start: 2022-01-29 | End: 2022-01-30 | Stop reason: HOSPADM

## 2022-01-29 RX ORDER — IBUPROFEN 800 MG/1
800 TABLET ORAL 3 TIMES DAILY PRN
Status: DISCONTINUED | OUTPATIENT
Start: 2022-01-29 | End: 2022-01-30 | Stop reason: HOSPADM

## 2022-01-29 RX ORDER — VITAMIN A ACETATE, BETA CAROTENE, ASCORBIC ACID, CHOLECALCIFEROL, .ALPHA.-TOCOPHEROL ACETATE, DL-, THIAMINE MONONITRATE, RIBOFLAVIN, NIACINAMIDE, PYRIDOXINE HYDROCHLORIDE, FOLIC ACID, CYANOCOBALAMIN, CALCIUM CARBONATE, FERROUS FUMARATE, ZINC OXIDE, CUPRIC OXIDE 3080; 12; 120; 400; 1; 1.84; 3; 20; 22; 920; 25; 200; 27; 10; 2 [IU]/1; UG/1; MG/1; [IU]/1; MG/1; MG/1; MG/1; MG/1; MG/1; [IU]/1; MG/1; MG/1; MG/1; MG/1; MG/1
1 TABLET, FILM COATED ORAL EVERY MORNING
Status: DISCONTINUED | OUTPATIENT
Start: 2022-01-29 | End: 2022-01-30 | Stop reason: HOSPADM

## 2022-01-29 RX ORDER — SODIUM CHLORIDE, SODIUM LACTATE, POTASSIUM CHLORIDE, CALCIUM CHLORIDE 600; 310; 30; 20 MG/100ML; MG/100ML; MG/100ML; MG/100ML
INJECTION, SOLUTION INTRAVENOUS PRN
Status: DISCONTINUED | OUTPATIENT
Start: 2022-01-29 | End: 2022-01-30 | Stop reason: HOSPADM

## 2022-01-29 RX ORDER — MISOPROSTOL 200 UG/1
800 TABLET ORAL
Status: DISCONTINUED | OUTPATIENT
Start: 2022-01-29 | End: 2022-01-29 | Stop reason: HOSPADM

## 2022-01-29 RX ORDER — DOCUSATE SODIUM 100 MG/1
100 CAPSULE, LIQUID FILLED ORAL 2 TIMES DAILY
Status: DISCONTINUED | OUTPATIENT
Start: 2022-01-29 | End: 2022-01-30 | Stop reason: HOSPADM

## 2022-01-29 RX ORDER — ONDANSETRON 2 MG/ML
4 INJECTION INTRAMUSCULAR; INTRAVENOUS EVERY 6 HOURS PRN
Status: DISCONTINUED | OUTPATIENT
Start: 2022-01-29 | End: 2022-01-29 | Stop reason: HOSPADM

## 2022-01-29 RX ORDER — SODIUM CHLORIDE, SODIUM LACTATE, POTASSIUM CHLORIDE, AND CALCIUM CHLORIDE .6; .31; .03; .02 G/100ML; G/100ML; G/100ML; G/100ML
250 INJECTION, SOLUTION INTRAVENOUS PRN
Status: DISCONTINUED | OUTPATIENT
Start: 2022-01-29 | End: 2022-01-29 | Stop reason: HOSPADM

## 2022-01-29 RX ORDER — MISOPROSTOL 200 UG/1
800 TABLET ORAL
Status: DISCONTINUED | OUTPATIENT
Start: 2022-01-29 | End: 2022-01-30 | Stop reason: HOSPADM

## 2022-01-29 RX ORDER — OXYCODONE HYDROCHLORIDE 5 MG/1
5 TABLET ORAL EVERY 4 HOURS PRN
Status: DISCONTINUED | OUTPATIENT
Start: 2022-01-29 | End: 2022-01-30 | Stop reason: HOSPADM

## 2022-01-29 RX ORDER — ROPIVACAINE HYDROCHLORIDE 2 MG/ML
INJECTION, SOLUTION EPIDURAL; INFILTRATION; PERINEURAL
Status: ACTIVE
Start: 2022-01-29 | End: 2022-01-29

## 2022-01-29 RX ORDER — OXYTOCIN 10 [USP'U]/ML
10 INJECTION, SOLUTION INTRAMUSCULAR; INTRAVENOUS
Status: DISCONTINUED | OUTPATIENT
Start: 2022-01-29 | End: 2022-01-29 | Stop reason: HOSPADM

## 2022-01-29 RX ADMIN — SODIUM CHLORIDE, POTASSIUM CHLORIDE, SODIUM LACTATE AND CALCIUM CHLORIDE 1000 ML: 600; 310; 30; 20 INJECTION, SOLUTION INTRAVENOUS at 09:27

## 2022-01-29 RX ADMIN — DOCUSATE SODIUM 100 MG: 100 CAPSULE ORAL at 17:48

## 2022-01-29 RX ADMIN — ONDANSETRON 4 MG: 2 INJECTION INTRAMUSCULAR; INTRAVENOUS at 09:37

## 2022-01-29 RX ADMIN — OXYCODONE HYDROCHLORIDE 5 MG: 5 TABLET ORAL at 20:32

## 2022-01-29 RX ADMIN — ACETAMINOPHEN 1000 MG: 500 TABLET ORAL at 16:34

## 2022-01-29 RX ADMIN — FENTANYL CITRATE 100 MCG: 50 INJECTION, SOLUTION INTRAMUSCULAR; INTRAVENOUS at 10:44

## 2022-01-29 RX ADMIN — LIDOCAINE HYDROCHLORIDE: 10 INJECTION, SOLUTION INFILTRATION; PERINEURAL at 10:45

## 2022-01-29 RX ADMIN — OXYCODONE HYDROCHLORIDE 5 MG: 5 TABLET ORAL at 16:34

## 2022-01-29 RX ADMIN — OXYTOCIN 2000 ML/HR: 10 INJECTION, SOLUTION INTRAMUSCULAR; INTRAVENOUS at 10:42

## 2022-01-29 RX ADMIN — IBUPROFEN 800 MG: 800 TABLET, FILM COATED ORAL at 12:39

## 2022-01-29 RX ADMIN — IBUPROFEN 800 MG: 800 TABLET, FILM COATED ORAL at 20:32

## 2022-01-29 RX ADMIN — FENTANYL CITRATE 100 MCG: 50 INJECTION, SOLUTION INTRAMUSCULAR; INTRAVENOUS at 09:15

## 2022-01-29 RX ADMIN — OXYTOCIN 125 ML/HR: 10 INJECTION, SOLUTION INTRAMUSCULAR; INTRAVENOUS at 12:11

## 2022-01-29 RX ADMIN — SODIUM CHLORIDE, POTASSIUM CHLORIDE, SODIUM LACTATE AND CALCIUM CHLORIDE: 600; 310; 30; 20 INJECTION, SOLUTION INTRAVENOUS at 09:00

## 2022-01-29 ASSESSMENT — PAIN DESCRIPTION - PAIN TYPE
TYPE: ACUTE PAIN
TYPE: ACUTE PAIN

## 2022-01-29 ASSESSMENT — PATIENT HEALTH QUESTIONNAIRE - PHQ9
2. FEELING DOWN, DEPRESSED, IRRITABLE, OR HOPELESS: NOT AT ALL
SUM OF ALL RESPONSES TO PHQ9 QUESTIONS 1 AND 2: 0
1. LITTLE INTEREST OR PLEASURE IN DOING THINGS: NOT AT ALL

## 2022-01-29 ASSESSMENT — LIFESTYLE VARIABLES: EVER_SMOKED: NEVER

## 2022-01-29 NOTE — PROGRESS NOTES
Pt presents to L&D with complaints of SROM. Pt taken to S214 for assessment.     0836 TOCO and EFM applied, VSS. Pt reports +FM, LOF and some spotting. Pt states she started renetta around 0330 this morning. Pt drove to the hospital but stayed in her vehicle unsure of if its time to come in. While waiting, pt had a gush of clear fluid that has continued to trickle. Upon examination, moderate amount of clear fluid noted with some spotting. SVE 4/80/-2. RN will update Dr. Corey on pt status.      0850 Dr. Corey updated on pt status, orders for admission received.     7773 Report given to Marleni BURR, POC discussed.

## 2022-01-29 NOTE — H&P
"Labor and Delivery History and Physical    CC: Contractions, water broke    HPI: 25yo G1 at 38w6d, EDC 2/6/22. Pt presents to L&D with complaint of contractions and water broke this morning. She denies vaginal bleeding. Baby moving well. Prenatal care uncomplicated with Dr. Hurtado.    All other systems were reviewed and were negative except as stated above.    PMH: healthy female    PSH: sinus surgeries    OB HX: first pregnancy    Gyn Hx: no abnl paps, no STIs    SH: no T/E/D, , RN in NICU here at [facility]    FH: non-contributory    Meds: PNV    Allergies: tetracycline    /77   Pulse 64   Temp 36.4 °C (97.6 °F) (Temporal)   Resp 20   Ht 1.575 m (5' 2\")   Wt 73 kg (161 lb)   SpO2 99%   BMI 29.45 kg/m²     Physical Exam  Gen: NAD  Abd: soft, gravid, non tender, no rebound or guarding  EFW: 7 pounds by [place]  Ext: no edema, nontender    FHT: category 1  [place]: 2-4 minutes  SVE: 4/80/-2 per RN exam and grossly ruptured    Laboratory Evaluation  ABO: O+/-  H/H: 12.7/38.6  GBS: negative  GTT: 125  Rubella: Immune  HIV: Neg  RPR: NR  HepBsAg: neg  Pap: NILM      Assessment:   25yo G1 at 38w6d  Early active labor  SROM  GBS neg    Plan:  Admit to L&D  Fetal monitoring and toco  IV fluids  Pain meds prn  Anticipate vaginal delivery      Anyi Wesley M.D.    "

## 2022-01-29 NOTE — PROGRESS NOTES
0982-report received from Janee BURR, care assumed, POC discussed, bolusing for epidural, anesthesia in OR  1002-pt feeling uncontrolable urge to push, SVE complete/+1, room set up for delivery, Dr Corey called for delivery  1015-started pushing, FHR down to 80's until delivery, Dr Corey in room at this time  1030- viable female per Dr Corey, 8/9 APGARs  1042-placenta  1345-up to BR, no void, pericare done, pads placed, new gown  1420-transfer to , report given to Asya BURR, POC discussed

## 2022-01-29 NOTE — L&D DELIVERY NOTE
Vaginal Delivery Procedure Note:    Marilin Buck is a 26 y.o. , now Para 1001     Weeks of gestation: 38w6d  Diagnosis: Active labor, SROM, GBS neg     of viable female infant over intact perineum at 1030. Vertex, OA. Nuchal cord not present. Anterior and posterior shoulders delivered with ease. Nose and mouth suctioned with bulb. Infant placed on maternal abdomen. Delayed cord clamp performed. Cord then clamped and cut by FOB.  APGARs 8 and 9  Birth weight - pending  Placenta delivered spontaneously intact in [facility] at 1042. 3 Vessel cord. Edge of placenta examined and approx 5% abruption visualized.  Laceration: 2nd degree perineal laceration. Injected with 1% lidocaine w/out epinephrine. Repaired laceration with 3-0 vicryl suture in usual running fashion.  Excellent hemostasis.   mL  Anesthesia - 1% lidocaine only.  Sponge and needle counts correct.  Patient tolerated procedure well. Mother and baby bonding skin to skin.

## 2022-01-30 ENCOUNTER — PHARMACY VISIT (OUTPATIENT)
Dept: PHARMACY | Facility: MEDICAL CENTER | Age: 27
End: 2022-01-30
Payer: COMMERCIAL

## 2022-01-30 VITALS
RESPIRATION RATE: 18 BRPM | HEART RATE: 78 BPM | HEIGHT: 62 IN | BODY MASS INDEX: 29.63 KG/M2 | WEIGHT: 161 LBS | DIASTOLIC BLOOD PRESSURE: 80 MMHG | OXYGEN SATURATION: 97 % | SYSTOLIC BLOOD PRESSURE: 120 MMHG | TEMPERATURE: 98.6 F

## 2022-01-30 PROBLEM — H00.019 STYE: Status: RESOLVED | Noted: 2018-04-11 | Resolved: 2022-01-30

## 2022-01-30 PROBLEM — Z00.00 HEALTH CARE MAINTENANCE: Status: RESOLVED | Noted: 2017-10-19 | Resolved: 2022-01-30

## 2022-01-30 PROCEDURE — 700102 HCHG RX REV CODE 250 W/ 637 OVERRIDE(OP): Performed by: OBSTETRICS & GYNECOLOGY

## 2022-01-30 PROCEDURE — A9270 NON-COVERED ITEM OR SERVICE: HCPCS | Performed by: OBSTETRICS & GYNECOLOGY

## 2022-01-30 PROCEDURE — RXMED WILLOW AMBULATORY MEDICATION CHARGE: Performed by: OBSTETRICS & GYNECOLOGY

## 2022-01-30 RX ORDER — IBUPROFEN 800 MG/1
800 TABLET ORAL 3 TIMES DAILY PRN
Qty: 30 TABLET | Refills: 1 | Status: SHIPPED | OUTPATIENT
Start: 2022-01-30 | End: 2022-05-25

## 2022-01-30 RX ORDER — OXYCODONE HYDROCHLORIDE 5 MG/1
5 TABLET ORAL EVERY 4 HOURS PRN
Qty: 10 TABLET | Refills: 0 | Status: SHIPPED | OUTPATIENT
Start: 2022-01-30 | End: 2022-02-04

## 2022-01-30 RX ORDER — PSEUDOEPHEDRINE HCL 30 MG
100 TABLET ORAL 2 TIMES DAILY
Qty: 30 CAPSULE | Refills: 1 | Status: SHIPPED | OUTPATIENT
Start: 2022-01-30 | End: 2022-05-25

## 2022-01-30 RX ADMIN — OXYCODONE HYDROCHLORIDE 5 MG: 5 TABLET ORAL at 02:02

## 2022-01-30 RX ADMIN — DOCUSATE SODIUM 100 MG: 100 CAPSULE ORAL at 11:14

## 2022-01-30 RX ADMIN — IBUPROFEN 800 MG: 800 TABLET, FILM COATED ORAL at 08:01

## 2022-01-30 RX ADMIN — ACETAMINOPHEN 1000 MG: 500 TABLET ORAL at 02:02

## 2022-01-30 RX ADMIN — OXYCODONE HYDROCHLORIDE 5 MG: 5 TABLET ORAL at 11:17

## 2022-01-30 RX ADMIN — PRENATAL WITH FERROUS FUM AND FOLIC ACID 1 TABLET: 3080; 920; 120; 400; 22; 1.84; 3; 20; 10; 1; 12; 200; 27; 25; 2 TABLET ORAL at 11:17

## 2022-01-30 RX ADMIN — ACETAMINOPHEN 1000 MG: 500 TABLET ORAL at 08:01

## 2022-01-30 ASSESSMENT — EDINBURGH POSTNATAL DEPRESSION SCALE (EPDS)
I HAVE LOOKED FORWARD WITH ENJOYMENT TO THINGS: AS MUCH AS I EVER DID
I HAVE BEEN SO UNHAPPY THAT I HAVE BEEN CRYING: NO, NEVER
I HAVE BEEN SO UNHAPPY THAT I HAVE HAD DIFFICULTY SLEEPING: NOT AT ALL
I HAVE BLAMED MYSELF UNNECESSARILY WHEN THINGS WENT WRONG: NOT VERY OFTEN
I HAVE FELT SAD OR MISERABLE: NO, NOT AT ALL
THE THOUGHT OF HARMING MYSELF HAS OCCURRED TO ME: NEVER
I HAVE BEEN ANXIOUS OR WORRIED FOR NO GOOD REASON: NO, NOT AT ALL
THINGS HAVE BEEN GETTING ON TOP OF ME: NO, I HAVE BEEN COPING AS WELL AS EVER
I HAVE BEEN ABLE TO LAUGH AND SEE THE FUNNY SIDE OF THINGS: AS MUCH AS I ALWAYS COULD
I HAVE FELT SCARED OR PANICKY FOR NO GOOD REASON: NO, NOT MUCH

## 2022-01-30 ASSESSMENT — PAIN DESCRIPTION - PAIN TYPE: TYPE: ACUTE PAIN

## 2022-01-30 NOTE — LACTATION NOTE
This note was copied from a baby's chart.  Met with amee Edwards's parents today to answer questions and observe a feeding.Reinforced basic positioning, latch and feeding cues with mother. Parents had Encouraged mother to keep baby skin to skin and observe for feeding cues, while taking into consideration other normal bodily functions that may prohibit baby wanting to latch or feed at times. Advocated not timing the feedings or use strict schedules as these can interfere with milk production and baby may not get enough calories. Parents were provided information about out-patient services

## 2022-01-30 NOTE — PROGRESS NOTES
Assessment done vital signs stable. Patient progressing according to plan of care. Fundus firm at the umbilicus with light lochia. Patient up voiding without difficulty. Ambulating with steady gait. Breast feeding infant on demand. Family at bedside. Patient denies problems at this time. Patient claims she will call for any needs or medications

## 2022-01-30 NOTE — DISCHARGE PLANNING
Meds-to-Beds: Discharge prescription orders listed below delivered to patient's bedside. RN notified. Patient declined counseling.    Medications  docusate sodium 100 MG Cap  Take 1 capsule by mouth 2 times a day.  Disp-30 Capsule, R-1    ibuprofen (MOTRIN) 800 MG Tab  Take 1 Tablet by mouth 3 times a day as needed.  Disp-30 Tablet, R-1    oxyCODONE immediate-release (ROXICODONE) 5 MG Tab  Take 1 Tablet by mouth every four hours as needed for up to 5 days.  Disp-10 Tablet, R-0    Ashlyn Pfeiffer, Pharmacy Intern

## 2022-01-30 NOTE — DISCHARGE SUMMARY
"Discharge Summary    Admission Date: 22    Discharge Date: 22    Diagnosis:  IUP at 38w6d  SROM  Active labor   GBS neg    Procedures:      Subjective: Pt doing well. Pain controlled. Normal lochia. Eating, voiding and ambulating without difficulty. Baby doing well. Breast feeding. Desires discharge home today.    /80   Pulse 78   Temp 37 °C (98.6 °F) (Temporal)   Resp 18   Ht 1.575 m (5' 2\")   Wt 73 kg (161 lb)   SpO2 97%   Breastfeeding Yes   BMI 29.45 kg/m²     GEN: NAD, comfortable  GI: soft, NT, ND  Gu: fundus firm, nontender, and below umbilicus  EXT: nontender, no edema    Hospital Course: The patient presented to L&D with contractions and reports of her water broke at 4cm dilation. She was admitted and rapidly progressed through labor to complete dilation. She desired epidural anesthesia however she progressed too rapidly to receive one. The patient had an un-medicated  w/out complications. She delivered a healthy baby girl, weight 5lb 10oz, Apgars 8 and 9. She had a 2nd degree perineal laceration which was repaired. Postpartum course was uncomplicated. Patient and baby were discharged to home    Discharge Instructions   1. Diet : general  2. Activity: Pelvic rest for 6 weeks    Rx: Oxycodone and Ibuprofen prn pain, OTC Tylenol prn pain, Cont PNV daily     Follow up: 6 weeks postpartum with Dr. Hurtado    Complications: none      Anyi Wesley M.D.      "

## 2022-01-30 NOTE — PROGRESS NOTES
Pt arrived to S326 via wheelchair with L&D RN. Report received from Marleni. Assessment completed. Pt states mild abdominal cramping, knows to call if pain meds needed. Pt oriented to room, call light, infant security, emergency light, and unit routine. Encouraged to call for assistance.

## 2022-01-30 NOTE — CARE PLAN
The patient is Stable - Low risk of patient condition declining or worsening         Progress made toward(s) clinical / shift goals:  VS WNL      Problem: Knowledge Deficit - Postpartum  Goal: Patient will verbalize and demonstrate understanding of self and infant care  1/29/2022 1803 by Asya Nguyễn R.WINDY.  Outcome: Progressing  1/29/2022 1801 by ANA GaleN.  Outcome: Progressing     Problem: Psychosocial - Postpartum  Goal: Patient will verbalize and demonstrate effective bonding and parenting behavior  1/29/2022 1803 by ANA GaleN.  Outcome: Progressing  1/29/2022 1801 by ANA GaleN.  Outcome: Progressing     Problem: Altered Physiologic Condition  Goal: Patient physiologically stable as evidenced by normal lochia, palpable uterine involution and vitals within normal limits  1/29/2022 1803 by Asya Nguyễn R.N.  Outcome: Progressing  1/29/2022 1801 by Asya Nguyễn R.N.  Outcome: Not Progressing     Problem: Infection - Postpartum  Goal: Postpartum patient will be free of signs and symptoms of infection  1/29/2022 1803 by Asya Nguyễn R.N.  Outcome: Progressing  1/29/2022 1801 by ANA GaleN.  Outcome: Progressing

## 2022-01-30 NOTE — CARE PLAN
The patient is Stable - Low risk of patient condition declining or worsening    Shift Goals  Clinical Goals: Pain control and bleeding WNL  Patient Goals: Breastfeed   Family Goals: Bond and rest    Progress made toward(s) clinical / shift goals:    Problem: Pain - Standard  Goal: Alleviation of pain or a reduction in pain to the patient’s comfort goal  Outcome: Progressing  Note: Pain controlled with motrin, tylenol and Roxicodone. Pt ambulating to bathroom.      Problem: Psychosocial - Postpartum  Goal: Patient will verbalize and demonstrate effective bonding and parenting behavior  Outcome: Progressing  Note: Patient breastfeeding infant and started supplementing.        Patient is not progressing towards the following goals: n/a

## 2022-01-30 NOTE — PROGRESS NOTES
1915- Bedside report received from LENO Sky.  Infant breastfeeding.  Latch assessed.  MOB denies pain with latch.      2030- Infant assessed and weighed.  Discussed POC with parents.  Will continue to monitor.

## 2022-01-30 NOTE — DISCHARGE INSTRUCTIONS
PATIENT DISCHARGE EDUCATION INSTRUCTION SHEET  REASONS TO CALL YOUR OBSTETRICIAN  · Persistent fever, shaking, chills (Temperature higher than 100.4) may indicate you have an infection  · Heavy bleeding: soaking more than 1 pad per hour; Passing clots an egg-sized clot or bigger may mean you have an postpartum hemorrhage  · Foul odor from vagina or bad smelling or discolored discharge or blood  · Breast infection (Mastitis symptoms); breast pain, chills, fever, redness or red streaks, may feel flu like symptoms  · Urinary pain, burning or frequency  · Incision that is not healing, increased redness, swelling, tenderness or pain, or any pus from episiotomy or  site may mean you have an infection  · Redness, swelling, warmth, or painful to touch in the calf area of your leg may mean you have a blood clot  · Severe or intensified depression, thoughts or feelings of wanting to hurt yourself or someone else   · Pain in chest, obstructed breathing or shortness of breath (trouble catching your breath) may mean you are having a postpartum complication. Call your provider immediately   · Headache that does not get better, even after taking medicine, a bad headache with vision changes or pain in the upper right area of your belly may mean you have high blood pressure or post birth preeclampsia. Call your provider immediately    HAND WASHING  All family and friends should wash their hands:  · Before and after holding the baby  · Before feeding the baby  · After using the restroom or changing the baby's diaper    WOUND CARE  Ask your physician for additional care instructions. In general:  ·  Incision:  · May shower and pat incision dry   · Keep the incision clean and dry  · There should not be any opening or pus from the incision  · Continue to walk at home 3 times a day   · Do NOT lift anything heavier than your baby (over 10 pounds)  · Encourage family to help participate in care of the  to allow  rest and mom time to heal  · Episiotomy/Laceration  · May use carolina-spray bottle, witch hazel pads and dermaplast spray for comfort  · Use carolina-spray bottle after urinating to cleanse perineal area  · To prevent burning during urination spray carolina-water bottle on labial area   · Pat perineal area dry until episiotomy/laceration is healed  · Continue to use carolina-bottle until bleeding stops as needed  · If have a 2nd degree laceration or greater, a Sitz bath can offer relief from soreness, burning, and inflammation   · Sitz Bath   · Sit in 6 inches of warm water and soak laceration as needed until the laceration heals    VAGINAL CARE AND BLEEDING  · Nothing inside vagina for 6 weeks:   · No sexual intercourse, tampons or douching  · Bleeding may continue for 2-4 weeks. Amount and color may vary  · Soaking 1 pad or more in an hour for several hours is considered heavy bleeding  · Passing large egg sized blood clots can be concerning  · If you feel like you have heavy bleeding or are having increasing amount of blood clots call your Obstetrician immediately  · If you begin feeling faint upon standing, feeling sick to your stomach, have clammy skin, a really fast heartbeat, have chills, start feeling confused, dizzy, sleepy or weak, or feeling like you're going to faint call your Obstetrician immediately    HYPERTENSION   Preeclampsia or gestational hypertension are types of high blood pressure that only pregnant women can get. It is important for you to be aware of symptoms to seek early intervention and treatment. If you have any of these symptoms immediately call your Obstetrician    · Vision changes or blurred vision   · Severe headache or pain that is unrelieved with medication and will not go away  · Persistent pain in upper abdomen or shoulder   · Increased swelling of face, feet, or hands  · Difficulty breathing or shortness of breath at rest  · Urinating less than usual    URINATION AND BOWEL MOVEMENTS  · Eating  "more fiber (bran cereal, fruits, and vegetables) and drinking plenty of fluids will help to avoid constipation  · Urinary frequency and urgency after childbirth is normal  · If you experience any urinary pain, burning or frequency call your provider    BIRTH CONTROL  · It is possible to become pregnant at any time after delivery and while breastfeeding  · Plan to discuss a method of birth control with your physician at your post delivery follow up visit    POSTPARTUM BLUES  During the first few days after birth, you may experience a sense of the \"blues\" which may include impatience, irritability or even crying. These feelings come and go quickly. However, as many as 1 in 10 women experience emotional symptoms known as postpartum depression.     POSTPARTUM DEPRESSION    May start as early as the second or third day after delivery or take several weeks or months to develop. Symptoms of \"blues\" are present, but are more intense: Crying spells; loss of appetite; feelings of hopelessness or loss of control; fear of touching the baby; over concern or no concern at all about the baby; little or no concern about your own appearance/caring for yourself; and/or inability to sleep or excessive sleeping. Contact your Obstetrician if you are experiencing any of these symptoms     PREVENTING SHAKEN BABY  If you are angry or stressed, PUT THE BABY IN THE CRIB, step away, take some deep breaths, and wait until you are calm to care for the baby. DO NOT SHAKE THE BABY. You are not alone, call a supporter for help.  · [facility] 24/7 crisis call line (352-578-2936) or (1-896.697.8579)  · You can also text them, text \"ANSWER\" (216527)      "

## 2022-02-02 ENCOUNTER — NON-PROVIDER VISIT (OUTPATIENT)
Dept: OBGYN | Facility: CLINIC | Age: 27
End: 2022-02-02
Payer: COMMERCIAL

## 2022-02-02 NOTE — PROGRESS NOTES
Summary: Marilin is doing an excellent job establishing her milk supply on day 4. She was breastfeeding, started small volumes of supplement due to small baby and wanting to prevent nursery or NICU stay. Over the past day she has been breastfeeding every 2.5-3 hours throughout the day and night. Offering both breast, 10-20 minutes on each side. Topping off with 15-20mls after the breast. Started pumping after feedings yesterday. Pumped and bottle fed throughout the night, yielding and average of 15mls on each side.   Today: Latched to the right breast, cross cradle, transferring 28mls in 11 minutes. Attempted to latch to the left breast, several times, without success. Baby was awake and not interested. Pumped with Spectra S1, yielding 55mls between both breast in 15 minutes. Applied vibration to the lump in the left armpit to help reduce swelling and discomfort.   Plan: Breastfeed every 1.5-2.5 hours throughout the day. Offer both breast, up to 10-15 minutes on each side. If still showing hunger cues or if only taking one side, offer 10-20mls of expressed breastmilk. If not breastfeeding, offer 35-40mls. This amount will increase daily, see guidelines below. Ok to pump and bottle feed throughout the night. Use a warm compress and vibration on lump and breast as needed.   Follow up:   Lactation appointment: 2022  Pediatrician appointment: Thursday, February 3, 2022  Referrals: None    Subjective:     Marilin Buck is a 26 y.o. female here for lactation care. She is here today with  (Daniel) and baby, Norma.    Concerns: breast pain, supplementing due to weight loss and lump in left armpit       HPI:   Pertinent  history:   Mother does not have a history of advanced maternal age, GDM, hypertension prior to pregnancy, insulin resistance, multiple gestation, PCOS and thyroid disease. Common condition(s) which may interfere with milk supply.      Breast changes in pregnancy:  Yes  History of breast surgeries: No      FEEDING HISTORY:    Past breastfeeding history: First baby   Hospital course: Breastfeeding, started small volumes of supplement due to small baby and wanting to prevent nursery or NICU stay.   Currently 2/2/2022: Breastfeeding every 2.5-3 hours throughout the day and night. Offering both breast, 10-20 minutes on each side. Topping off with 15-20mls after the breast. Started pumping after feedings yesterday. Pumped and bottle fed throughout the night, yielding and average of 15mls on each side.     Both breasts: Yes  Bottle feeds: 6-8/24h    Supplement: Supplementation initiated inpatient, Expressed breast milk and Formula  Quantity: 15-20mls  How given/devices:  Bottle    Nipple Shield Use: None      Breast Pumping:   Frequency: After or in place of feeding  Type of pump: Spectra S1  Flange size/type: 24mm  NO pain with pumping    Maternal ROS:  Constitutional: No fever, chills. Feeling well  Breasts: Soreness of breasts and Soreness of nipples with initial latch  Psychiatric: Feels exhausted and Managing ok  Mental Health: No mention of feeling irritable, agitated, angry, overwhelmed, apathy, exhaustion nor having sleep changes outside infant feeds/demands or appetite changes     Objective:     Maternal Physical   General: No acute distress  Breasts: Symetrical , Soft, Plugged Duct - no evidence and Mastitis  - no S/S, small lump in left armpit  Nipples: intact  Psychiatric: Normal mood and affect. Her behavior is normal. Judgment and thought content normal   Mental Health: Did NOT exhibit sadness, crying, feeling overwhelmed, agitation or hypervigilance.     Assessment/Plan & Lactation Counseling:     Infant exam on infant chart    Infant Weight History:   01/29/2022: 5# 10.3oz  02/01/2022: 5# 2oz (Ped)  02/02/2022: 5# 2.4oz    Infant intake at Breast:  R  28mls     L Unable to [place]    Total: 28mls  Milk Transfer at this feeding:   Effective breastfeeding right side  only  Pumped: Type of Pump: Spectra    Quantity Pumped: L 30mls    R 25mls     Total: 55mls  Initiation of Feeding: Infant initiates and Unable to rouse for left breast  Position of Feeding:    Right: cross cradle  Left: cross cradle  Attachment Achieved: rapidly  Nipple shield: N/A   Suck Pattern at the breast: Suck burst and normal rest  Suck Pattern on the bottle: N/A  Behavior Following Observed Feeding: content  Nipple Pain: None today, typically experience discomfort with initial latch  Nipple Pain from: Shallow latch, mother works to correct     Latch: Mom latches independently and Assisted latch for depth and symmetry   Suckling/Feeding: attaches, baby falls asleep, baby fed effectively, baby roots, elicits BRIAN, intermittent swallows and rhythmic  Milk Supply Available: normal, establishing well day 4    BREASTFEEDING PLAN  Discussed concerns and symptoms as listed above in assessment and guidance summarized below.  Shared decision making was used between myself and the family for this encounter, home care, and follow up.  Topics reviewed included:  • Herbs and medications  A galactagogue is an herb or medication taken by a breastfeeding mother to increase her milk supply. We know that for centuries mothers around the world have sought out remedies to increase their milk supply. However, there is limited research on the safety and effectiveness of herbal galactagogues, which makes it hard for us to endorse them. It is not known if any of these herbs are truly effective, and it is difficult to predict how a specific herbal galactagogue will affect an individual, requiring “trial and error” in many situations. When effective, results are generally seen within 24-72 hours of starting an herbal galactagogue. Many of these herbs are used to decrease high blood sugar. If you are diabetic or have problems with low blood sugar, or thyroid disease  discuss the use of an herbal galactagogue with your health care  provider. Not all women can increase their low milk supply with a galactagogue due to the many underlying causes of low milk production.  When taking a galactagogue, remember that frequent milk removal is still the most effective way to increase supply.    • Maternal Mental Health: Having a new baby can be joyful time for some new moms, but a difficult time for others. For all, it is a time of profound physical and emotional change. Balancing baby, family, partners and friends, work, pets, and preexisting or new life stressors as well as sleep deprivation can be extremely challenging. Untreated depression and anxiety can contribute to early cessation of breastfeeding, so it is important both for the mental health and physical health of new moms and babies to get on the path to feeling better.   • Self -care through relational support and interaction.   o Encouraged  support, rest, getting out of the house each day, walk or drive somewhere,  a coffee/tea at a drive through  o Allow others to bring you food, help with chores and errands, meeting for a cup of coffee  • Milk supply is dependent on glandular tissue development, hormonal influences, how many times the baby removes milk and how well the breasts are emptied in a 24 hour period. This is a biological reality that we can NOT work around. If, for any reason, your baby is not latching, or you are not able to nurse, then it is important for you to remove the milk instead by pumping or hand expression.  There's no magic trick, tea, food, drink, cookie or supplement that will increase your milk supply. One  must  effectively remove milk to continue to make and maximize milk. In the early days and weeks that can be 8+ times in 24 hours. For older babies, on average 6-7 + times in 24 hours.    • Feeding:   o Feed your baby every 1.5-2.5 hours, more often if baby acts hungry.   o Awaken baby for feeding if going over 2.5 hours in the day.   o Until back to  birth weight, ONE four hour at night is acceptable if has had 8 prior feedings in 24 hours.    o Daily goal: 8-10 feedings per 24 hours.   • Supplement:   o Supplement with expressed milk  - If breastfeeding and still showing hunger cues, offer 10-20mls  - If not breastfeeding, see guidelines  • Wednesday: 35-40mls   • Thursday: 40-45mls  • Friday: 45-50mls  • Saturday-Monday: 60mls  o Supplement with formula if breastmilk is not available  • When bottle-feeding, there are three primary things to consider:    o Nipple Shape:  - Look for a nipple that looks like a “breast at work” not a “breast at rest.”  A “breast at work” has a somewhat cone shape as the nipple and breast tissue is pulled into the baby’s mouth while feeding.  Your baby’s mouth should be able to go around the widest part of the nipple to form a wide-open gape on the bottle like that of a good latch at the breast. In contrast, a breast at rest might look more like, well, a breast: a roundish base with a  nipple.  If the bottle looks like this, your baby’s lips may not be able to get around the widest part of the nipple because it is just too wide resulting in a narrow gape that would hurt your nipple. This nipple shape may also make it difficult for your baby to make a complete seal with their lips which leads to air intake and milk spillage.Wide or narrow neck bottles are your choice.   o Flow Rate of the Nipple:  - The nipple flow should be slow.  Don’t just read the label, but notice how your baby is feeding and trust what you observe.  A study done a few years ago found that “slow flow” varied widely between brands and even between nipples of the same brand.  Try several nipples until you find one that results in a rhythmic sucking pattern but not chugging and gulping.  o Pacing the feeding:  - A slow flow nipple helps, but how you feed the baby is more important.  Good positioning can compensate for a faster flow nipple.  When bottle-feeding,  "the baby should control how much is consumed at a feeding.  Holding the baby in an upright position with the bottle horizontal ensures that the baby gets milk only when sucking.  Here is a nice video demonstrating this concept of paced bottle feeding,  https://www.youtube.com/watch?v=YxKVW7kCC0T  • Pacifier Use:  The American [employer]ians' Position Paper reports: Although we recommend a conservative approach regarding pacifier use, we do not endorse a complete ban on the use of pacifiers, nor do we support an approach that induces parental guilt concerning their choices about the use of pacifiers.  • Positioning Techniques   o Suggested positions Cross cradle  o Fine tune position by making sure your fingers beneath the breast as well as your bra, are out of the way of your baby's chin.  o Positioning:  Many positions shown, great sidelying at 7 minutes.   o See http://globalhealthmedia.org/portfolio-items/positions-for-breastfeeding/?rmrjhexbeHV=53027  • Latch on Techniques   o Fine tune latch:  - By holding your baby more securely at the breast, assisting your baby to stay attached by:  • Bringing your baby to your breast, not breast to the baby  • Your baby's cheek to touch breast securely, nose tipped back  • Hold your baby firmly in place so when your baby forgets to suck and picks it back up again your baby is in the correct spot. You will be extinguishing behavior and replacing it with a deeper latch to stimulate suck and provide satisfaction at the breast.  • Your baby needs as much breast as deep in the mouth as possible to allow your nipples to heal and for you more importantly to maximize efficiency at the breast  o Latch is asymmetrical, leading with the chin, getting the baby below the breast, as if offering a large \"deli elodia sandwich\".  • Pumping Guidelines:  o Both breasts   o Pump 6-8 times in 24 hours  - After or in place of most feedings   o Type of pump:  • Spectra   • Spectra " Settings  o Press letdown button when first starting         - Cycle: 70 / Vacuum: L1 - L 5 (To comfort)  o Once milk lets down, press letdown button again  - Cycle: 54 / Vacuum: L1 - L12 (To comfort)  o Always double pump  o How long will vary woman to woman, typically 8-15 minutes, or 1-2 minutes after flow slows  o Flange Fit: Freely moving nipple in the tunnel with some movement of the areola.  - Today's evaluation indicates appropriate flange    • Storage (Acceptable guidelines for healthy term babies)  o 10 hours at room temp including your pieces, may rinse but not mandatory  o 8 days refrigerator, don't need  to refrigerate right away if using fresh pumped milk at the next feeding  o Freezer 1 year  o Deep freezer 2 years  o American Academy or Pediatrics has said you may mix different temperature milks.   o If baby drinks breastmilk from a fresh or refrigerated bottle of breastmilk,  you may return the unused portion to the refrigerator and use once at next feeding.     • Nipple care:  o May apply breastmilk  o Moist-oily ointment after feeding/pumping, ie Lanolin nipple butter, coconut or olive oil, if desired/needed 2-3 times/day until nipples are healed  o Lanolin contraindicated with wool sensitivity  o You do not need to wash this off before pumping or feeding the baby  o You may want to remove baby from breast when active swallowing stops to avoid prolonged nipple compression      • Connect with other mothers:  o Facebook:   • [place] [facility]: https://[email or web address]/[place].breastfeeds/  • Well-Nourished Babies (Private group for questions and support): https://www.facebook.com/groups/125161947097435/  o Breastfeeding [place] [facility]  • Tuesdays 10am - 11am. Women's Health at [address], 3rd floor conference room  - Come and check your baby's weight, do a feeding and see how your baby is growing, visit with other mothers, plan on a walk or coffee date after group.  o Please wear a  mask  o Due to space limitations - no strollers please (Fresh c/section moms should use the stroller)  o We would love to have dads stay, but moms won't breastfeed.  o The room is only available from 10am -11am, there is a meeting prior and after.   o All diapers must be taken with you   o Breastfeeding [place] [facility]  - This is an emotional, informational and safe support [place] with your like-minded community that builds solidarity among moms  - The honest experiences of mothers are highly valued among the other mothers  - Tuesday  at 11am by Zotiesha,  you will be sent an invitation each week, please unsubscribe as you wish  - You may instead copy and paste this link: [email or web address]    - Passcode  [id number]  - You may share this link with friends; please don't post on social media.      Infant Exam Summary:    1.Healthy 4 day old with good growth and development. Anticipatory guidance was provided regarding feedings.   2. Weight growth WNL:  Created a plan to meet family's breastfeeding goals  3. Patient learning to breastfeed and needs  4. Patient with mild jaundice to chest, face and sclera      Contact Breastfeeding Medicine    or your Pediatrician for any of the following:   · Decreased wet or poopy diapers  · Decreased feeding  · Baby not waking up for feeds on own most of time.   · Irritability  · Lethargy  · Dry sticky mouth.   · Any breastfeeding questions or concerns.    In Conclusion:   Family present has verbalized what they can realistically do based on family dynamics, understanding a plan has to be doable to be effective and can be renegotiated at any time.  This is a complex and intimate journey. When obstacles present themselves, it takes confidence, persistence and support. You are now the focus of our Breastfeeding Medicine team; we are here to support your decisions and goals.      Follow up requires close monitoring in this time  sensitive window of opportunity to establish milk supply and facilitate the learning of  breastfeeding.    Mom is encouraged to e-mail to update how the plan is working.    Pediatrician appointment: Thursday, February 3, 2022    Follow-up for infant weight check and dyad breastfeeding evaluation in 5 day(s)  Please call [phone] if you have not scheduled your next appointment    A total of 65 minutes, not including infant assessment time, with more than 50% was spent preparing to see the patient, obtaining and reviewing separately obtained history, performing a medically appropriate examination and evaluation, counseling and educating the family, referring and communicating with other health care professionals, documenting clinical information in the electronic health record, independently interpreting weighted feeds and infant growth results, communicating these results to the family and care coordination as detailed in the above note.       Kelley Chris

## 2022-02-07 ENCOUNTER — OFFICE VISIT (OUTPATIENT)
Dept: OBGYN | Facility: CLINIC | Age: 27
End: 2022-02-07
Payer: COMMERCIAL

## 2022-02-07 DIAGNOSIS — O92.70 LACTATION PROBLEM: ICD-10-CM

## 2022-02-07 PROCEDURE — 99999 PR NO CHARGE: CPT | Performed by: NURSE PRACTITIONER

## 2022-02-07 NOTE — PROGRESS NOTES
Summary: Marilin has done an excellent job establishing a healthy milk supply and growing baby Norma well. She has been breastfeeding every 3 hours throughout the day, sometimes sooner if showing hunger cues. Baby has been more awake during the night. Attempting to offer both breast, most feedings baby is content after one side. Pumping after every feeding, yielding up to 4oz between both breast. Offering a bottle in place of the breast twice daily to ensure she is getting enough.   Today: Latched to both breast, cross cradle hold. Transferred 70mls from the right side in approximately 15 minutes. Awake and will to latch to the left breast. Transferred 24mls in 6/7 minutes. Baby spit up then was content to be dressed and held.   Plan: Breastfeed frequently throughout the day, every 1.5-3, up to 15 minutes on each side. If baby is not interested in the second breast, there is no need to force her. Most likely if the second breast is needed it will be in the afternoon or evening. Pump after or in place of feeding 3x daily. Can use the Haakaa as needed on the untouched breast for relief not to drain. Can offer replacement bottles as desired, supplement not needed.  Follow up:   Lactation appointment: 2022  Pediatrician appointment: 2022  Referrals: None    Subjective:     Marilin Buck is a 26 y.o. female here for lactation care. She is here today with her mother, Maura and baby, Norma. .      Concerns: Weight check and feeding evaluation, concerned baby is not getting enough    HPI:   Pertinent  history:   Mother does not have a history of advanced maternal age, GDM, hypertension prior to pregnancy, insulin resistance, multiple gestation, PCOS and thyroid disease. Common condition(s) which may interfere with milk supply.      Breast changes in pregnancy: Yes  History of breast surgeries: No      FEEDING HISTORY:    Past breastfeeding history: First baby   Hospital course:  Breastfeeding, started small volumes of supplement due to small baby and wanting to prevent nursery or NICU stay.   Prior to consultation on 2/2/2022: Breastfeeding every 2.5-3 hours throughout the day and night. Offering both breast, 10-20 minutes on each side. Topping off with 15-20mls after the breast. Started pumping after feedings yesterday. Pumped and bottle fed throughout the night, yielding and average of 15mls on each side.   Currently 2/7/2022: Breastfeeding every 3 hours throughout the day, sometimes sooner if showing hunger cues. Baby has been more awake during the night. Attempting to offer both breast, most feedings baby is content after one side. Pumping after every feeding, yielding up to 4oz between both breast. Offering a bottle in place of the breast twice daily to ensure she is getting enough.     Both breasts: Yes, mostly alternating between each breast  Bottle feeds: 2/24hrs    Supplement: Supplementation initiated inpatient and Expressed breast milk as  replacement  Quantity: 80mls  How given/devices:  Bottle    Nipple Shield Use: None    Breast Pumping:   Frequency: 8x  Type of pump: Spectra S1  Flange size/type: 24mm  NO pain with pumping    Maternal ROS:  Constitutional: No fever, chills. Feeling well  Breasts: No soreness of breasts and No soreness of nipples  Psychiatric: Experiencing anxiety and Managing ok  Mental Health: No mention of feeling irritable, agitated, angry, overwhelmed, apathy, exhaustion nor having sleep changes outside infant feeds/demands or appetite changes     Objective:     Maternal Physical   General: No acute distress  Breasts: Symetrical , Soft, Plugged Duct - no evidence and Mastitis  - no S/S  Nipples: intact  Psychiatric: Normal mood and affect. Her behavior is normal. Judgment and thought content normal   Mental Health: Did NOT exhibit sadness, crying, feeling overwhelmed, agitation or hypervigilance.     Assessment/Plan & Lactation Counseling:     Infant exam  on infant chart    Infant Weight History:   01/29/2022: 5# 10.3oz  02/01/2022: 5# 2oz (Ped)  02/02/2022: 5# 2.4oz  02/03/2022: 5# 3oz (Ped)  02/07/2022: 5# 7.6oz    Infant intake at Breast:  R   70mls   L  24mls    Total: 94mls  Milk Transfer at this feeding:   Effective breastfeeding  Pumped: Type of Pump: N/A    Quantity Pumped: N/A   Initiation of Feeding: Infant initiates  Position of Feeding:    Right: cross cradle  Left: cross cradle  Attachment Achieved: rapidly  Nipple shield: N/A   Suck Pattern at the breast: Suck burst and normal rest  Suck Pattern on the bottle: N/A   Behavior Following Observed Feeding: content  Nipple Pain: None      Latch: Mom latches independently  Suckling/Feeding: attaches, baby falls asleep, baby fed effectively, baby roots, elicits BRIAN, intermittent swallows and rhythmic  Milk Supply Available: normal, establishing well day 9. Will monitor for oversupply in the coming weeks    Maternal Diagnosis/Problem:  Lactation Problem: Managing supply       BREASTFEEDING PLAN  Discussed concerns and symptoms as listed above in assessment and guidance summarized below.  Shared decision making was used between myself and the family for this encounter, home care, and follow up.  Topics reviewed included:  • Breastsleeping: Discussed and referred to Academy of Breastfeeding Medicine protocol on safe sleeping  • Milk supply is establishing well. Will monitor for oversupply in the coming weeks. Trying to slightly reduce with less pumping.    • Feeding:   o Feed your baby every 1.5-3 hours, more often if baby acts hungry.   o Awaken baby for feeding if going over 3 hours in the day.   o Until back to birth weight, ONE four hour at night is acceptable if has had 8 prior feedings in 24 hours.    o Daily goal: 8-10 feedings per 24 hours.   • Supplement:   o No supplement is needed  o Can offer replacement bottles as desired  • Pumping Guidelines:  o Both breasts   o Pump 3 times in 24 hours  - After or  in place of 3 feedings  - Can use the Haakaa for relief as needed on the untouched breast  o Type of pump:  • Spectra  o How long will vary woman to woman, typically 8-15 minutes, or 1-2 minutes after flow slows  o Flange Fit: Freely moving nipple in the tunnel with some movement of the areola.    • Storage (Acceptable guidelines for healthy term babies)  o 10 hours at room temp including your pieces, may rinse but not mandatory  o 8 days refrigerator, don't need  to refrigerate right away if using fresh pumped milk at the next feeding  o Freezer 1 year  o Deep freezer 2 years  o American Academy or Pediatrics has said you may mix different temperature milks.   o If baby drinks breastmilk from a fresh or refrigerated bottle of breastmilk,  you may return the unused portion to the refrigerator and use once at next feeding.       • Connect with other mothers:  o Facebook:   • [place] [facility]: [email or web address]astfe[place]/  • Well[facility]ourished Babies (Private group for questions and support): [email or web address]/groups/976686129869767/  o [facility] [place] [facility]  • Tuesdays 10am - 11am. [facility] at [address], 3rd floor conference room  • Come and check your baby's weight, do a feeding and see how your baby is growing, visit with other mothers, plan on a walk or coffee date after group.  o Please wear a mask  o Due to space limitations - no strollers please (Fresh c/section moms should use the stroller)  o We would love to have dads stay, but moms won't breastfeed.  o The room is only available from 10am -11am, there is a meeting prior and after.   o All diapers must be taken with you   o [facility]  - This is an emotional, informational and safe support [place] with your like-minded community that builds solidarity among moms  - The honest experiences of mothers are highly valued among the other mothers  - Tuesday  at 11am by Zoom,  you will be sent an invitation  each week, please unsubscribe as you wish  - You may instead copy and paste this link: [email or web address]    - Passcode  [id number]  - You may share this link with friends; please don't post on social media.    In Conclusion:   Family present has verbalized what they can realistically do based on family dynamics, understanding a plan has to be doable to be effective and can be renegotiated at any time.  This is a complex and intimate journey. When obstacles present themselves, it takes confidence, persistence and support. You are now the focus of our Breastfeeding Medicine team; we are here to support your decisions and goals.      Follow up requires close monitoring in this time sensitive window of opportunity to establish milk supply and facilitate the learning of  breastfeeding.    Mom is encouraged to e-mail to update how the plan is working.    Pediatrician appointment: Tuesday, February 8, 2022    Follow-up for infant weight check and dyad breastfeeding evaluation in 11 day(s)  Please call [phone] if you have not scheduled your next appointment    A total of 60 minutes, not including infant assessment time, with more than 50% was spent preparing to see the patient, obtaining and reviewing separately obtained history, performing a medically appropriate examination and evaluation, counseling and educating the family, referring and communicating with other health care professionals, documenting clinical information in the electronic health record, independently interpreting weighted feeds and infant growth results, communicating these results to the family and care coordination as detailed in the above note.     Jarod ORTIZ, IBCLC    [facility]

## 2022-02-18 ENCOUNTER — APPOINTMENT (OUTPATIENT)
Dept: OBGYN | Facility: CLINIC | Age: 27
End: 2022-02-18
Payer: COMMERCIAL

## 2022-05-25 ENCOUNTER — TELEPHONE (OUTPATIENT)
Dept: HEALTH INFORMATION MANAGEMENT | Facility: OTHER | Age: 27
End: 2022-05-25

## 2022-05-25 ENCOUNTER — OFFICE VISIT (OUTPATIENT)
Dept: MEDICAL GROUP | Facility: LAB | Age: 27
End: 2022-05-25
Payer: COMMERCIAL

## 2022-05-25 VITALS
HEART RATE: 64 BPM | DIASTOLIC BLOOD PRESSURE: 58 MMHG | TEMPERATURE: 97.4 F | WEIGHT: 136 LBS | HEIGHT: 62 IN | OXYGEN SATURATION: 97 % | SYSTOLIC BLOOD PRESSURE: 100 MMHG | BODY MASS INDEX: 25.03 KG/M2 | RESPIRATION RATE: 16 BRPM

## 2022-05-25 DIAGNOSIS — K64.0 GRADE I HEMORRHOIDS: ICD-10-CM

## 2022-05-25 DIAGNOSIS — Z76.89 ENCOUNTER TO ESTABLISH CARE: ICD-10-CM

## 2022-05-25 DIAGNOSIS — Z13.29 THYROID DISORDER SCREENING: ICD-10-CM

## 2022-05-25 DIAGNOSIS — Z13.220 LIPID SCREENING: ICD-10-CM

## 2022-05-25 DIAGNOSIS — Z13.1 DIABETES MELLITUS SCREENING: ICD-10-CM

## 2022-05-25 PROCEDURE — 99395 PREV VISIT EST AGE 18-39: CPT | Performed by: PHYSICIAN ASSISTANT

## 2022-05-25 RX ORDER — HYDROCORTISONE ACETATE 25 MG/1
25 SUPPOSITORY RECTAL EVERY 12 HOURS
Qty: 12 SUPPOSITORY | Refills: 1 | Status: SHIPPED | OUTPATIENT
Start: 2022-05-25 | End: 2023-04-14

## 2022-05-25 RX ORDER — VALACYCLOVIR HYDROCHLORIDE 1 G/1
TABLET, FILM COATED ORAL
COMMUNITY
Start: 2022-04-08 | End: 2023-04-14

## 2022-06-14 ENCOUNTER — HOSPITAL ENCOUNTER (OUTPATIENT)
Dept: LAB | Facility: MEDICAL CENTER | Age: 27
End: 2022-06-14
Attending: PHYSICIAN ASSISTANT
Payer: COMMERCIAL

## 2022-06-14 DIAGNOSIS — Z13.29 THYROID DISORDER SCREENING: ICD-10-CM

## 2022-06-14 DIAGNOSIS — Z13.1 DIABETES MELLITUS SCREENING: ICD-10-CM

## 2022-06-14 DIAGNOSIS — Z13.220 LIPID SCREENING: ICD-10-CM

## 2022-06-14 LAB
ALBUMIN SERPL BCP-MCNC: 4.8 G/DL (ref 3.2–4.9)
ALBUMIN/GLOB SERPL: 1.8 G/DL
ALP SERPL-CCNC: 68 U/L (ref 30–99)
ALT SERPL-CCNC: 45 U/L (ref 2–50)
ANION GAP SERPL CALC-SCNC: 9 MMOL/L (ref 7–16)
AST SERPL-CCNC: 64 U/L (ref 12–45)
BASOPHILS # BLD AUTO: 0.5 % (ref 0–1.8)
BASOPHILS # BLD: 0.04 K/UL (ref 0–0.12)
BILIRUB SERPL-MCNC: 0.5 MG/DL (ref 0.1–1.5)
BUN SERPL-MCNC: 16 MG/DL (ref 8–22)
CALCIUM SERPL-MCNC: 9.5 MG/DL (ref 8.5–10.5)
CHLORIDE SERPL-SCNC: 105 MMOL/L (ref 96–112)
CHOLEST SERPL-MCNC: 231 MG/DL (ref 100–199)
CO2 SERPL-SCNC: 25 MMOL/L (ref 20–33)
CREAT SERPL-MCNC: 0.83 MG/DL (ref 0.5–1.4)
EOSINOPHIL # BLD AUTO: 0.42 K/UL (ref 0–0.51)
EOSINOPHIL NFR BLD: 5.2 % (ref 0–6.9)
ERYTHROCYTE [DISTWIDTH] IN BLOOD BY AUTOMATED COUNT: 43.8 FL (ref 35.9–50)
EST. AVERAGE GLUCOSE BLD GHB EST-MCNC: 111 MG/DL
FASTING STATUS PATIENT QL REPORTED: NORMAL
GFR SERPLBLD CREATININE-BSD FMLA CKD-EPI: 99 ML/MIN/1.73 M 2
GLOBULIN SER CALC-MCNC: 2.7 G/DL (ref 1.9–3.5)
GLUCOSE SERPL-MCNC: 87 MG/DL (ref 65–99)
HBA1C MFR BLD: 5.5 % (ref 4–5.6)
HCT VFR BLD AUTO: 41.8 % (ref 37–47)
HDLC SERPL-MCNC: 49 MG/DL
HGB BLD-MCNC: 14 G/DL (ref 12–16)
IMM GRANULOCYTES # BLD AUTO: 0.02 K/UL (ref 0–0.11)
IMM GRANULOCYTES NFR BLD AUTO: 0.2 % (ref 0–0.9)
LDLC SERPL CALC-MCNC: 164 MG/DL
LYMPHOCYTES # BLD AUTO: 2.27 K/UL (ref 1–4.8)
LYMPHOCYTES NFR BLD: 28.2 % (ref 22–41)
MCH RBC QN AUTO: 31 PG (ref 27–33)
MCHC RBC AUTO-ENTMCNC: 33.5 G/DL (ref 33.6–35)
MCV RBC AUTO: 92.7 FL (ref 81.4–97.8)
MONOCYTES # BLD AUTO: 0.67 K/UL (ref 0–0.85)
MONOCYTES NFR BLD AUTO: 8.3 % (ref 0–13.4)
NEUTROPHILS # BLD AUTO: 4.63 K/UL (ref 2–7.15)
NEUTROPHILS NFR BLD: 57.6 % (ref 44–72)
NRBC # BLD AUTO: 0 K/UL
NRBC BLD-RTO: 0 /100 WBC
PLATELET # BLD AUTO: 201 K/UL (ref 164–446)
PMV BLD AUTO: 11.2 FL (ref 9–12.9)
POTASSIUM SERPL-SCNC: 4.2 MMOL/L (ref 3.6–5.5)
PROT SERPL-MCNC: 7.5 G/DL (ref 6–8.2)
RBC # BLD AUTO: 4.51 M/UL (ref 4.2–5.4)
SODIUM SERPL-SCNC: 139 MMOL/L (ref 135–145)
TRIGL SERPL-MCNC: 91 MG/DL (ref 0–149)
TSH SERPL DL<=0.005 MIU/L-ACNC: 1.24 UIU/ML (ref 0.38–5.33)
WBC # BLD AUTO: 8.1 K/UL (ref 4.8–10.8)

## 2022-06-14 PROCEDURE — 85025 COMPLETE CBC W/AUTO DIFF WBC: CPT

## 2022-06-14 PROCEDURE — 80053 COMPREHEN METABOLIC PANEL: CPT

## 2022-06-14 PROCEDURE — 80061 LIPID PANEL: CPT

## 2022-06-14 PROCEDURE — 83036 HEMOGLOBIN GLYCOSYLATED A1C: CPT

## 2022-06-14 PROCEDURE — 84443 ASSAY THYROID STIM HORMONE: CPT

## 2022-06-14 PROCEDURE — 36415 COLL VENOUS BLD VENIPUNCTURE: CPT

## 2022-06-20 ENCOUNTER — OFFICE VISIT (OUTPATIENT)
Dept: MEDICAL GROUP | Facility: LAB | Age: 27
End: 2022-06-20
Payer: COMMERCIAL

## 2022-06-20 VITALS
BODY MASS INDEX: 24.84 KG/M2 | HEIGHT: 62 IN | OXYGEN SATURATION: 96 % | HEART RATE: 62 BPM | DIASTOLIC BLOOD PRESSURE: 62 MMHG | TEMPERATURE: 97.7 F | RESPIRATION RATE: 16 BRPM | WEIGHT: 135 LBS | SYSTOLIC BLOOD PRESSURE: 104 MMHG

## 2022-06-20 DIAGNOSIS — E78.00 ELEVATED LDL CHOLESTEROL LEVEL: ICD-10-CM

## 2022-06-20 DIAGNOSIS — R53.83 FATIGUE, UNSPECIFIED TYPE: ICD-10-CM

## 2022-06-20 PROCEDURE — 99213 OFFICE O/P EST LOW 20 MIN: CPT | Performed by: PHYSICIAN ASSISTANT

## 2022-06-20 ASSESSMENT — FIBROSIS 4 INDEX: FIB4 SCORE: 1.28

## 2022-06-20 NOTE — PROGRESS NOTES
"Subjective:     CC: 1 mo f/u    HPI:   Marilin here today with     Recent labs showed elevated LDL cholesterol. Pt reports a possible family hx of hyperlipidemia. She works out regularly and eats a healthy diet.     ROS:  Gen: no fevers/chills, no changes in weight  Pulm: no sob, no cough  CV: no chest pain, no palpitations  GI: no nausea/vomiting, no diarrhea  Neuro: no headaches, no numbness/tingling  Heme/Lymph: no easy bruising    Current Outpatient Medications Ordered in Epic   Medication Sig Dispense Refill   • valacyclovir (VALTREX) 1 GM Tab      • Prenatal MV-Min-Fe Fum-FA-DHA (PRENATAL 1 PO) Take  by mouth.     • hydrocortisone (ANUSOL-HC) 25 MG Suppos Insert 1 Suppository into the rectum every 12 hours. 12 Suppository 1     No current [facility]-ordered facility-administered medications on file.         Objective:     Exam:  /62   Pulse 62   Temp 36.5 °C (97.7 °F)   Resp 16   Ht 1.575 m (5' 2\")   Wt 61.2 kg (135 lb)   SpO2 96%   BMI 24.69 kg/m²  Body mass index is 24.69 kg/m².    Gen: Alert and oriented, No apparent distress.  Neck: Neck is supple without lymphadenopathy.  Lungs: Normal effort, CTA bilaterally, no wheezes, rhonchi, or rales  CV: Regular rate and rhythm. No murmurs, rubs, or gallops.  Ext: No clubbing, cyanosis, edema.    Assessment & Plan:     26 y.o. female with the following -     1. Fatigue, unspecified type  Chronic condition  TSH WNL  - Comp Metabolic Panel; Future    2. Elevated LDL cholesterol level  Chronic condition  Trial of increased fiber intake, omega-3 supplement, red yeast rice extract  Recheck in 3 months  - VITAMIN D,25 HYDROXY; Future  - Lipid Profile; Future      I spent a total of 15 minutes with record review, exam, communication with the patient, communication with other providers, and documentation of this encounter.      Return in about 3 months (around 9/20/2022).    Please note that this dictation was created using voice recognition software. I have made " every reasonable attempt to correct obvious errors, but there may be errors of grammar and possibly content that I did not discover before finalizing the note.

## 2023-04-14 ENCOUNTER — OFFICE VISIT (OUTPATIENT)
Dept: URGENT CARE | Facility: CLINIC | Age: 28
End: 2023-04-14
Payer: COMMERCIAL

## 2023-04-14 VITALS
HEART RATE: 58 BPM | SYSTOLIC BLOOD PRESSURE: 114 MMHG | WEIGHT: 144 LBS | BODY MASS INDEX: 27.19 KG/M2 | TEMPERATURE: 98.5 F | HEIGHT: 61 IN | OXYGEN SATURATION: 97 % | RESPIRATION RATE: 16 BRPM | DIASTOLIC BLOOD PRESSURE: 70 MMHG

## 2023-04-14 DIAGNOSIS — B96.89 SINUSITIS, BACTERIAL: ICD-10-CM

## 2023-04-14 DIAGNOSIS — J32.9 SINUSITIS, BACTERIAL: ICD-10-CM

## 2023-04-14 PROCEDURE — 99213 OFFICE O/P EST LOW 20 MIN: CPT | Performed by: STUDENT IN AN ORGANIZED HEALTH CARE EDUCATION/TRAINING PROGRAM

## 2023-04-14 RX ORDER — AMOXICILLIN AND CLAVULANATE POTASSIUM 875; 125 MG/1; MG/1
1 TABLET, FILM COATED ORAL 2 TIMES DAILY
Qty: 14 TABLET | Refills: 0 | Status: SHIPPED | OUTPATIENT
Start: 2023-04-14 | End: 2023-04-21

## 2023-04-14 ASSESSMENT — ENCOUNTER SYMPTOMS
WHEEZING: 0
ABDOMINAL PAIN: 0
PALPITATIONS: 0
DIARRHEA: 0
HEADACHES: 1
NECK PAIN: 0
SINUS PAIN: 1
SWOLLEN GLANDS: 0
NAUSEA: 0
DIAPHORESIS: 0
VOMITING: 0
CHILLS: 0
FEVER: 0
COUGH: 0
SORE THROAT: 0
SHORTNESS OF BREATH: 0
DIZZINESS: 0
HOARSE VOICE: 0
SINUS PRESSURE: 1

## 2023-04-14 ASSESSMENT — FIBROSIS 4 INDEX: FIB4 SCORE: 1.33

## 2023-04-14 NOTE — PROGRESS NOTES
"Subjective     Marilin Buck is a 27 y.o. female who presents with Sinus Problem (X 2 weeks, RT side face pain, ear pain, following a cold.)            Marilin is a 27 y.o. female who presents to urgent care for symptoms of right-sided sinus pain/pressure and right ear pain.  Patient states symptoms initially started after her URI-like illness. Patient states that symptoms then developed into sinus pain/pressure.  Sinus pain/pressure has been present for 2 weeks and has been unchanged since onset.  Patient recently developed right ear pain.  No fever/chills.  Patient has had no improvement with supportive care measures.    Sinus Problem  This is a new problem. The current episode started 1 to 4 weeks ago. The problem is unchanged. There has been no fever. The pain is mild. Associated symptoms include congestion, ear pain, headaches and sinus pressure. Pertinent negatives include no chills, coughing, diaphoresis, hoarse voice, neck pain, shortness of breath, sneezing, sore throat or swollen glands. Past treatments include oral decongestants and acetaminophen. The treatment provided mild relief.     Review of Systems   Constitutional:  Negative for chills, diaphoresis, fever and malaise/fatigue.   HENT:  Positive for congestion, ear pain, sinus pressure and sinus pain. Negative for hoarse voice, sneezing and sore throat.    Respiratory:  Negative for cough, shortness of breath and wheezing.    Cardiovascular:  Negative for chest pain and palpitations.   Gastrointestinal:  Negative for abdominal pain, diarrhea, nausea and vomiting.   Musculoskeletal:  Negative for neck pain.   Neurological:  Positive for headaches. Negative for dizziness.   All other systems reviewed and are negative.           Objective     /70   Pulse (!) 58   Temp 36.9 °C (98.5 °F) (Temporal)   Resp 16   Ht 1.549 m (5' 1\")   Wt 65.3 kg (144 lb)   SpO2 97%   BMI 27.21 kg/m²      Physical Exam  Vitals reviewed.   Constitutional:       " Appearance: Normal appearance.   HENT:      Head: Normocephalic and atraumatic.      Right Ear: Ear canal and external ear normal. Tympanic membrane is injected and bulging.      Left Ear: Tympanic membrane, ear canal and external ear normal.      Nose: Congestion present.      Right Sinus: Maxillary sinus tenderness and frontal sinus tenderness present.      Left Sinus: No maxillary sinus tenderness or frontal sinus tenderness.      Mouth/Throat:      Lips: Pink.      Mouth: Mucous membranes are moist.      Pharynx: Oropharynx is clear. Uvula midline. Posterior oropharyngeal erythema present. No oropharyngeal exudate.   Eyes:      Extraocular Movements: Extraocular movements intact.      Conjunctiva/sclera: Conjunctivae normal.      Pupils: Pupils are equal, round, and reactive to light.   Cardiovascular:      Rate and Rhythm: Normal rate and regular rhythm.   Pulmonary:      Effort: Pulmonary effort is normal.      Breath sounds: Normal breath sounds.   Musculoskeletal:      Cervical back: Normal range of motion. No rigidity.   Lymphadenopathy:      Cervical: No cervical adenopathy.   Skin:     General: Skin is warm and dry.   Neurological:      General: No focal deficit present.      Mental Status: She is alert. Mental status is at baseline.                           Assessment & Plan        1. Sinusitis, bacterial  - amoxicillin-clavulanate (AUGMENTIN) 875-125 MG Tab; Take 1 Tablet by mouth 2 times a day for 7 days.  Dispense: 14 Tablet; Refill: 0     Supportive care measures and indications for immediate follow-up discussed with patient. Pathogenesis of diagnosis discussed including typical length and natural progression.  See AVS.    Instructed to return to urgent care or nearest emergency department if symptoms fail to improve, for any change in condition, further concerns, or new concerning symptoms.    Patient states understanding and agrees with the plan of care and discharge instructions.

## 2023-06-13 ENCOUNTER — OFFICE VISIT (OUTPATIENT)
Dept: URGENT CARE | Facility: CLINIC | Age: 28
End: 2023-06-13
Payer: COMMERCIAL

## 2023-06-13 VITALS
RESPIRATION RATE: 16 BRPM | SYSTOLIC BLOOD PRESSURE: 102 MMHG | BODY MASS INDEX: 24.35 KG/M2 | TEMPERATURE: 98.1 F | WEIGHT: 129 LBS | DIASTOLIC BLOOD PRESSURE: 58 MMHG | OXYGEN SATURATION: 96 % | HEART RATE: 70 BPM | HEIGHT: 61 IN

## 2023-06-13 DIAGNOSIS — J01.10 ACUTE NON-RECURRENT FRONTAL SINUSITIS: ICD-10-CM

## 2023-06-13 DIAGNOSIS — H66.001 NON-RECURRENT ACUTE SUPPURATIVE OTITIS MEDIA OF RIGHT EAR WITHOUT SPONTANEOUS RUPTURE OF TYMPANIC MEMBRANE: ICD-10-CM

## 2023-06-13 PROCEDURE — 3074F SYST BP LT 130 MM HG: CPT | Performed by: PHYSICIAN ASSISTANT

## 2023-06-13 PROCEDURE — 99213 OFFICE O/P EST LOW 20 MIN: CPT | Performed by: PHYSICIAN ASSISTANT

## 2023-06-13 PROCEDURE — 3078F DIAST BP <80 MM HG: CPT | Performed by: PHYSICIAN ASSISTANT

## 2023-06-13 RX ORDER — AMOXICILLIN AND CLAVULANATE POTASSIUM 875; 125 MG/1; MG/1
1 TABLET, FILM COATED ORAL 2 TIMES DAILY
Qty: 14 TABLET | Refills: 0 | Status: SHIPPED | OUTPATIENT
Start: 2023-06-13 | End: 2023-06-20

## 2023-06-13 ASSESSMENT — ENCOUNTER SYMPTOMS
CHILLS: 0
SINUS PAIN: 1
FEVER: 0

## 2023-06-13 ASSESSMENT — FIBROSIS 4 INDEX: FIB4 SCORE: 1.33

## 2023-06-13 NOTE — PROGRESS NOTES
Subjective:   Marilin Buck is a 27 y.o. female who presents today with   Chief Complaint   Patient presents with    Sinus Problem     Recently traveled, Rt ear pain/nasal draining, Rt sinus pain x 4 weeks       Sinus Problem  The current episode started 1 to 4 weeks ago. The problem has been gradually worsening since onset. There has been no fever. Associated symptoms include congestion and ear pain. Pertinent negatives include no chills.     Patient was last seen in April and treated for sinus infection with antibiotic at that time.  Patient has chronic sinusitis with history of sinus surgery.  Initially started as viral/cold symptoms but has been persistent over the last several weeks.  Feels similar to previous sinus infections    Patient states she is no longer breast-feeding.    PMH:  has a past medical history of Acne.    She has no past medical history of Blood clotting disorder ([facility]), Diabetes ([facility]), Gynecological disorder, Hemorrhagic disorder ([facility]), Hypertension, Seizure ([facility]), or Stroke ([facility]).  MEDS:   Current Outpatient Medications:     amoxicillin-clavulanate (AUGMENTIN) 875-125 MG Tab, Take 1 Tablet by mouth 2 times a day for 7 days., Disp: 14 Tablet, Rfl: 0    Prenatal MV-Min-Fe Fum-FA-DHA (PRENATAL 1 PO), Take  by mouth. (Patient not taking: Reported on 6/13/2023), Disp: , Rfl:   ALLERGIES:   Allergies   Allergen Reactions    Tetracyclines Rash    Kenalog [Triamcinolone]      Anaphylaxis      SURGHX:   Past Surgical History:   Procedure Laterality Date    SEPTOPLASTY N/A 12/4/2018    Procedure: SEPTOPLASTY;  Surgeon: Portillo Avila M.D.;  Location: SURGERY SAME DAY [facility];  Service: Ent    TURBINOPLASTY Bilateral 12/4/2018    Procedure: TURBINOPLASTY;  Surgeon: Portillo Avila M.D.;  Location: SURGERY SAME DAY [facility];  Service: Ent    ETHMOIDECTOMY N/A 12/4/2018    Procedure: ETHMOIDECTOMY- ENDOSCOPIC TOTAL;  Surgeon: Portillo Avila M.D.;  Location: SURGERY SAME DAY [facility];  Service:  "Ent    ANTROSTOMY N/A 12/4/2018    Procedure: ANTROSTOMY- ENDOSCOPIC MAXILLARY;  Surgeon: Portillo Avila M.D.;  Location: SURGERY SAME DAY [facility];  Service: Ent    SEPTOPLASTY Bilateral 8/3/2015    Procedure: SEPTOPLASTY W/BILAT ENDOSCOPIC CARTER BULLOSA RESECTION;  Surgeon: ALEYDA Waldron M.D.;  Location: SURGERY SAME DAY [facility];  Service:     TURBINOPLASTY Bilateral 8/3/2015    Procedure: TURBINOPLASTY;  Surgeon: ALEYDA Waldron M.D.;  Location: SURGERY SAME DAY [facility];  Service:     ETHMOIDECTOMY Bilateral 8/3/2015    Procedure: ENDOSCOPIC TOTAL ETHMOIDECTOMY;  Surgeon: ALEYDA Waldron M.D.;  Location: SURGERY SAME DAY [facility];  Service:     ANTROSTOMY Bilateral 8/3/2015    Procedure: ENDOSCOPIC MAXILLARY ANTROSTOMY WITH TISSUE REMOVAL;  Surgeon: ALEYDA Waldron M.D.;  Location: SURGERY SAME DAY [facility];  Service:     OTHER  5/25/2012    removal of wisdom teeth     SOCHX:  reports that she has never smoked. She has never used smokeless tobacco. She reports that she does not drink alcohol and does not use drugs.  FH: Reviewed with patient, not pertinent to this visit.       Review of Systems   Constitutional:  Negative for chills and fever.   HENT:  Positive for congestion, ear pain and sinus pain.         Objective:   /58 (BP Location: Left arm, Patient Position: Sitting, BP Cuff Size: Adult)   Pulse 70   Temp 36.7 °C (98.1 °F) (Temporal)   Resp 16   Ht 1.549 m (5' 1\")   Wt 58.5 kg (129 lb)   SpO2 96%   Breastfeeding No   BMI 24.37 kg/m²   Physical Exam  Vitals and nursing note reviewed.   Constitutional:       General: She is not in acute distress.     Appearance: Normal appearance. She is well-developed. She is not ill-appearing or toxic-appearing.   HENT:      Head: Normocephalic and atraumatic.      Right Ear: Hearing and ear canal normal. A middle ear effusion is present. Tympanic membrane is erythematous. Tympanic membrane is not bulging.      Left " Ear: Hearing, tympanic membrane and ear canal normal. Tympanic membrane is not bulging.      Nose: Mucosal edema and congestion present.      Right Sinus: Frontal sinus tenderness present.      Mouth/Throat:      Mouth: Mucous membranes are moist.      Pharynx: No oropharyngeal exudate or posterior oropharyngeal erythema.   Cardiovascular:      Rate and Rhythm: Normal rate and regular rhythm.      Heart sounds: Normal heart sounds.   Pulmonary:      Effort: Pulmonary effort is normal.      Breath sounds: Normal breath sounds. No stridor. No wheezing, rhonchi or rales.   Musculoskeletal:      Comments: Normal movement in all 4 extremities   Skin:     General: Skin is warm and dry.   Neurological:      Mental Status: She is alert.      Coordination: Coordination normal.   Psychiatric:         Mood and Affect: Mood normal.       Assessment/Plan:   Assessment    1. Acute non-recurrent frontal sinusitis  - amoxicillin-clavulanate (AUGMENTIN) 875-125 MG Tab; Take 1 Tablet by mouth 2 times a day for 7 days.  Dispense: 14 Tablet; Refill: 0    2. Non-recurrent acute suppurative otitis media of right ear without spontaneous rupture of tympanic membrane  - amoxicillin-clavulanate (AUGMENTIN) 875-125 MG Tab; Take 1 Tablet by mouth 2 times a day for 7 days.  Dispense: 14 Tablet; Refill: 0    Symptoms and presentation consistent with sinus infection which we will treat accordingly with antibiotics.  Would recommend following up with her ENT as well.    Would recommend use of sinus rinse, nasal spray or Bloomer pot to help alleviate symptoms.  Patient should also trial use of over-the-counter antihistamine/decongestant per 's instructions to help as well.    Differential diagnosis, natural history, supportive care, and indications for immediate follow-up discussed.   Patient given instructions and understanding of medications and treatment.    If not improving in 3-5 days, F/U with PCP or return to  if symptoms  worsen.    Patient agreeable to plan.      Please note that this dictation was created using voice recognition software. I have made every reasonable attempt to correct obvious errors, but I expect that there are errors of grammar and possibly content that I did not discover before finalizing the note.    Mateo Abreu PA-C

## 2023-09-15 ENCOUNTER — HOSPITAL ENCOUNTER (OUTPATIENT)
Facility: MEDICAL CENTER | Age: 28
End: 2023-09-15
Attending: OBSTETRICS & GYNECOLOGY
Payer: COMMERCIAL

## 2023-09-15 LAB
ABO GROUP BLD: NORMAL
BASOPHILS # BLD AUTO: 0.2 % (ref 0–1.8)
BASOPHILS # BLD: 0.02 K/UL (ref 0–0.12)
BLD GP AB SCN SERPL QL: NORMAL
EOSINOPHIL # BLD AUTO: 0.44 K/UL (ref 0–0.51)
EOSINOPHIL NFR BLD: 5.3 % (ref 0–6.9)
ERYTHROCYTE [DISTWIDTH] IN BLOOD BY AUTOMATED COUNT: 43.8 FL (ref 35.9–50)
HBV SURFACE AG SER QL: NORMAL
HCT VFR BLD AUTO: 39.7 % (ref 37–47)
HCV AB SER QL: NORMAL
HGB BLD-MCNC: 13.1 G/DL (ref 12–16)
HIV 1+2 AB+HIV1 P24 AG SERPL QL IA: NORMAL
IMM GRANULOCYTES # BLD AUTO: 0.03 K/UL (ref 0–0.11)
IMM GRANULOCYTES NFR BLD AUTO: 0.4 % (ref 0–0.9)
LYMPHOCYTES # BLD AUTO: 2.14 K/UL (ref 1–4.8)
LYMPHOCYTES NFR BLD: 25.7 % (ref 22–41)
MCH RBC QN AUTO: 30.5 PG (ref 27–33)
MCHC RBC AUTO-ENTMCNC: 33 G/DL (ref 32.2–35.5)
MCV RBC AUTO: 92.5 FL (ref 81.4–97.8)
MONOCYTES # BLD AUTO: 0.5 K/UL (ref 0–0.85)
MONOCYTES NFR BLD AUTO: 6 % (ref 0–13.4)
NEUTROPHILS # BLD AUTO: 5.21 K/UL (ref 1.82–7.42)
NEUTROPHILS NFR BLD: 62.4 % (ref 44–72)
NRBC # BLD AUTO: 0 K/UL
NRBC BLD-RTO: 0 /100 WBC (ref 0–0.2)
PLATELET # BLD AUTO: 196 K/UL (ref 164–446)
PMV BLD AUTO: 10.7 FL (ref 9–12.9)
RBC # BLD AUTO: 4.29 M/UL (ref 4.2–5.4)
RH BLD: NORMAL
RUBV AB SER QL: 206 IU/ML
T PALLIDUM AB SER QL IA: NORMAL
WBC # BLD AUTO: 8.3 K/UL (ref 4.8–10.8)

## 2023-09-15 PROCEDURE — 87389 HIV-1 AG W/HIV-1&-2 AB AG IA: CPT

## 2023-09-15 PROCEDURE — 85025 COMPLETE CBC W/AUTO DIFF WBC: CPT

## 2023-09-15 PROCEDURE — 87086 URINE CULTURE/COLONY COUNT: CPT

## 2023-09-15 PROCEDURE — 86592 SYPHILIS TEST NON-TREP QUAL: CPT

## 2023-09-15 PROCEDURE — 86780 TREPONEMA PALLIDUM: CPT

## 2023-09-15 PROCEDURE — 86762 RUBELLA ANTIBODY: CPT

## 2023-09-15 PROCEDURE — 86901 BLOOD TYPING SEROLOGIC RH(D): CPT

## 2023-09-15 PROCEDURE — 87340 HEPATITIS B SURFACE AG IA: CPT

## 2023-09-15 PROCEDURE — 86900 BLOOD TYPING SEROLOGIC ABO: CPT

## 2023-09-15 PROCEDURE — 86787 VARICELLA-ZOSTER ANTIBODY: CPT

## 2023-09-15 PROCEDURE — 86850 RBC ANTIBODY SCREEN: CPT

## 2023-09-15 PROCEDURE — 86803 HEPATITIS C AB TEST: CPT

## 2023-09-17 LAB
BACTERIA UR CULT: NORMAL
SIGNIFICANT IND 70042: NORMAL
SITE SITE: NORMAL
SOURCE SOURCE: NORMAL
VZV IGG SER IA-ACNC: 666.1 IV

## 2024-01-02 ENCOUNTER — HOSPITAL ENCOUNTER (OUTPATIENT)
Dept: LAB | Facility: MEDICAL CENTER | Age: 29
End: 2024-01-02
Attending: NURSE PRACTITIONER
Payer: COMMERCIAL

## 2024-01-02 LAB
BASOPHILS # BLD AUTO: 0.2 % (ref 0–1.8)
BASOPHILS # BLD: 0.02 K/UL (ref 0–0.12)
EOSINOPHIL # BLD AUTO: 0.35 K/UL (ref 0–0.51)
EOSINOPHIL NFR BLD: 3.6 % (ref 0–6.9)
ERYTHROCYTE [DISTWIDTH] IN BLOOD BY AUTOMATED COUNT: 46.9 FL (ref 35.9–50)
GLUCOSE 1H P 50 G GLC PO SERPL-MCNC: 98 MG/DL (ref 70–139)
HCT VFR BLD AUTO: 38 % (ref 37–47)
HGB BLD-MCNC: 12.5 G/DL (ref 12–16)
IMM GRANULOCYTES # BLD AUTO: 0.05 K/UL (ref 0–0.11)
IMM GRANULOCYTES NFR BLD AUTO: 0.5 % (ref 0–0.9)
LYMPHOCYTES # BLD AUTO: 2.34 K/UL (ref 1–4.8)
LYMPHOCYTES NFR BLD: 24 % (ref 22–41)
MCH RBC QN AUTO: 31.6 PG (ref 27–33)
MCHC RBC AUTO-ENTMCNC: 32.9 G/DL (ref 32.2–35.5)
MCV RBC AUTO: 96 FL (ref 81.4–97.8)
MONOCYTES # BLD AUTO: 0.56 K/UL (ref 0–0.85)
MONOCYTES NFR BLD AUTO: 5.7 % (ref 0–13.4)
NEUTROPHILS # BLD AUTO: 6.42 K/UL (ref 1.82–7.42)
NEUTROPHILS NFR BLD: 66 % (ref 44–72)
NRBC # BLD AUTO: 0 K/UL
NRBC BLD-RTO: 0 /100 WBC (ref 0–0.2)
PLATELET # BLD AUTO: 203 K/UL (ref 164–446)
PMV BLD AUTO: 11.2 FL (ref 9–12.9)
RBC # BLD AUTO: 3.96 M/UL (ref 4.2–5.4)
T PALLIDUM AB SER QL IA: NORMAL
WBC # BLD AUTO: 9.7 K/UL (ref 4.8–10.8)

## 2024-01-02 PROCEDURE — 86780 TREPONEMA PALLIDUM: CPT

## 2024-01-02 PROCEDURE — 82950 GLUCOSE TEST: CPT

## 2024-01-02 PROCEDURE — 85025 COMPLETE CBC W/AUTO DIFF WBC: CPT

## 2024-01-09 ENCOUNTER — OFFICE VISIT (OUTPATIENT)
Dept: MEDICAL GROUP | Facility: LAB | Age: 29
End: 2024-01-09
Payer: COMMERCIAL

## 2024-01-09 ENCOUNTER — HOSPITAL ENCOUNTER (OUTPATIENT)
Dept: LAB | Facility: MEDICAL CENTER | Age: 29
End: 2024-01-09
Attending: PHYSICIAN ASSISTANT
Payer: COMMERCIAL

## 2024-01-09 VITALS
HEART RATE: 82 BPM | HEIGHT: 61 IN | BODY MASS INDEX: 28.18 KG/M2 | WEIGHT: 149.25 LBS | DIASTOLIC BLOOD PRESSURE: 70 MMHG | TEMPERATURE: 97.1 F | SYSTOLIC BLOOD PRESSURE: 120 MMHG | OXYGEN SATURATION: 96 % | RESPIRATION RATE: 16 BRPM

## 2024-01-09 DIAGNOSIS — Z00.00 WELLNESS EXAMINATION: ICD-10-CM

## 2024-01-09 DIAGNOSIS — Z01.84 IMMUNITY STATUS TESTING: ICD-10-CM

## 2024-01-09 DIAGNOSIS — E78.00 ELEVATED LDL CHOLESTEROL LEVEL: ICD-10-CM

## 2024-01-09 DIAGNOSIS — R53.83 FATIGUE, UNSPECIFIED TYPE: ICD-10-CM

## 2024-01-09 DIAGNOSIS — Z34.90 PREGNANCY, UNSPECIFIED GESTATIONAL AGE: ICD-10-CM

## 2024-01-09 DIAGNOSIS — Z13.79 GENETIC SCREENING: ICD-10-CM

## 2024-01-09 LAB
HBV CORE AB SERPL QL IA: NONREACTIVE
HBV SURFACE AB SERPL IA-ACNC: 6477 MIU/ML (ref 0–10)
HBV SURFACE AG SER QL: NORMAL
TSH SERPL DL<=0.005 MIU/L-ACNC: 1.47 UIU/ML (ref 0.38–5.33)

## 2024-01-09 PROCEDURE — 86765 RUBEOLA ANTIBODY: CPT

## 2024-01-09 PROCEDURE — 86704 HEP B CORE ANTIBODY TOTAL: CPT

## 2024-01-09 PROCEDURE — 36415 COLL VENOUS BLD VENIPUNCTURE: CPT

## 2024-01-09 PROCEDURE — 86480 TB TEST CELL IMMUN MEASURE: CPT

## 2024-01-09 PROCEDURE — 3078F DIAST BP <80 MM HG: CPT | Performed by: PHYSICIAN ASSISTANT

## 2024-01-09 PROCEDURE — 86706 HEP B SURFACE ANTIBODY: CPT

## 2024-01-09 PROCEDURE — 3074F SYST BP LT 130 MM HG: CPT | Performed by: PHYSICIAN ASSISTANT

## 2024-01-09 PROCEDURE — 84443 ASSAY THYROID STIM HORMONE: CPT

## 2024-01-09 PROCEDURE — 99395 PREV VISIT EST AGE 18-39: CPT | Performed by: PHYSICIAN ASSISTANT

## 2024-01-09 PROCEDURE — 87340 HEPATITIS B SURFACE AG IA: CPT

## 2024-01-09 PROCEDURE — 86762 RUBELLA ANTIBODY: CPT

## 2024-01-09 PROCEDURE — 86735 MUMPS ANTIBODY: CPT

## 2024-01-09 PROCEDURE — 86787 VARICELLA-ZOSTER ANTIBODY: CPT

## 2024-01-09 RX ORDER — HYDROCORTISONE ACETATE 25 MG/1
SUPPOSITORY RECTAL
COMMUNITY
Start: 2023-12-04

## 2024-01-09 ASSESSMENT — PATIENT HEALTH QUESTIONNAIRE - PHQ9: CLINICAL INTERPRETATION OF PHQ2 SCORE: 0

## 2024-01-09 ASSESSMENT — FIBROSIS 4 INDEX: FIB4 SCORE: 1.362936671999871815

## 2024-01-09 NOTE — PROGRESS NOTES
"Subjective:     CC:   Chief Complaint   Patient presents with    Annual Exam       HPI:   Marilin Buck is a 28 y.o. female who presents for annual exam. She is feeling well and denies any complaints.    Starting [employer]- NP program, needs physical form filled out and titers ordered    Currently pregnant, due in March    Health Maintenance  PT/vit D for falls prevention: taking prenatal  Cholesterol Screening: ordered   Diabetes Screening: UTD   Diet: regular   Exercise: \"tried\"   Sleep: good  Substance Abuse: denies  Safe in relationship.  Seat belts, bike helmet, gun safety discussed.  Sun protection used.    Cancer screening  Cervical Cancer Screening: follows with obgyn    Infectious disease screening/Immunizations  --STI Screening: UTD  --Practices safe sex.  --HIV Screening: UTD   --Hepatitis C Screening: UTD   --Immunizations: UTD     She  has a past medical history of Acne.    She has no past medical history of Blood clotting disorder (HCC), Diabetes (HCC), Gynecological disorder, Hemorrhagic disorder (HCC), Hypertension, Seizure (HCC), or Stroke (HCC).  She  has a past surgical history that includes other (2012); septoplasty (Bilateral, 8/3/2015); turbinoplasty (Bilateral, 8/3/2015); ethmoidectomy (Bilateral, 8/3/2015); antrostomy (Bilateral, 8/3/2015); septoplasty (N/A, 2018); turbinoplasty (Bilateral, 2018); ethmoidectomy (N/A, 2018); and antrostomy (N/A, 2018).    Family History   Problem Relation Age of Onset    No Known Problems Mother     No Known Problems Father        Social History     Socioeconomic History    Marital status: Single     Spouse name: Not on file    Number of children: Not on file    Years of education: Not on file    Highest education level: Not on file   Occupational History    Not on file   Tobacco Use    Smoking status: Never    Smokeless tobacco: Never   Vaping Use    Vaping Use: Never used   Substance and Sexual Activity    Alcohol use: No    Drug " use: No    Sexual activity: Not on file   Other Topics Concern    Not on file   Social History Narrative    Not on file     Social Determinants of Health     Financial Resource Strain: Not on file   Food Insecurity: Not on file   Transportation Needs: Not on file   Physical Activity: Not on file   Stress: Not on file   Social Connections: Not on file   Intimate Partner Violence: Not on file   Housing Stability: Not on file       Patient Active Problem List    Diagnosis Date Noted    Mother currently breast-feeding 05/25/2022    Grade I hemorrhoids 04/11/2018    Deviated nasal septum 08/03/2015    Chronic maxillary sinusitis 08/03/2015    Chronic ethmoidal sinusitis 08/03/2015    Hypertrophy of nasal turbinates 08/03/2015    Cyst of bone, localized 08/03/2015    Exercise-induced asthma 02/27/2014         Current Outpatient Medications   Medication Sig Dispense Refill    hydrocortisone (ANUSOL-HC) 25 MG Suppos 1 SUPPOSITORY RECTAL ONCE A DAY 30 DAYS      Prenatal MV-Min-Fe Fum-FA-DHA (PRENATAL 1 PO) Take  by mouth.       No current facility-administered medications for this visit.     Allergies   Allergen Reactions    Tetracyclines Rash    Kenalog [Triamcinolone]      Anaphylaxis        Review of Systems   Constitutional: Negative for fever, chills and malaise/fatigue.   HENT: Negative for congestion.    Eyes: Negative for pain.    Respiratory: Negative for cough and shortness of breath.  Cardiovascular: Negative for leg swelling.   Gastrointestinal: Negative for nausea, vomiting, abdominal pain and diarrhea.   Genitourinary: Negative for dysuria and hematuria.   Skin: Negative for rash.   Neurological: Negative for dizziness, focal weakness and headaches.   Endo/Heme/Allergies: Does not bleed easily.   Psychiatric/Behavioral: Negative for depression.  The patient is not nervous/anxious.      Objective:     /70 (BP Location: Left arm, Patient Position: Sitting, BP Cuff Size: Adult)   Pulse 82   Temp 36.2 °C  "(97.1 °F) (Temporal)   Resp 16   Ht 1.549 m (5' 1\")   Wt 67.7 kg (149 lb 4 oz)   SpO2 96%   BMI 28.20 kg/m²   Body mass index is 28.2 kg/m².  Wt Readings from Last 4 Encounters:   01/09/24 67.7 kg (149 lb 4 oz)   06/13/23 58.5 kg (129 lb)   04/14/23 65.3 kg (144 lb)   06/20/22 61.2 kg (135 lb)       Physical Exam:  Constitutional: Well-developed and well-nourished. Not diaphoretic. No distress.   Skin: Skin is warm and dry. No rash noted.  Head: Atraumatic without lesions.  Eyes: Conjunctivae and extraocular motions are normal. Pupils are equal, round, and reactive to light. No scleral icterus.   Ears:  External ears unremarkable. Tympanic membranes clear and intact.  Nose: Nares patent. Septum midline. Turbinates without erythema nor edema. No discharge.   Mouth/Throat: Dentition is intact. Tongue normal. Oropharynx is clear and moist. Posterior pharynx without erythema or exudates.  Neck: Supple, trachea midline. Normal range of motion. No thyromegaly present. No lymphadenopathy--cervical or supraclavicular.  Cardiovascular: Regular rate and rhythm, S1 and S2 without murmur, rubs, or gallops.  Lungs: Normal inspiratory effort, CTA bilaterally, no wheezes/rhonchi/rales  Abdomen: Soft, non tender, and without distention. Active bowel sounds in all four quadrants. No rebound, guarding, masses or HSM.  Extremities: No cyanosis, clubbing, erythema, nor edema. Distal pulses intact and symmetric.   Musculoskeletal: All major joints AROM full in all directions without pain.  Neurological: Alert and oriented x 3. DTRs 2+/3 and symmetric. No cranial nerve deficit. 5/5 myotomes. Sensation intact.   Psychiatric:  Behavior, mood, and affect are appropriate.      Assessment and Plan:     1. Wellness examination        2. Elevated LDL cholesterol level  Comp Metabolic Panel    Lipid Profile      3. Immunity status testing  HEP B CORE AB TOTAL    HEP B SURFACE AB    HEP B SURFACE ANTIGEN    VARICELLA ZOSTER IGG AB    " Quantiferon Gold TB (PPD)    MEASLES/MUMPS/RUBELLA IMMUNITY    MEASLES/MUMPS/RUBELLA IMMUNITY      4. Fatigue, unspecified type  Comp Metabolic Panel    Lipid Profile    TSH WITH REFLEX TO FT4      5. Genetic screening  Referral to Genetic Research Studies      6. Pregnancy, unspecified gestational age            HCM:  routine anticipatory guidance  Labs per orders  Immunizations per orders  Patient counseled about skin care, diet, supplements, prenatal vitamins, safe sex and exercise.      Follow-up: No follow-ups on file.

## 2024-01-10 LAB
GAMMA INTERFERON BACKGROUND BLD IA-ACNC: 0.04 IU/ML
M TB IFN-G BLD-IMP: NEGATIVE
M TB IFN-G CD4+ BCKGRND COR BLD-ACNC: 0.02 IU/ML
MITOGEN IGNF BCKGRD COR BLD-ACNC: >10 IU/ML
QFT TB2 - NIL TBQ2: 0 IU/ML

## 2024-01-12 ENCOUNTER — EH NON-PROVIDER (OUTPATIENT)
Dept: OCCUPATIONAL MEDICINE | Facility: CLINIC | Age: 29
End: 2024-01-12

## 2024-01-12 DIAGNOSIS — Z02.89 ENCOUNTER FOR OCCUPATIONAL HEALTH EXAMINATION INVOLVING RESPIRATOR: ICD-10-CM

## 2024-01-12 LAB
MEV IGG SER-ACNC: 20.8 AU/ML
MUV IGG SER IA-ACNC: <5 AU/ML
RUBV AB SER QL: 158 IU/ML
VZV IGG SER IA-ACNC: 601.7 IV

## 2024-01-12 PROCEDURE — 94375 RESPIRATORY FLOW VOLUME LOOP: CPT | Performed by: NURSE PRACTITIONER

## 2024-02-01 ENCOUNTER — HOSPITAL ENCOUNTER (EMERGENCY)
Facility: MEDICAL CENTER | Age: 29
End: 2024-02-01
Attending: OBSTETRICS & GYNECOLOGY | Admitting: OBSTETRICS & GYNECOLOGY
Payer: COMMERCIAL

## 2024-02-01 ENCOUNTER — APPOINTMENT (OUTPATIENT)
Dept: RADIOLOGY | Facility: MEDICAL CENTER | Age: 29
End: 2024-02-01
Attending: OBSTETRICS & GYNECOLOGY
Payer: COMMERCIAL

## 2024-02-01 VITALS
OXYGEN SATURATION: 96 % | HEIGHT: 61 IN | SYSTOLIC BLOOD PRESSURE: 103 MMHG | HEART RATE: 81 BPM | WEIGHT: 149 LBS | TEMPERATURE: 97.7 F | BODY MASS INDEX: 28.13 KG/M2 | DIASTOLIC BLOOD PRESSURE: 66 MMHG | RESPIRATION RATE: 19 BRPM

## 2024-02-01 LAB — A1 MICROGLOB PLACENTAL VAG QL: NEGATIVE

## 2024-02-01 PROCEDURE — 99284 EMERGENCY DEPT VISIT MOD MDM: CPT

## 2024-02-01 PROCEDURE — 59025 FETAL NON-STRESS TEST: CPT

## 2024-02-01 PROCEDURE — 76815 OB US LIMITED FETUS(S): CPT

## 2024-02-01 PROCEDURE — 84112 EVAL AMNIOTIC FLUID PROTEIN: CPT

## 2024-02-01 ASSESSMENT — FIBROSIS 4 INDEX: FIB4 SCORE: 1.362936671999871815

## 2024-02-01 ASSESSMENT — PAIN SCALES - GENERAL: PAINLEVEL: 0 - NO PAIN

## 2024-02-01 NOTE — PROGRESS NOTES
0859-Pt here from work with c/o some leaking. Pt states it happened last night but thought nothing of it, then it happened a few times this morning as well. Pt denies any regular contractions or bleeding and reports +FM. Pt placed on monitors (us and toco). POC discussed with pt. Pt denies any questions.   0910-Dr Thompson called,report given. Orders received. +  20-Orders reviewed with pt. Pt denies any questions. Sterile spec performed. No pooling seen. Normal vaginal discharge noted. Amnisure collected  09- Dr Pfeiffer on unit, MD updated. Orders received.   1112-US at bedside.  1130-US complete. Dr Pfeiffer updated.   1145-Dr Pfeiffer at bedside. POC discussed. Discharge order received. Discharge instructions and  labor precautions reviewed with pt. Pt denies any questions or concerns at this time. Pt discharged and cleared to go back to work

## 2024-02-01 NOTE — ED PROVIDER NOTES
"Department of Obstetrics and Gynecology  Labor and Delivery History and Physical  OB ED Note    Date of Admission: 2024     ID: 28 y.o.  with IUP at 32w4d. KEELY 3/24/24    Primary OB: Emil Hurtado M.D.     Attending OB: Ana Pfeiffer M.D.     CC: Was having some leaking this morning    HPI: Marilin Buck is a 28 y.o.  at 42 weeks and 4 days.  This is a patient of Dr. Multani's.  She has had an overall uncomplicated pregnancy.  NIPT was low risk.  She did have a history of fetal growth restriction with her first baby.  She also had a velamentous cord insertion about 1.  Ultrasound at 28 weeks estimated fetal weight was at the 40th percentile.  She is due for another ultrasound soon.  She presents complaining of this morning waking last night.  Again had similar this morning which made her nervous.  She denies contractions.  She reports that baby has been moving well.  She denies any bleeding.    ROS: 10 systems reviewed and negative except as noted above.    Obstetric History    -2022 vaginal delivery at 38 weeks 6 female 5 pounds 10 ounces        Past Medical History  Surgical History   Asthma   Sinus surgery x 2      Gynecologic History  Social History   Regular menses prior to pregnancy  No Hx of abnormal pap smears.  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     Current Outpatient Medications on File Prior to Encounter   Medication Sig Dispense Refill    hydrocortisone (ANUSOL-HC) 25 MG Suppos 1 SUPPOSITORY RECTAL ONCE A DAY 30 DAYS      Prenatal MV-Min-Fe Fum-FA-DHA (PRENATAL 1 PO) Take  by mouth.      Tetracyclines and Kenalog [triamcinolone]       O: /66   Pulse 81   Temp 36.5 °C (97.7 °F) (Temporal)   Resp 19   Ht 1.549 m (5' 1\")   Wt 67.6 kg (149 lb)   SpO2 96%   BMI 28.15 kg/m²     Gen: NAD, " AAO  Abd: Gravid, NTTP,Cephalic by Leopolds, No rebound or guarding  Ext: NTTP, no edema, 2+DPP  Pelvic: S sterile speculum exam done by RN and no pooling was seen.  Cervix was closed and long.    FHT: Baseline 120s to 130s with moderate variability, accelerations are present, there are no decelerations  [place]: CTX none noted    Labs:   Hospital Outpatient Visit on 01/09/2024   Component Date Value Ref Range Status    TSH 01/09/2024 1.470  0.380 - 5.330 uIU/mL Final    Comment: The 2011 American Thyroid Association (DEJAH) guidelines  recommended that the interpretation of thyroid function in  pregnancy be based on trimester specific reference ranges.    1st Trimester  0.100-2.500 mIU/L  2nd Trimester  0.200-3.000 mIU/L  3rd Trimester  0.300-3.500 mIU/L    These established reference ranges have not been validated  at [facility].      Varicella Zoster IgG Ab 01/09/2024 601.7  IV Final    Comment: INTERPRETIVE INFORMATION: VZV Ab, IgG  134.9 IV or less ....... Negative - No significant level of  detectable IgG varicella-zoster  antibody.  135.0 - 164.9 IV ....... Equivocal - Repeat testing in  10-14 days may be helpful.  165.0 IV or greater .... Positive - IgG antibody to  varicella-zoster detected, which  may indicate a current or past  varicella-zoster infection.  The best evidence for current infection is a significant change on  two appropriately timed specimens, where both tests are done in  the same laboratory at the same time.  Performed By: [facility]  [address] [place] [id number]  : Jared Badillo MD, PhD  CLIA Number: [id number]      QFT NIL 01/09/2024 0.04  IU/mL Final    QFT TB1 - NIL 01/09/2024 0.02  <=0.34 IU/mL Final    QFT TB2 - NIL 01/09/2024 0.00  <=0.34 IU/mL Final    QFT Mitogen - NIL 01/09/2024 >10.00  IU/mL Final    QFT Gold Plus 01/09/2024 Negative  Negative Final    Hepatitis B Core Ab, Total 01/09/2024 NonReactive  Non-Reactive Final     Comment: Antibodies to HBc were not detected.  The Roche HBc Total is a diagnostic test for the qualitative determination  of total antibodies to the hepatitis B core antigen in human serum and  plasma. The results should be used and interpreted only in the context of  the overall clinical picture. A negative test result does not exclude the  possibility of exposure to hepatitis B virus.  Note: Assay performance characteristics have not been established in  patients under the age of 21, pregnant women, or in populations of  immunocompromised or immunosuppressed patients.      Hep B Surface Antibody Quant 01/09/2024 6477.00 (H)  0.00 - 10.00 mIU/mL Final    Comment: Anti HBs concentration detected at > 10 mIU/mL. Individual is considered to  be immune to infection with HBV.  Results obtained by dilution.  Reference Interval: anti-HBs  < 8.5 mIU/mL..........Negative.  8.5 - 11.4 mIU/mL..........Indeterminate.  = 11.5 mIU/mL..........Positive.  Assay performance characteristics have not been established when the Elecsys  Anti HBs assay is used in conjunction with other manufacturers' assays for  specific HBV serological markers.  For post-vaccination antibody testing guidelines for the general public,  refer to MMWR December 23, 2005/Vol. 54 (No.16);1-23, and for healthcare  workers, refer to MMWR December 20, 2013/Vol.62(No. 10);1-19.      Hepatitis B Surface Antigen 01/09/2024 Non-Reactive  Non-Reactive Final    Comment: It has been reported that certain assays will not detect all HBV mutants.  If acute or chronic HBV infection is suspected and the HBsAg result is  negative, it is recommended that other serological markers be tested to  confirm the HBsAg nonreactivity.  HBsAg test results should always be  assessed in conjunction with the patient's medical history, clinical  examination, and other findings.    Note: Assay performance characteristics have not been established when the  Elecsys HBsAg II assay is used  in conjunction with other manufacturers'  assays for specific HBV serological markers. Assay performance  characteristics have not been established for testing of newborns.        Rubeola Igg Antibody 01/09/2024 20.8  AU/mL Final    Comment: INTERPRETIVE INFORMATION: Measles (Rubeola) Antibody, IgG  13.4 AU/mL or less........ Negative - No significant level  of detectable measles (rubeola)  IgG antibody.  13.5-16.4 AU/mL .......... Equivocal - Repeat testing in  10-14 days may be helpful.  16.5 AU/mL or greater .... Positive - IgG antibody to  measles (rubeola) detected  which may indicate a current  or past exposure/immunization  to measles (rubeola).  The best evidence for current infection is a significant change on  two appropriately timed specimens, where both tests are done in  the same laboratory at the same time.  Performed By: [facility]  [address] [place] [address]  : Jared Badillo MD, PhD  CLIA Number: [id number]      Mumps Ab Igg 01/09/2024 <5.0  AU/mL Final    Comment: INTERPRETIVE INFORMATION: Mumps Ab, IgG by JOON  8.9 AU/mL or less .... Negative - No significant level of  detectable IgG mumps virus antibody  9.0-10.9 AU/mL ....... Equivocal - Repeat testing in 10-14  days may be helpful  11.0 AU/mL or greater: Positive - IgG antibody to mumps  virus detected, which may indicate  a current or past exposure/  immunization to mumps virus.  The best evidence for current infection is a significant change on  two appropriately timed specimens, where both tests are done in  the same laboratory at the same time.  Performed By: [facility]  [address] [place] [address]  : Jared Badillo MD, PhD  CLIA Number: [id number]      Rubella IgG Antibody 01/09/2024 158.00  IU/mL Final    Comment: This assay is performed using electrochemiluminescence immunoassay  on Roche tj e immunoassay analyzers.  Interpretive Criteria:  < 10  IU/mL   Negative for anti-Rubella IgG  >= 10 IU/mL   Positive for anti-Rubella IgG  The presence of IgG antibodies to Rubella virus is an indication of previous  exposure either by prior infection or by vaccination. Results obtained with  the Elecsys Rubella IgG assay and cannot be used interchangeably with assays  from other manufacturers.       AmniSure negative.  Bedside ultrasound done and MICKEY is normal at 11.    A/P: Marilin Buck is a 28 y.o.  at 32 weeks 4 days here with complaints of leaking fluid.  Her examination was normal.  A AmniSure was collected and is negative.  An ultrasound with MICKEY is normal.  She has had no further leaking since she has been here.  Her fetal heart tracing is reactive and reassuring for appropriate and appropriate for gestational age.  She will be discharged home with precautions to return if leaking continues or if she has contractions or bleeding or decreased fetal movement.      Ana Pfeiffer M.D.,  2024, 11:42 AM

## 2024-02-26 ENCOUNTER — HOSPITAL ENCOUNTER (OUTPATIENT)
Dept: LAB | Facility: MEDICAL CENTER | Age: 29
End: 2024-02-26
Attending: OBSTETRICS & GYNECOLOGY
Payer: COMMERCIAL

## 2024-02-26 PROCEDURE — 87150 DNA/RNA AMPLIFIED PROBE: CPT

## 2024-02-26 PROCEDURE — 87081 CULTURE SCREEN ONLY: CPT

## 2024-02-28 LAB — GP B STREP DNA SPEC QL NAA+PROBE: NEGATIVE

## 2024-03-17 RX ORDER — HYDROCORTISONE ACETATE 25 MG/1
25 SUPPOSITORY RECTAL EVERY 12 HOURS PRN
Status: CANCELLED | OUTPATIENT
Start: 2024-03-17

## 2024-03-18 ENCOUNTER — ANESTHESIA (OUTPATIENT)
Dept: OBGYN | Facility: MEDICAL CENTER | Age: 29
End: 2024-03-18
Payer: COMMERCIAL

## 2024-03-18 ENCOUNTER — HOSPITAL ENCOUNTER (INPATIENT)
Facility: MEDICAL CENTER | Age: 29
LOS: 4 days | End: 2024-03-22
Attending: OBSTETRICS & GYNECOLOGY | Admitting: OBSTETRICS & GYNECOLOGY
Payer: COMMERCIAL

## 2024-03-18 ENCOUNTER — ANESTHESIA EVENT (OUTPATIENT)
Dept: OBGYN | Facility: MEDICAL CENTER | Age: 29
End: 2024-03-18
Payer: COMMERCIAL

## 2024-03-18 DIAGNOSIS — Z91.89 AT RISK FOR BREASTFEEDING DIFFICULTY: ICD-10-CM

## 2024-03-18 DIAGNOSIS — G89.18 POSTOPERATIVE PAIN: ICD-10-CM

## 2024-03-18 LAB
ABO GROUP BLD: NORMAL
BASOPHILS # BLD AUTO: 0.2 % (ref 0–1.8)
BASOPHILS # BLD AUTO: 0.3 % (ref 0–1.8)
BASOPHILS # BLD: 0.03 K/UL (ref 0–0.12)
BASOPHILS # BLD: 0.03 K/UL (ref 0–0.12)
BLD GP AB SCN SERPL QL: NORMAL
EOSINOPHIL # BLD AUTO: 0.28 K/UL (ref 0–0.51)
EOSINOPHIL # BLD AUTO: 0.38 K/UL (ref 0–0.51)
EOSINOPHIL NFR BLD: 1.7 % (ref 0–6.9)
EOSINOPHIL NFR BLD: 4 % (ref 0–6.9)
ERYTHROCYTE [DISTWIDTH] IN BLOOD BY AUTOMATED COUNT: 44.3 FL (ref 35.9–50)
ERYTHROCYTE [DISTWIDTH] IN BLOOD BY AUTOMATED COUNT: 45.1 FL (ref 35.9–50)
ERYTHROCYTE [DISTWIDTH] IN BLOOD BY AUTOMATED COUNT: 45.1 FL (ref 35.9–50)
HCT VFR BLD AUTO: 31.4 % (ref 37–47)
HCT VFR BLD AUTO: 33.5 % (ref 37–47)
HCT VFR BLD AUTO: 37.7 % (ref 37–47)
HGB BLD-MCNC: 10.7 G/DL (ref 12–16)
HGB BLD-MCNC: 11.3 G/DL (ref 12–16)
HGB BLD-MCNC: 13.2 G/DL (ref 12–16)
HOLDING TUBE BB 8507: NORMAL
IMM GRANULOCYTES # BLD AUTO: 0.06 K/UL (ref 0–0.11)
IMM GRANULOCYTES # BLD AUTO: 0.08 K/UL (ref 0–0.11)
IMM GRANULOCYTES NFR BLD AUTO: 0.5 % (ref 0–0.9)
IMM GRANULOCYTES NFR BLD AUTO: 0.6 % (ref 0–0.9)
LYMPHOCYTES # BLD AUTO: 1.98 K/UL (ref 1–4.8)
LYMPHOCYTES # BLD AUTO: 2.61 K/UL (ref 1–4.8)
LYMPHOCYTES NFR BLD: 12.3 % (ref 22–41)
LYMPHOCYTES NFR BLD: 27.2 % (ref 22–41)
MCH RBC QN AUTO: 31.8 PG (ref 27–33)
MCH RBC QN AUTO: 32 PG (ref 27–33)
MCH RBC QN AUTO: 32.1 PG (ref 27–33)
MCHC RBC AUTO-ENTMCNC: 33.7 G/DL (ref 32.2–35.5)
MCHC RBC AUTO-ENTMCNC: 34.1 G/DL (ref 32.2–35.5)
MCHC RBC AUTO-ENTMCNC: 35 G/DL (ref 32.2–35.5)
MCV RBC AUTO: 91.7 FL (ref 81.4–97.8)
MCV RBC AUTO: 94 FL (ref 81.4–97.8)
MCV RBC AUTO: 94.4 FL (ref 81.4–97.8)
MONOCYTES # BLD AUTO: 0.64 K/UL (ref 0–0.85)
MONOCYTES # BLD AUTO: 0.65 K/UL (ref 0–0.85)
MONOCYTES NFR BLD AUTO: 4 % (ref 0–13.4)
MONOCYTES NFR BLD AUTO: 6.8 % (ref 0–13.4)
NEUTROPHILS # BLD AUTO: 13.11 K/UL (ref 1.82–7.42)
NEUTROPHILS # BLD AUTO: 5.88 K/UL (ref 1.82–7.42)
NEUTROPHILS NFR BLD: 61.1 % (ref 44–72)
NEUTROPHILS NFR BLD: 81.3 % (ref 44–72)
NRBC # BLD AUTO: 0 K/UL
NRBC # BLD AUTO: 0 K/UL
NRBC BLD-RTO: 0 /100 WBC (ref 0–0.2)
NRBC BLD-RTO: 0 /100 WBC (ref 0–0.2)
PLATELET # BLD AUTO: 146 K/UL (ref 164–446)
PLATELET # BLD AUTO: 148 K/UL (ref 164–446)
PLATELET # BLD AUTO: 167 K/UL (ref 164–446)
PMV BLD AUTO: 10.9 FL (ref 9–12.9)
PMV BLD AUTO: 11 FL (ref 9–12.9)
PMV BLD AUTO: 11.6 FL (ref 9–12.9)
RBC # BLD AUTO: 3.34 M/UL (ref 4.2–5.4)
RBC # BLD AUTO: 3.55 M/UL (ref 4.2–5.4)
RBC # BLD AUTO: 4.11 M/UL (ref 4.2–5.4)
RH BLD: NORMAL
T PALLIDUM AB SER QL IA: NORMAL
WBC # BLD AUTO: 14.6 K/UL (ref 4.8–10.8)
WBC # BLD AUTO: 16.1 K/UL (ref 4.8–10.8)
WBC # BLD AUTO: 9.6 K/UL (ref 4.8–10.8)

## 2024-03-18 PROCEDURE — 700105 HCHG RX REV CODE 258: Performed by: ANESTHESIOLOGY

## 2024-03-18 PROCEDURE — 700105 HCHG RX REV CODE 258: Performed by: OBSTETRICS & GYNECOLOGY

## 2024-03-18 PROCEDURE — 36415 COLL VENOUS BLD VENIPUNCTURE: CPT

## 2024-03-18 PROCEDURE — 160047 HCHG PACU  - EA ADDL 30 MINS PHASE II: Performed by: OBSTETRICS & GYNECOLOGY

## 2024-03-18 PROCEDURE — 700111 HCHG RX REV CODE 636 W/ 250 OVERRIDE (IP): Mod: JZ | Performed by: ANESTHESIOLOGY

## 2024-03-18 PROCEDURE — 160002 HCHG RECOVERY MINUTES (STAT): Performed by: OBSTETRICS & GYNECOLOGY

## 2024-03-18 PROCEDURE — 700101 HCHG RX REV CODE 250: Performed by: OBSTETRICS & GYNECOLOGY

## 2024-03-18 PROCEDURE — A9270 NON-COVERED ITEM OR SERVICE: HCPCS | Performed by: OBSTETRICS & GYNECOLOGY

## 2024-03-18 PROCEDURE — 86850 RBC ANTIBODY SCREEN: CPT

## 2024-03-18 PROCEDURE — 86900 BLOOD TYPING SEROLOGIC ABO: CPT

## 2024-03-18 PROCEDURE — 700111 HCHG RX REV CODE 636 W/ 250 OVERRIDE (IP): Mod: JZ | Performed by: OBSTETRICS & GYNECOLOGY

## 2024-03-18 PROCEDURE — 160041 HCHG SURGERY MINUTES - EA ADDL 1 MIN LEVEL 4: Performed by: OBSTETRICS & GYNECOLOGY

## 2024-03-18 PROCEDURE — 160046 HCHG PACU - 1ST 60 MINS PHASE II: Performed by: OBSTETRICS & GYNECOLOGY

## 2024-03-18 PROCEDURE — 86901 BLOOD TYPING SEROLOGIC RH(D): CPT

## 2024-03-18 PROCEDURE — 85025 COMPLETE CBC W/AUTO DIFF WBC: CPT

## 2024-03-18 PROCEDURE — 160035 HCHG PACU - 1ST 60 MINS PHASE I: Performed by: OBSTETRICS & GYNECOLOGY

## 2024-03-18 PROCEDURE — 700102 HCHG RX REV CODE 250 W/ 637 OVERRIDE(OP): Performed by: ANESTHESIOLOGY

## 2024-03-18 PROCEDURE — A9270 NON-COVERED ITEM OR SERVICE: HCPCS | Performed by: ANESTHESIOLOGY

## 2024-03-18 PROCEDURE — 700102 HCHG RX REV CODE 250 W/ 637 OVERRIDE(OP): Performed by: OBSTETRICS & GYNECOLOGY

## 2024-03-18 PROCEDURE — 770002 HCHG ROOM/CARE - OB PRIVATE (112)

## 2024-03-18 PROCEDURE — 85027 COMPLETE CBC AUTOMATED: CPT

## 2024-03-18 PROCEDURE — 160029 HCHG SURGERY MINUTES - 1ST 30 MINS LEVEL 4: Performed by: OBSTETRICS & GYNECOLOGY

## 2024-03-18 PROCEDURE — 160048 HCHG OR STATISTICAL LEVEL 1-5: Performed by: OBSTETRICS & GYNECOLOGY

## 2024-03-18 PROCEDURE — 86780 TREPONEMA PALLIDUM: CPT

## 2024-03-18 PROCEDURE — C1755 CATHETER, INTRASPINAL: HCPCS | Performed by: OBSTETRICS & GYNECOLOGY

## 2024-03-18 PROCEDURE — 160025 RECOVERY II MINUTES (STATS): Performed by: OBSTETRICS & GYNECOLOGY

## 2024-03-18 PROCEDURE — 160009 HCHG ANES TIME/MIN: Performed by: OBSTETRICS & GYNECOLOGY

## 2024-03-18 RX ORDER — BUPIVACAINE HYDROCHLORIDE 7.5 MG/ML
INJECTION, SOLUTION INTRASPINAL PRN
Status: DISCONTINUED | OUTPATIENT
Start: 2024-03-18 | End: 2024-03-18 | Stop reason: SURG

## 2024-03-18 RX ORDER — OXYTOCIN 10 [USP'U]/ML
INJECTION, SOLUTION INTRAMUSCULAR; INTRAVENOUS PRN
Status: DISCONTINUED | OUTPATIENT
Start: 2024-03-18 | End: 2024-03-18 | Stop reason: SURG

## 2024-03-18 RX ORDER — SIMETHICONE 125 MG
125 TABLET,CHEWABLE ORAL 4 TIMES DAILY PRN
Status: DISCONTINUED | OUTPATIENT
Start: 2024-03-18 | End: 2024-03-22 | Stop reason: HOSPADM

## 2024-03-18 RX ORDER — ACETAMINOPHEN 500 MG
1000 TABLET ORAL EVERY 6 HOURS PRN
Status: DISCONTINUED | OUTPATIENT
Start: 2024-03-22 | End: 2024-03-22 | Stop reason: HOSPADM

## 2024-03-18 RX ORDER — KETOROLAC TROMETHAMINE 15 MG/ML
15 INJECTION, SOLUTION INTRAMUSCULAR; INTRAVENOUS EVERY 6 HOURS
Status: COMPLETED | OUTPATIENT
Start: 2024-03-18 | End: 2024-03-19

## 2024-03-18 RX ORDER — METOCLOPRAMIDE HYDROCHLORIDE 5 MG/ML
10 INJECTION INTRAMUSCULAR; INTRAVENOUS ONCE
Status: COMPLETED | OUTPATIENT
Start: 2024-03-18 | End: 2024-03-18

## 2024-03-18 RX ORDER — BISACODYL 10 MG
10 SUPPOSITORY, RECTAL RECTAL PRN
Status: COMPLETED | OUTPATIENT
Start: 2024-03-18 | End: 2024-03-20

## 2024-03-18 RX ORDER — ONDANSETRON 4 MG/1
4 TABLET, ORALLY DISINTEGRATING ORAL EVERY 6 HOURS PRN
Status: DISCONTINUED | OUTPATIENT
Start: 2024-03-19 | End: 2024-03-22 | Stop reason: HOSPADM

## 2024-03-18 RX ORDER — EPHEDRINE SULFATE 50 MG/ML
10 INJECTION, SOLUTION INTRAVENOUS
Status: ACTIVE | OUTPATIENT
Start: 2024-03-18 | End: 2024-03-19

## 2024-03-18 RX ORDER — HALOPERIDOL 5 MG/ML
1 INJECTION INTRAMUSCULAR
Status: DISCONTINUED | OUTPATIENT
Start: 2024-03-18 | End: 2024-03-18 | Stop reason: HOSPADM

## 2024-03-18 RX ORDER — HYDROMORPHONE HYDROCHLORIDE 1 MG/ML
0.4 INJECTION, SOLUTION INTRAMUSCULAR; INTRAVENOUS; SUBCUTANEOUS
Status: ACTIVE | OUTPATIENT
Start: 2024-03-18 | End: 2024-03-19

## 2024-03-18 RX ORDER — ACETAMINOPHEN 500 MG
1000 TABLET ORAL EVERY 6 HOURS
Status: COMPLETED | OUTPATIENT
Start: 2024-03-18 | End: 2024-03-19

## 2024-03-18 RX ORDER — KETOROLAC TROMETHAMINE 15 MG/ML
INJECTION, SOLUTION INTRAMUSCULAR; INTRAVENOUS PRN
Status: DISCONTINUED | OUTPATIENT
Start: 2024-03-18 | End: 2024-03-18 | Stop reason: SURG

## 2024-03-18 RX ORDER — MIDAZOLAM HYDROCHLORIDE 1 MG/ML
1 INJECTION INTRAMUSCULAR; INTRAVENOUS
Status: DISCONTINUED | OUTPATIENT
Start: 2024-03-18 | End: 2024-03-18 | Stop reason: HOSPADM

## 2024-03-18 RX ORDER — DIPHENHYDRAMINE HYDROCHLORIDE 50 MG/ML
12.5 INJECTION INTRAMUSCULAR; INTRAVENOUS EVERY 6 HOURS PRN
Status: ACTIVE | OUTPATIENT
Start: 2024-03-18 | End: 2024-03-19

## 2024-03-18 RX ORDER — OXYCODONE HCL 5 MG/5 ML
5 SOLUTION, ORAL ORAL
Status: DISCONTINUED | OUTPATIENT
Start: 2024-03-18 | End: 2024-03-18 | Stop reason: HOSPADM

## 2024-03-18 RX ORDER — ONDANSETRON 2 MG/ML
4 INJECTION INTRAMUSCULAR; INTRAVENOUS
Status: DISCONTINUED | OUTPATIENT
Start: 2024-03-18 | End: 2024-03-18 | Stop reason: HOSPADM

## 2024-03-18 RX ORDER — ACETAMINOPHEN 500 MG
1000 TABLET ORAL EVERY 6 HOURS
Qty: 24 TABLET | Refills: 0 | Status: COMPLETED | OUTPATIENT
Start: 2024-03-19 | End: 2024-03-22

## 2024-03-18 RX ORDER — CEFAZOLIN SODIUM 1 G/3ML
2 INJECTION, POWDER, FOR SOLUTION INTRAMUSCULAR; INTRAVENOUS ONCE
Status: DISCONTINUED | OUTPATIENT
Start: 2024-03-18 | End: 2024-03-18 | Stop reason: HOSPADM

## 2024-03-18 RX ORDER — SODIUM CHLORIDE, SODIUM LACTATE, POTASSIUM CHLORIDE, CALCIUM CHLORIDE 600; 310; 30; 20 MG/100ML; MG/100ML; MG/100ML; MG/100ML
INJECTION, SOLUTION INTRAVENOUS ONCE
Status: COMPLETED | OUTPATIENT
Start: 2024-03-18 | End: 2024-03-18

## 2024-03-18 RX ORDER — CITRIC ACID/SODIUM CITRATE 334-500MG
30 SOLUTION, ORAL ORAL ONCE
Status: COMPLETED | OUTPATIENT
Start: 2024-03-18 | End: 2024-03-18

## 2024-03-18 RX ORDER — SODIUM CHLORIDE, SODIUM LACTATE, POTASSIUM CHLORIDE, CALCIUM CHLORIDE 600; 310; 30; 20 MG/100ML; MG/100ML; MG/100ML; MG/100ML
INJECTION, SOLUTION INTRAVENOUS PRN
Status: DISCONTINUED | OUTPATIENT
Start: 2024-03-18 | End: 2024-03-22 | Stop reason: HOSPADM

## 2024-03-18 RX ORDER — SODIUM CHLORIDE, SODIUM LACTATE, POTASSIUM CHLORIDE, CALCIUM CHLORIDE 600; 310; 30; 20 MG/100ML; MG/100ML; MG/100ML; MG/100ML
INJECTION, SOLUTION INTRAVENOUS CONTINUOUS
Status: DISCONTINUED | OUTPATIENT
Start: 2024-03-18 | End: 2024-03-18

## 2024-03-18 RX ORDER — HYDROMORPHONE HYDROCHLORIDE 1 MG/ML
0.2 INJECTION, SOLUTION INTRAMUSCULAR; INTRAVENOUS; SUBCUTANEOUS
Status: ACTIVE | OUTPATIENT
Start: 2024-03-18 | End: 2024-03-19

## 2024-03-18 RX ORDER — MEPERIDINE HYDROCHLORIDE 25 MG/ML
12.5 INJECTION INTRAMUSCULAR; INTRAVENOUS; SUBCUTANEOUS
Status: DISCONTINUED | OUTPATIENT
Start: 2024-03-18 | End: 2024-03-18 | Stop reason: HOSPADM

## 2024-03-18 RX ORDER — VITAMIN A ACETATE, BETA CAROTENE, ASCORBIC ACID, CHOLECALCIFEROL, .ALPHA.-TOCOPHEROL ACETATE, DL-, THIAMINE MONONITRATE, RIBOFLAVIN, NIACINAMIDE, PYRIDOXINE HYDROCHLORIDE, FOLIC ACID, CYANOCOBALAMIN, CALCIUM CARBONATE, FERROUS FUMARATE, ZINC OXIDE, CUPRIC OXIDE 3080; 12; 120; 400; 1; 1.84; 3; 20; 22; 920; 25; 200; 27; 10; 2 [IU]/1; UG/1; MG/1; [IU]/1; MG/1; MG/1; MG/1; MG/1; MG/1; [IU]/1; MG/1; MG/1; MG/1; MG/1; MG/1
1 TABLET, FILM COATED ORAL
Status: DISCONTINUED | OUTPATIENT
Start: 2024-03-19 | End: 2024-03-22 | Stop reason: HOSPADM

## 2024-03-18 RX ORDER — CARBOPROST TROMETHAMINE 250 UG/ML
250 INJECTION, SOLUTION INTRAMUSCULAR
Status: DISCONTINUED | OUTPATIENT
Start: 2024-03-18 | End: 2024-03-22 | Stop reason: HOSPADM

## 2024-03-18 RX ORDER — IBUPROFEN 800 MG/1
800 TABLET ORAL EVERY 8 HOURS
Qty: 9 TABLET | Refills: 0 | Status: DISCONTINUED | OUTPATIENT
Start: 2024-03-19 | End: 2024-03-22 | Stop reason: HOSPADM

## 2024-03-18 RX ORDER — CEFAZOLIN SODIUM 1 G/3ML
INJECTION, POWDER, FOR SOLUTION INTRAMUSCULAR; INTRAVENOUS PRN
Status: DISCONTINUED | OUTPATIENT
Start: 2024-03-18 | End: 2024-03-18 | Stop reason: SURG

## 2024-03-18 RX ORDER — ONDANSETRON 2 MG/ML
INJECTION INTRAMUSCULAR; INTRAVENOUS PRN
Status: DISCONTINUED | OUTPATIENT
Start: 2024-03-18 | End: 2024-03-18 | Stop reason: SURG

## 2024-03-18 RX ORDER — DOCUSATE SODIUM 100 MG/1
100 CAPSULE, LIQUID FILLED ORAL 2 TIMES DAILY PRN
Status: DISCONTINUED | OUTPATIENT
Start: 2024-03-18 | End: 2024-03-22 | Stop reason: HOSPADM

## 2024-03-18 RX ORDER — OXYCODONE HYDROCHLORIDE 10 MG/1
10 TABLET ORAL EVERY 4 HOURS PRN
Status: DISCONTINUED | OUTPATIENT
Start: 2024-03-19 | End: 2024-03-22 | Stop reason: HOSPADM

## 2024-03-18 RX ORDER — DIPHENHYDRAMINE HYDROCHLORIDE 50 MG/ML
25 INJECTION INTRAMUSCULAR; INTRAVENOUS EVERY 6 HOURS PRN
Status: DISCONTINUED | OUTPATIENT
Start: 2024-03-19 | End: 2024-03-22 | Stop reason: HOSPADM

## 2024-03-18 RX ORDER — SODIUM CHLORIDE, SODIUM LACTATE, POTASSIUM CHLORIDE, CALCIUM CHLORIDE 600; 310; 30; 20 MG/100ML; MG/100ML; MG/100ML; MG/100ML
INJECTION, SOLUTION INTRAVENOUS CONTINUOUS
Status: DISCONTINUED | OUTPATIENT
Start: 2024-03-18 | End: 2024-03-22 | Stop reason: HOSPADM

## 2024-03-18 RX ORDER — OXYCODONE HYDROCHLORIDE 5 MG/1
5 TABLET ORAL EVERY 4 HOURS PRN
Status: DISPENSED | OUTPATIENT
Start: 2024-03-18 | End: 2024-03-19

## 2024-03-18 RX ORDER — DIPHENHYDRAMINE HYDROCHLORIDE 50 MG/ML
12.5 INJECTION INTRAMUSCULAR; INTRAVENOUS
Status: COMPLETED | OUTPATIENT
Start: 2024-03-18 | End: 2024-03-18

## 2024-03-18 RX ORDER — ONDANSETRON 2 MG/ML
4 INJECTION INTRAMUSCULAR; INTRAVENOUS EVERY 6 HOURS PRN
Status: DISCONTINUED | OUTPATIENT
Start: 2024-03-19 | End: 2024-03-22 | Stop reason: HOSPADM

## 2024-03-18 RX ORDER — MISOPROSTOL 200 UG/1
800 TABLET ORAL
Status: DISCONTINUED | OUTPATIENT
Start: 2024-03-18 | End: 2024-03-22 | Stop reason: HOSPADM

## 2024-03-18 RX ORDER — SODIUM CHLORIDE, SODIUM GLUCONATE, SODIUM ACETATE, POTASSIUM CHLORIDE AND MAGNESIUM CHLORIDE 526; 502; 368; 37; 30 MG/100ML; MG/100ML; MG/100ML; MG/100ML; MG/100ML
1500 INJECTION, SOLUTION INTRAVENOUS ONCE
Status: COMPLETED | OUTPATIENT
Start: 2024-03-18 | End: 2024-03-18

## 2024-03-18 RX ORDER — DIPHENHYDRAMINE HYDROCHLORIDE 50 MG/ML
25 INJECTION INTRAMUSCULAR; INTRAVENOUS EVERY 6 HOURS PRN
Status: ACTIVE | OUTPATIENT
Start: 2024-03-18 | End: 2024-03-19

## 2024-03-18 RX ORDER — OXYCODONE HYDROCHLORIDE 5 MG/1
5 TABLET ORAL EVERY 4 HOURS PRN
Status: DISCONTINUED | OUTPATIENT
Start: 2024-03-19 | End: 2024-03-22 | Stop reason: HOSPADM

## 2024-03-18 RX ORDER — OXYCODONE HYDROCHLORIDE 10 MG/1
10 TABLET ORAL EVERY 4 HOURS PRN
Status: ACTIVE | OUTPATIENT
Start: 2024-03-18 | End: 2024-03-19

## 2024-03-18 RX ORDER — MISOPROSTOL 200 UG/1
800 TABLET ORAL ONCE
Status: COMPLETED | OUTPATIENT
Start: 2024-03-18 | End: 2024-03-18

## 2024-03-18 RX ORDER — IBUPROFEN 800 MG/1
800 TABLET ORAL EVERY 8 HOURS PRN
Status: DISCONTINUED | OUTPATIENT
Start: 2024-03-22 | End: 2024-03-22 | Stop reason: HOSPADM

## 2024-03-18 RX ORDER — CALCIUM CARBONATE 500 MG/1
1000 TABLET, CHEWABLE ORAL EVERY 6 HOURS PRN
Status: DISCONTINUED | OUTPATIENT
Start: 2024-03-18 | End: 2024-03-22 | Stop reason: HOSPADM

## 2024-03-18 RX ORDER — OXYCODONE HCL 5 MG/5 ML
10 SOLUTION, ORAL ORAL
Status: DISCONTINUED | OUTPATIENT
Start: 2024-03-18 | End: 2024-03-18 | Stop reason: HOSPADM

## 2024-03-18 RX ORDER — DIPHENHYDRAMINE HCL 25 MG
25 TABLET ORAL EVERY 6 HOURS PRN
Status: DISCONTINUED | OUTPATIENT
Start: 2024-03-19 | End: 2024-03-22 | Stop reason: HOSPADM

## 2024-03-18 RX ORDER — MORPHINE SULFATE 0.5 MG/ML
INJECTION, SOLUTION EPIDURAL; INTRATHECAL; INTRAVENOUS PRN
Status: DISCONTINUED | OUTPATIENT
Start: 2024-03-18 | End: 2024-03-18 | Stop reason: SURG

## 2024-03-18 RX ORDER — OXYTOCIN 10 [USP'U]/ML
10 INJECTION, SOLUTION INTRAMUSCULAR; INTRAVENOUS
Status: DISCONTINUED | OUTPATIENT
Start: 2024-03-18 | End: 2024-03-22 | Stop reason: HOSPADM

## 2024-03-18 RX ORDER — METHYLERGONOVINE MALEATE 0.2 MG/ML
0.2 INJECTION INTRAVENOUS
Status: DISCONTINUED | OUTPATIENT
Start: 2024-03-18 | End: 2024-03-22 | Stop reason: HOSPADM

## 2024-03-18 RX ADMIN — TRANEXAMIC ACID 1000 MG: 100 INJECTION, SOLUTION INTRAVENOUS at 11:22

## 2024-03-18 RX ADMIN — METOCLOPRAMIDE 10 MG: 5 INJECTION, SOLUTION INTRAMUSCULAR; INTRAVENOUS at 07:13

## 2024-03-18 RX ADMIN — MORPHINE SULFATE 0.15 MG: 0.5 INJECTION, SOLUTION EPIDURAL; INTRATHECAL; INTRAVENOUS at 08:26

## 2024-03-18 RX ADMIN — FAMOTIDINE 20 MG: 10 INJECTION, SOLUTION INTRAVENOUS at 07:16

## 2024-03-18 RX ADMIN — KETOROLAC TROMETHAMINE 15 MG: 15 INJECTION, SOLUTION INTRAMUSCULAR; INTRAVENOUS at 21:48

## 2024-03-18 RX ADMIN — DIPHENHYDRAMINE HYDROCHLORIDE 12.5 MG: 50 INJECTION, SOLUTION INTRAMUSCULAR; INTRAVENOUS at 11:51

## 2024-03-18 RX ADMIN — DIPHENHYDRAMINE HYDROCHLORIDE 12.5 MG: 50 INJECTION, SOLUTION INTRAMUSCULAR; INTRAVENOUS at 10:37

## 2024-03-18 RX ADMIN — BUPIVACAINE HYDROCHLORIDE IN DEXTROSE 15 MG: 7.5 INJECTION, SOLUTION SUBARACHNOID at 08:26

## 2024-03-18 RX ADMIN — SODIUM CHLORIDE, POTASSIUM CHLORIDE, SODIUM LACTATE AND CALCIUM CHLORIDE: 600; 310; 30; 20 INJECTION, SOLUTION INTRAVENOUS at 16:02

## 2024-03-18 RX ADMIN — DOCUSATE SODIUM 100 MG: 100 CAPSULE, LIQUID FILLED ORAL at 16:08

## 2024-03-18 RX ADMIN — KETOROLAC TROMETHAMINE 15 MG: 15 INJECTION, SOLUTION INTRAMUSCULAR; INTRAVENOUS at 09:07

## 2024-03-18 RX ADMIN — SODIUM CITRATE AND CITRIC ACID MONOHYDRATE 30 ML: 334; 500 SOLUTION ORAL at 07:19

## 2024-03-18 RX ADMIN — KETOROLAC TROMETHAMINE 15 MG: 15 INJECTION, SOLUTION INTRAMUSCULAR; INTRAVENOUS at 15:46

## 2024-03-18 RX ADMIN — MISOPROSTOL 800 MCG: 200 TABLET ORAL at 10:40

## 2024-03-18 RX ADMIN — OXYCODONE 5 MG: 5 TABLET ORAL at 21:47

## 2024-03-18 RX ADMIN — OXYTOCIN 10 UNITS: 10 INJECTION, SOLUTION INTRAMUSCULAR; INTRAVENOUS at 10:36

## 2024-03-18 RX ADMIN — OXYTOCIN 125 ML/HR: 10 INJECTION, SOLUTION INTRAMUSCULAR; INTRAVENOUS at 11:35

## 2024-03-18 RX ADMIN — SODIUM CHLORIDE, POTASSIUM CHLORIDE, SODIUM LACTATE AND CALCIUM CHLORIDE: 600; 310; 30; 20 INJECTION, SOLUTION INTRAVENOUS at 08:13

## 2024-03-18 RX ADMIN — SODIUM CHLORIDE, SODIUM GLUCONATE, SODIUM ACETATE, POTASSIUM CHLORIDE AND MAGNESIUM CHLORIDE 1000 ML: 526; 502; 368; 37; 30 INJECTION, SOLUTION INTRAVENOUS at 07:12

## 2024-03-18 RX ADMIN — ACETAMINOPHEN 1000 MG: 500 TABLET, FILM COATED ORAL at 12:06

## 2024-03-18 RX ADMIN — ACETAMINOPHEN 1000 MG: 500 TABLET, FILM COATED ORAL at 18:32

## 2024-03-18 RX ADMIN — CEFAZOLIN 2 G: 1 INJECTION, POWDER, FOR SOLUTION INTRAMUSCULAR; INTRAVENOUS at 08:29

## 2024-03-18 RX ADMIN — PHENYLEPHRINE HYDROCHLORIDE 0.2 MCG/KG/MIN: 10 INJECTION INTRAVENOUS at 08:27

## 2024-03-18 RX ADMIN — OXYTOCIN 20 UNITS: 10 INJECTION, SOLUTION INTRAMUSCULAR; INTRAVENOUS at 08:39

## 2024-03-18 RX ADMIN — ONDANSETRON 4 MG: 2 INJECTION INTRAMUSCULAR; INTRAVENOUS at 08:52

## 2024-03-18 ASSESSMENT — LIFESTYLE VARIABLES
HAVE YOU EVER FELT YOU SHOULD CUT DOWN ON YOUR DRINKING: NO
EVER HAD A DRINK FIRST THING IN THE MORNING TO STEADY YOUR NERVES TO GET RID OF A HANGOVER: NO
EVER_SMOKED: NEVER
TOTAL SCORE: 0
HAVE PEOPLE ANNOYED YOU BY CRITICIZING YOUR DRINKING: NO
TOTAL SCORE: 0
HOW MANY TIMES IN THE PAST YEAR HAVE YOU HAD 5 OR MORE DRINKS IN A DAY: 0
ON A TYPICAL DAY WHEN YOU DRINK ALCOHOL HOW MANY DRINKS DO YOU HAVE: 0
AVERAGE NUMBER OF DAYS PER WEEK YOU HAVE A DRINK CONTAINING ALCOHOL: 0
DOES PATIENT WANT TO STOP DRINKING: NO
ALCOHOL_USE: NO
EVER FELT BAD OR GUILTY ABOUT YOUR DRINKING: NO
CONSUMPTION TOTAL: NEGATIVE
TOTAL SCORE: 0

## 2024-03-18 ASSESSMENT — PAIN DESCRIPTION - PAIN TYPE
TYPE: ACUTE PAIN
TYPE: ACUTE PAIN
TYPE: ACUTE PAIN;CHRONIC PAIN
TYPE: SURGICAL PAIN
TYPE: SURGICAL PAIN

## 2024-03-18 ASSESSMENT — PAIN SCALES - GENERAL
PAINLEVEL: 0 - NO PAIN
PAIN_LEVEL: 0

## 2024-03-18 ASSESSMENT — PATIENT HEALTH QUESTIONNAIRE - PHQ9
2. FEELING DOWN, DEPRESSED, IRRITABLE, OR HOPELESS: NOT AT ALL
1. LITTLE INTEREST OR PLEASURE IN DOING THINGS: NOT AT ALL
SUM OF ALL RESPONSES TO PHQ9 QUESTIONS 1 AND 2: 0

## 2024-03-18 ASSESSMENT — FIBROSIS 4 INDEX: FIB4 SCORE: 1.362936671999871815

## 2024-03-18 NOTE — OP REPORT
OBSTETRICS AND GYNECOLOGY   Surgical Operative Note    Patient: Marilin Buck   Date: 3/18/2024  Surgeon: Emil Hurtado MD  Assistant: Rajan Pulido MD  Pre-op diagnosis:   1. 28 y.o.  with intrauterine pregnancy at 39w1d.  2.  on maternal demand in the setting of severe external hemorrhoids that she is highly concerned we will develop further  3.  History of FGR in prior pregnancy, normally grown this pregnancy, 40th percentile at last ultrasound.  Post-op diagnosis: same, delivered  Procedure: Primary low-transverse  section via Pfannenstiel with double layer uterine closure  Anesthesia: Spinal  Anesthesiologist: Anesthesiologist: Marcelo Noble M.D.  Pre-op Abx: 2 g cefazolin IV  Findings:  1. Viable male infant in cephalic presentation.  2. Delivered at 8:39 AM  on 3/18/2024 .  3. APGARs 8 at 1 minute and 9 at 5 minutes  4. Birth 2.92 kg (6 lb 7 oz)   5. Normal appearing uterus, fallopian tubes and ovaries.   6. Clear amniotic fluid.  7. Normal appearing placenta, 3VC with central cord insertion.  8.  Nuchal cord x 1.  EBL: 600 mL  UOP: 1000 mL, clear, straw-colored  Fluids: 178 mL of crystaloid  Specimens/Cultures: None  Drains/Retained Objects: garces catheter  Complications: None  Condition: Good  Disposition: L&D PACU then Mother/Baby Unit  Narrative:    After having reviewed, in detail, the risks, benefits, and alternatives to  delivery, the patient signed the informed consent and wished to proceed with the procedure.  The patient was taken to the operating room with an IV in place.  She was administered spinal anesthesia without complication.  She was placed on the operating table in the dorsal supine position with a leftward tilt.  Under a sterile prep, the patient had a Garces catheter placed in her bladder and sequential compression devices placed on her legs for venous thrombotic prophylaxis.  She was prepped and draped in the usual sterile fashion for a   delivery.  The adequacy of the patient's analgesia was checked with an Allis test.  The patient was adequately anesthetized.     A Pfannenstiel skin incision was made across the lower abdomen sharply with the scalpel.  This was carried down to the underlying layer of fascia with the Bovie.  The fascia was incised on either side of the midline.  This incision was then extended laterally on dissection.  The inferior and superior aspects of the fascial incision were grasped digitally and by axial stretch  from the underlying rectus muscles.  The rectus muscles were spread laterally from the midline.   The peritoneum was identified and entered using blunt dissection.      This incision was extended superiorly and inferiorly with good visualization of the bladder using blunt dissection, sharp dissection and with the Bovie.  The Jeff-O self retaining retractor was placed, ensuring that no maternal tissues were trapped between the inner ring and the abdominal wall.  The uterine peritoneum was grasped with smooth pickups and incised sharply with the Metzenbaum scissors.  This incision was extended superolaterally with the Metzenbaum scissors.  A bladder flap was created digitally.      A low transverse hysterotomy was then made sharply with a scalpel to the level of the membranes.  This incision was extended superolaterally with blunt dissection.  The membranes were ruptured and the fluid character was noted as above.  The fetal head was then grasped and brought atraumatically through the incision.  This was followed by the remainder of the infant, also atraumatically.  The infant was delivered in whole atraumatically.  The nose and mouth were suctioned with the bulb suction.  After 30 seconds of delayed cord clamping the cord was doubly clamped and cut.  The infant was then handed off to the awaiting pediatric resuscitation team including a respiratory therapist.      The placenta was then delivered  spontaneously aided by manual expression from the uterus.  The uterus was then exteriorized and wrapped in a moist lap sponge.  The uterus was cleared of all clot and debris with a lap sponge.  The hysterotomy was inspected and no extensions were noted.  The hysterotomy was then closed in a running locked fashion with 0 Vicryl.  A second layer of the same suture was then used to create an imbricating layer.   A survey of the hysterotomy revealed there was excellent hemostasis.   The pelvis was irrigated with warm, normal saline and suctioned clear.   Hemostasis was again confirmed. The uterus, tubes, and ovaries were then inspected with the above-findings.  The pelvis was irrigated with warm, normal saline and suctioned clear a final time and another survey of the hysterotomy revealed that was excellent hemostasis was again present.       The Jeff-O retractor was removed.  The peritoneum was closed with a running stitch of 3-0 Vicryl. The subfascial structures were inspected for bleeding and small bleeding areas were rendered hemostatic with the Bovie.  No other defects were identified.  The fascia itself was inspected for bleeding and buttonhole defects, and none were present.  As such, the fascia was then closed with 0 PDS in a running fashion.  The subcutaneous tissues were irrigated with warm, normal saline and dried with a new lap sponge.  They were then inspected for bleeding and small bleeding vessels were rendered hemostatic with the Bovie.  A subcutaneous layer of 2-0 Vicryl was then placed in a running fashion to minimize seroma formation.  The skin was then closed with 3-0 Monocryl in a running fashion.      The patient tolerated the procedure well.  Complications are as noted above.  Sponge, lap, needle, and instrument counts were reported to be correct.  The patient was taken to the recovery room in stable and awake condition with later plans to transfer to Maternity.  Dr. Hurtado was present throughout  and performed the entire procedure.       Emil Hurtado MD, MS, FACOG   3/18/2024, 9:20 AM     [facility]   [phone]

## 2024-03-18 NOTE — ANESTHESIA PROCEDURE NOTES
Spinal Block    Date/Time: 3/18/2024 8:17 AM    Performed by: Marcelo Noble M.D.  Authorized by: Marcelo Noble M.D.    Patient Location:  OR  Start Time:  3/18/2024 8:17 AM  End Time:  3/18/2024 8:26 AM  Reason for Block: primary anesthetic    patient identified, IV checked, site marked, risks and benefits discussed, surgical consent, monitors and equipment checked, pre-op evaluation and timeout performed    Patient Position:  Sitting  Prep: ChloraPrep, patient draped and sterile technique    Monitoring:  Blood pressure, continuous pulse oximetry and heart rate  Approach:  Midline  Location:  L3-4  Injection Technique:  Single-shot  Skin infiltration:  Lidocaine  Strength:  1%  Dose:  3ml  Needle Type:  Pencan  Needle Gauge:  25 G  CSF flowing pre/post injection:  Yes  Sensory Level:  T4

## 2024-03-18 NOTE — ANESTHESIA PREPROCEDURE EVALUATION
Case: [id number] Date: 24    Procedure: PRIMARY  SECTION    Pre-op diagnosis:  SECTION ON DEMAND - 39.1 WEEKS    Location: SURGERY LABOR AND DELIVERY    Surgeons: Emil Hurtado M.D.            Relevant Problems   PULMONARY   (positive) Exercise-induced asthma      CARDIAC   (positive) Grade I hemorrhoids       Physical Exam    Airway   Mallampati: II  TM distance: >3 FB  Neck ROM: full       Cardiovascular - normal exam  Rhythm: regular  Rate: normal  (-) murmur     Dental - normal exam           Pulmonary - normal exam  Breath sounds clear to auscultation     Abdominal    Neurological - normal exam               Anesthesia Plan    ASA 2       Plan - spinal   Neuraxial block will be primary anesthetic                Postoperative Plan: Postoperative administration of opioids is intended.    Pertinent diagnostic labs and testing reviewed    Informed Consent:    Anesthetic plan and risks discussed with patient.

## 2024-03-18 NOTE — H&P
OBSTETRICS AND GYNECOLOGY  Labor and Delivery  History and Physical    Date of Preop Visit: 3/15/2024    Date of Admission: 3/18/2024     ID: 28 y.o.  with IUP at 39w1d on date of admission    Attending OB: Emil Hurtado M.D.    CC: Scheduled primary low transverse  section    HPI: Marilin Buck is a 28 y.o.  at 39w1d on date of admission by LMP c/w 7 wk U/S, who presents for scheduled primary low transverse  section for maternal demand in the setting of severe external hemorrhoids that she is concerned we will develop further.  We discussed the risks to  delivery versus vaginal delivery.  She is a candidate for vaginal delivery, but is at risk for worsening of the hemorrhoids in the interim.  She is also at risk for the complications of  delivery as noted below.  These Consents were discussed at length with the patient both verbally and in written form and she was able to study and signed in our clinic.  She understands her options fully and very clearly desires primary  delivery.  The patient states she is doing well at her preop visit. She endorses good fetal movement, and denies loss of fluid, vaginal bleeding, or decreased fetal movement.  The patient was followed with growth ultrasounds in this pregnancy given her history of fetal growth restriction in her last pregnancy.  Last U/S 39.6%ile at 36wk.     ROS: 10 systems reviewed and negative except as noted above.    Obstetric History   OB History    Para Term  AB Living   2 1 1     1   SAB IAB Ectopic Molar Multiple Live Births           0 1      # Outcome Date GA Lbr Gurdeep/2nd Weight Sex Delivery Anes PTL Lv   2 Current            1 Term 22 38w6d 07:32 / 00:28 2.56 kg (5 lb 10.3 oz) F Vag-Spont Local  MAX   1: Fetal growth restriction      Past Medical History  Surgical History   Asthma  Fetal growth restriction in first pregnancy   Sinus surgery      Gynecologic History   Social History   Regular menses prior to pregnancy  Denies Hx of abnormal pap smears.  Denies Hx of STIs, And specifically denies a history of HSV Tobacco: Denies  EtOH: Denies  [employer]: [employer]  Works as a NICU RN here at [employer]      Medications  Allergies   No current facility-administered medications on file prior to encounter.     Current Outpatient Medications on File Prior to Encounter   Medication Sig Dispense Refill    hydrocortisone (ANUSOL-HC) 25 MG Suppos 1 SUPPOSITORY RECTAL ONCE A DAY 30 DAYS      Prenatal MV-Min-Fe Fum-FA-DHA (PRENATAL 1 PO) Take  by mouth.      Tetracyclines and Kenalog [triamcinolone]     Family Medical History   Noncontributory       O: Gen: NAD, AAO  Abd: Gravid, NTTP,Cephalic by Leopolds, No rebound or guarding  Ext: NTTP, tr edema, 2+DPP  Pelvic: SVE Deferred    FHT:  150bpm by doppler    Prenatal labs:   Lab  Result    Rh  positive    Antibody screen  negative    Rubella  immune    HIV  Non-reactive    RPR  Non-reactive    HBsAg  negative    Varicella  immune    Urine Culture  No growth    Gonorrhea/chlamydia/Trich  neg/neg/neg    Genetic screening  cffDNA aneuploidy screening low risk, EIF noted on ultrasonography, resolved at 28 weeks.    1h Glucose  96    GBS  negative       A/P: Marilin Buck is a 28 y.o.  at 39w1d on date of admission by LMP c/w 7wk U/S who presents for scheduled primary low transverse  section on maternal demand over concerns about her severe hemorrhoids.  AVSS.    *Admit to L&D  *IV, CBC, T&S on hold  *Anesthesia consult  *2g ancef prior to incision  *Hospital surgical consent  *NPO  *Global: Rh+, RubImm, VarImm, GBS neg.     Discussed with the patient indications for  delivery. The patient voiced understanding of indications for  section at this time.    Discussed with the patient the risks of  delivery. Our conversation included the risks of such major surgery include which included but was not limited to  infection, bleeding, blood clots, scarring, damage to other organs in the area of operation. Specifically organs that can be damaged are bowel, bladder, blood vessels, nerves, uterus, fallopian tubes, ovaries and ureters.  I also discussed with the patient the risk of wound infection and wound breakdown. I explaned that these risks are greater in people that have diabetes or obesity.  We discussed the risk of emergency blood transfusion during procedure as well as emergency hysterectomy during procedure.  We also discussed the increased risk abnormal placental implantation and possible need for repeat  section in future pregnancies.  The patient verbalized her understanding of these risks.    Patient had the opportunity to ask questions regarding procedures. All questions answered to the patient's satisfaction.    Proceed with  delivery.      Emil Hurtado MD, MS, FACOG   3/17/2024, 10:50 PM     [facility]   [phone]

## 2024-03-18 NOTE — PROGRESS NOTES
Patient transferred to postpartum unit at 1300. Received report from LENO Gottlieb. Orieneted patient to room and discussed POC for MOB and infant. Patient was assessed, fundus firm, lochia light, rubra. Patient educated to call for assistance with ambulation. Call light within reach, bed in lowest position. Patient denies any needs at this time. Encouraged to call if any needs arise.

## 2024-03-18 NOTE — PROGRESS NOTES
0636- Received report. Assumed care of pt. IV started, continued to prep pt for  section.    0813- Pt in OR # 2 for primary  section.    0839- delivery of a viable male by Dr. Hurtado and Dr. Oh. Apgars 8/9    0916- Pt in PACU    1032- Dr. Oh notified of boggy fundus and moserate lochia with clots. Orders for misoprostol received.    1110- Dr. Oh notified of lochia- continuous flow and clots noted upon fundal massage. Orders for CBC and TXA received. Pt updated on POC.

## 2024-03-18 NOTE — ANESTHESIA POSTPROCEDURE EVALUATION
Patient: Marilin Buck    Procedure Summary       Date: 24 Room / Location: LND OR 02 / SURGERY LABOR AND DELIVERY    Anesthesia Start: 813 Anesthesia Stop: 920    Procedure: PRIMARY  SECTION Diagnosis: (39 wk IUP, elective c/s)    Surgeons: Emil Hurtado M.D. Responsible Provider: Marcelo Noble M.D.    Anesthesia Type: spinal ASA Status: 2            Final Anesthesia Type: spinal  Last vitals  BP   Blood Pressure: 127/73    Temp   36.7 °C (98 °F)    Pulse   75   Resp   16    SpO2   98 %      Anesthesia Post Evaluation    Patient location during evaluation: PACU  Patient participation: complete - patient participated  Level of consciousness: awake and alert  Pain score: 0    Airway patency: patent  Anesthetic complications: no  Cardiovascular status: hemodynamically stable  Respiratory status: acceptable  Hydration status: euvolemic    PONV: none        No notable events documented.     Nurse Pain Score: 6 (NPRS)

## 2024-03-18 NOTE — ANESTHESIA TIME REPORT
Anesthesia Start and Stop Event Times       Date Time Event    3/18/2024 0723 Ready for Procedure     0813 Anesthesia Start     0920 Anesthesia Stop          Responsible Staff  03/18/24      Name Role Begin End    Marcelo Noble M.D. Anesth 0813 0920          Overtime Reason:  no overtime (within assigned shift)    Comments:

## 2024-03-18 NOTE — CARE PLAN
The patient is Stable - Low risk of patient condition declining or worsening    Shift Goals  Clinical Goals: maintain normal lochia  Patient Goals: pain controlled, rest  Family Goals: delivery    Progress made toward(s) clinical / shift goals:    Problem: Knowledge Deficit - Postpartum  Goal: Patient will verbalize and demonstrate understanding of self and infant care  Outcome: Progressing  Note: Providing frequent education during patients stay     Problem: Psychosocial - Postpartum  Goal: Patient will verbalize and demonstrate effective bonding and parenting behavior  Outcome: Progressing  Note: Patient will complete PPD screening; encouraged to voice feelings       Patient is not progressing towards the following goals:

## 2024-03-19 PROCEDURE — 700102 HCHG RX REV CODE 250 W/ 637 OVERRIDE(OP): Performed by: OBSTETRICS & GYNECOLOGY

## 2024-03-19 PROCEDURE — 770002 HCHG ROOM/CARE - OB PRIVATE (112)

## 2024-03-19 PROCEDURE — 700102 HCHG RX REV CODE 250 W/ 637 OVERRIDE(OP): Performed by: ANESTHESIOLOGY

## 2024-03-19 PROCEDURE — A9270 NON-COVERED ITEM OR SERVICE: HCPCS | Performed by: OBSTETRICS & GYNECOLOGY

## 2024-03-19 PROCEDURE — A9270 NON-COVERED ITEM OR SERVICE: HCPCS | Performed by: ANESTHESIOLOGY

## 2024-03-19 PROCEDURE — 700111 HCHG RX REV CODE 636 W/ 250 OVERRIDE (IP): Performed by: ANESTHESIOLOGY

## 2024-03-19 RX ADMIN — OXYCODONE HYDROCHLORIDE 10 MG: 10 TABLET ORAL at 21:20

## 2024-03-19 RX ADMIN — PRENATAL WITH FERROUS FUM AND FOLIC ACID 1 TABLET: 3080; 920; 120; 400; 22; 1.84; 3; 20; 10; 1; 12; 200; 27; 25; 2 TABLET ORAL at 08:12

## 2024-03-19 RX ADMIN — ACETAMINOPHEN 1000 MG: 500 TABLET, FILM COATED ORAL at 17:06

## 2024-03-19 RX ADMIN — DOCUSATE SODIUM 100 MG: 100 CAPSULE, LIQUID FILLED ORAL at 08:13

## 2024-03-19 RX ADMIN — OXYCODONE HYDROCHLORIDE 10 MG: 10 TABLET ORAL at 17:07

## 2024-03-19 RX ADMIN — OXYCODONE 5 MG: 5 TABLET ORAL at 08:12

## 2024-03-19 RX ADMIN — IBUPROFEN 800 MG: 800 TABLET, FILM COATED ORAL at 16:27

## 2024-03-19 RX ADMIN — KETOROLAC TROMETHAMINE 15 MG: 15 INJECTION, SOLUTION INTRAMUSCULAR; INTRAVENOUS at 03:20

## 2024-03-19 RX ADMIN — OXYCODONE 5 MG: 5 TABLET ORAL at 12:19

## 2024-03-19 RX ADMIN — ACETAMINOPHEN 1000 MG: 500 TABLET, FILM COATED ORAL at 06:01

## 2024-03-19 RX ADMIN — DOCUSATE SODIUM 100 MG: 100 CAPSULE, LIQUID FILLED ORAL at 17:07

## 2024-03-19 RX ADMIN — KETOROLAC TROMETHAMINE 15 MG: 15 INJECTION, SOLUTION INTRAMUSCULAR; INTRAVENOUS at 09:54

## 2024-03-19 RX ADMIN — ACETAMINOPHEN 1000 MG: 500 TABLET, FILM COATED ORAL at 00:06

## 2024-03-19 ASSESSMENT — EDINBURGH POSTNATAL DEPRESSION SCALE (EPDS)
I HAVE BEEN SO UNHAPPY THAT I HAVE BEEN CRYING: NO, NEVER
THINGS HAVE BEEN GETTING ON TOP OF ME: NO, MOST OF THE TIME I HAVE COPED QUITE WELL
I HAVE BLAMED MYSELF UNNECESSARILY WHEN THINGS WENT WRONG: NOT VERY OFTEN
I HAVE FELT SCARED OR PANICKY FOR NO GOOD REASON: NO, NOT MUCH
I HAVE FELT SAD OR MISERABLE: NO, NOT AT ALL
I HAVE BEEN ABLE TO LAUGH AND SEE THE FUNNY SIDE OF THINGS: AS MUCH AS I ALWAYS COULD
I HAVE LOOKED FORWARD WITH ENJOYMENT TO THINGS: AS MUCH AS I EVER DID
THE THOUGHT OF HARMING MYSELF HAS OCCURRED TO ME: NEVER
I HAVE BEEN SO UNHAPPY THAT I HAVE HAD DIFFICULTY SLEEPING: NOT AT ALL
I HAVE BEEN ANXIOUS OR WORRIED FOR NO GOOD REASON: YES, SOMETIMES

## 2024-03-19 ASSESSMENT — PAIN DESCRIPTION - PAIN TYPE
TYPE: ACUTE PAIN;SURGICAL PAIN
TYPE: ACUTE PAIN
TYPE: SURGICAL PAIN
TYPE: ACUTE PAIN;SURGICAL PAIN
TYPE: SURGICAL PAIN
TYPE: ACUTE PAIN;SURGICAL PAIN
TYPE: SURGICAL PAIN
TYPE: SURGICAL PAIN
TYPE: ACUTE PAIN;SURGICAL PAIN
TYPE: ACUTE PAIN;SURGICAL PAIN

## 2024-03-19 NOTE — PROGRESS NOTES
1853: Report received from LENO Mcconnell. Met patient at bedside.      2130: Assessment completed. Pain medication given. LR running at 125 mL/hr. Educated patient to void by 0045 in hat. Patient plans to visit [facility] for hemorrhoid follow-up if discharged by Wednesday; if not, patient desires consult. Education regarding emergent and non-emergent call light use provided. Call light in patient reach.    2315: Patient helped to bathroom by this RN. Patient voided 700mL in hat.

## 2024-03-19 NOTE — PROGRESS NOTES
0600: Per Nikki, pt plans to visit [facility] for hemorrhoid follow-up if discharged by Wednesday. If not, desires consult. Relayed this information to Dr. Oh. Per MD, pt will discharge on PP Day 2. If not, there might be a potential for placing a consult.

## 2024-03-19 NOTE — CARE PLAN
The patient is Stable - Low risk of patient condition declining or worsening    Shift Goals  Clinical Goals: void post garces removal; pain control  Patient Goals: pain controlled, rest  Family Goals: delivery    Progress made toward(s) clinical / shift goals:  Patient with + void post garces removal of 700mL. Patient pain managed with prn and scheduled medications.    Patient is not progressing towards the following goals:

## 2024-03-19 NOTE — CARE PLAN
The patient is Stable - Low risk of patient condition declining or worsening    Shift Goals  Clinical Goals: maintain normal lochia  Patient Goals: pain controlled, rest  Family Goals: delivery    Progress made toward(s) clinical / shift goals:    Problem: Psychosocial - Postpartum  Goal: Patient will verbalize and demonstrate effective bonding and parenting behavior  3/19/2024 1509 by Enedina Rodrigues, R.N.  Outcome: Progressing  Note: Encouraged to voice feelings  3/19/2024 1428 by Enedina Rodrigues, R.N.  Outcome: Progressing  Note: Encouraged to voice feelings; depression screening give.     Problem: Bowel Elimination - Post Surgical  Goal: Patient will resume regular bowel sounds and function with no discomfort or distention  3/19/2024 1509 by Enedina Rodrigues, R.N.  Outcome: Progressing  Note: Frequent ambulation and stool softeners  3/19/2024 1428 by Enedina Rodrigues, R.N.  Outcome: Progressing  Note: Stool softeners and frequent ambulation       Patient is not progressing towards the following goals:

## 2024-03-19 NOTE — CARE PLAN
The patient is Stable - Low risk of patient condition declining or worsening    Shift Goals  Clinical Goals: maintain normal lochia  Patient Goals: pain controlled, rest  Family Goals: delivery    Progress made toward(s) clinical / shift goals:    Problem: Psychosocial - Postpartum  Goal: Patient will verbalize and demonstrate effective bonding and parenting behavior  Outcome: Progressing  Note: Encouraged to voice feelings; depression screening give.     Problem: Bowel Elimination - Post Surgical  Goal: Patient will resume regular bowel sounds and function with no discomfort or distention  Outcome: Progressing  Note: Stool softeners and frequent ambulation       Patient is not progressing towards the following goals:

## 2024-03-19 NOTE — LACTATION NOTE
Initial Lactation Consultation:    Met with Marilin and her new baby boy to provide lactation support. Marilin reports that breastfeeding is going very well. Infant is waking to feed every 1-2 hours. She denies any breastfeeding-related concerns.    Infant is currently skin-to-skin, at the breast; visualized to have rhythmic suck pattern at breast. Mother of baby denies pain with latch.     feeding  patterns reviewed. Frequent skin-to-skin and cue-based feeding is encouraged; at least 8 feeds per 24 hours. Reviewed the milk making process, inclusive of supply and demand. Discussed signs of deep, asymmetric latch, and the importance of maintaining good latch to avoid pain/nipple damage and maximize milk transfer. Baby should be allowed to self-limit time at breast. Discouraged introduction of artificial nipples during first 2-4 weeks, unless medically indicated.    Feeding plan:     Continue with cue-based breastfeeding, at least once every three hours.    Marilin is provided with the opportunity to ask questions. These have been answered to her satisfaction. She is encouraged to call RN/lactation for additional breastfeeding assistance, as needed, throughout remainder of hospital stay.       Encouraged follow up with [facility] for outpatient lactation support (referral sent).     [facility] Breastfeeding Resource list provided.

## 2024-03-19 NOTE — LACTATION NOTE
I met with PHANI Sanchez as Marilin is in the shower. He reports that baby Partha has been latching and breast feeding well.     He had some concerns regarding an upcoming trip that the family has to make next month. I assured him that Marilin can be seen at the [facility] for trip planning. A referral was submitted yesterday.    I will attempt to visit again today or first thing tomorrow morning to see Marilin.

## 2024-03-19 NOTE — PROGRESS NOTES
Received report from NOC, RN at change of shift. Patient assessed and POC discussed. Patient is resting in bed. Fundus firm, lochia light.  Patient denies any needs at this time. Call light within reach, bed in lowest position. Patient is encouraged to call for pain/med interventions and any other needs.

## 2024-03-20 PROCEDURE — A9270 NON-COVERED ITEM OR SERVICE: HCPCS | Performed by: OBSTETRICS & GYNECOLOGY

## 2024-03-20 PROCEDURE — 770002 HCHG ROOM/CARE - OB PRIVATE (112)

## 2024-03-20 PROCEDURE — 700102 HCHG RX REV CODE 250 W/ 637 OVERRIDE(OP): Performed by: OBSTETRICS & GYNECOLOGY

## 2024-03-20 RX ORDER — POLYETHYLENE GLYCOL 3350 17 G/17G
1 POWDER, FOR SOLUTION ORAL DAILY
Status: DISCONTINUED | OUTPATIENT
Start: 2024-03-20 | End: 2024-03-22 | Stop reason: HOSPADM

## 2024-03-20 RX ADMIN — Medication 10 MG: at 12:28

## 2024-03-20 RX ADMIN — IBUPROFEN 800 MG: 800 TABLET, FILM COATED ORAL at 08:35

## 2024-03-20 RX ADMIN — OXYCODONE 5 MG: 5 TABLET ORAL at 20:21

## 2024-03-20 RX ADMIN — ACETAMINOPHEN 1000 MG: 500 TABLET, FILM COATED ORAL at 18:04

## 2024-03-20 RX ADMIN — DOCUSATE SODIUM 100 MG: 100 CAPSULE, LIQUID FILLED ORAL at 16:11

## 2024-03-20 RX ADMIN — OXYCODONE 5 MG: 5 TABLET ORAL at 16:11

## 2024-03-20 RX ADMIN — ACETAMINOPHEN 1000 MG: 500 TABLET, FILM COATED ORAL at 12:27

## 2024-03-20 RX ADMIN — POLYETHYLENE GLYCOL 3350 1 PACKET: 17 POWDER, FOR SOLUTION ORAL at 18:04

## 2024-03-20 RX ADMIN — PRENATAL WITH FERROUS FUM AND FOLIC ACID 1 TABLET: 3080; 920; 120; 400; 22; 1.84; 3; 20; 10; 1; 12; 200; 27; 25; 2 TABLET ORAL at 08:34

## 2024-03-20 RX ADMIN — OXYCODONE 5 MG: 5 TABLET ORAL at 08:34

## 2024-03-20 RX ADMIN — ACETAMINOPHEN 1000 MG: 500 TABLET, FILM COATED ORAL at 06:12

## 2024-03-20 RX ADMIN — DOCUSATE SODIUM 100 MG: 100 CAPSULE, LIQUID FILLED ORAL at 08:34

## 2024-03-20 RX ADMIN — IBUPROFEN 800 MG: 800 TABLET, FILM COATED ORAL at 00:29

## 2024-03-20 RX ADMIN — OXYCODONE 5 MG: 5 TABLET ORAL at 12:27

## 2024-03-20 RX ADMIN — IBUPROFEN 800 MG: 800 TABLET, FILM COATED ORAL at 16:11

## 2024-03-20 RX ADMIN — SIMETHICONE 125 MG: 125 TABLET, CHEWABLE ORAL at 12:27

## 2024-03-20 RX ADMIN — OXYCODONE HYDROCHLORIDE 10 MG: 10 TABLET ORAL at 04:23

## 2024-03-20 RX ADMIN — OXYCODONE HYDROCHLORIDE 10 MG: 10 TABLET ORAL at 01:08

## 2024-03-20 RX ADMIN — ACETAMINOPHEN 1000 MG: 500 TABLET, FILM COATED ORAL at 00:30

## 2024-03-20 ASSESSMENT — PAIN DESCRIPTION - PAIN TYPE
TYPE: ACUTE PAIN;SURGICAL PAIN
TYPE: ACUTE PAIN;SURGICAL PAIN
TYPE: ACUTE PAIN
TYPE: SURGICAL PAIN
TYPE: ACUTE PAIN
TYPE: SURGICAL PAIN
TYPE: ACUTE PAIN;SURGICAL PAIN

## 2024-03-20 NOTE — LACTATION NOTE
This note was copied from a baby's chart.  SARAH Gaston reports that she is breastfeeding her  Luke without difficulty or discomfort. I encouraged ad jeremy feeds and unrestricted feeding times which Marilin is trying to do. She had a few appropriate questions which we explored, regarding pumping and supplementing.

## 2024-03-20 NOTE — PROGRESS NOTES
POD#2  S) pt c/o pain worse this am - desires to stay another day  O) vss  Inc: c/d/i, mepilex in place  Ext: no  edema  Hgb 10.7  A/P POD#2, s/p primary elective c/s   - stable, continue orders

## 2024-03-20 NOTE — CARE PLAN
The patient is Stable - Low risk of patient condition declining or worsening    Shift Goals  Clinical Goals: pain control; rest  Patient Goals: pain controlled, rest  Family Goals: delivery    Progress made toward(s) clinical / shift goals:  Pain controlled through PRN and scheduled pain medication administrations. Pt slept with minimal interruptions for care.    Patient is not progressing towards the following goals:

## 2024-03-20 NOTE — CARE PLAN
The patient is Stable - Low risk of patient condition declining or worsening    Shift Goals  Clinical Goals: maintain normal lochia  Patient Goals: pain controlled, rest  Family Goals: support of mom and baby    Progress made toward(s) clinical / shift goals:    Problem: Psychosocial - Postpartum  Goal: Patient will verbalize and demonstrate effective bonding and parenting behavior  Outcome: Progressing  Note: Encouraged to voice feeling; education on PPD before discharge     Problem: Infection - Postpartum  Goal: Postpartum patient will be free of signs and symptoms of infection  Outcome: Progressing  Note: VSS       Patient is not progressing towards the following goals:

## 2024-03-20 NOTE — PROGRESS NOTES
1905: Received report from day shift RNEnedina. Greeted pt and SO at the bedside. Whiteboard updated.     Per report, pt plans to reschedule her appointment with [facility] for her hemorrhoid follow-up originally scheduled for Wednesday.     2030: Completed assessment. Pt complains of pain at this time. Will notify this RN when needing pain interventions. No IV in place. POC discussed: pain control, lochia, hydration, and ambulation. Reinforced education on emergency and non-emergency call light system, and bed safety. Pt verbalized understanding. Call light placed within reach.

## 2024-03-20 NOTE — PROGRESS NOTES
Received report from LENO Lala at change of shift. Patient assessed and POC discussed. Patient is resting in bed. Reports lochia light.  Patient denies any needs at this time. Call light within reach, bed in lowest position. Patient is encouraged to call for pain/med interventions and any other needs.

## 2024-03-21 PROCEDURE — 770002 HCHG ROOM/CARE - OB PRIVATE (112)

## 2024-03-21 PROCEDURE — 700102 HCHG RX REV CODE 250 W/ 637 OVERRIDE(OP): Performed by: OBSTETRICS & GYNECOLOGY

## 2024-03-21 PROCEDURE — A9270 NON-COVERED ITEM OR SERVICE: HCPCS | Performed by: OBSTETRICS & GYNECOLOGY

## 2024-03-21 RX ADMIN — SIMETHICONE 125 MG: 125 TABLET, CHEWABLE ORAL at 20:18

## 2024-03-21 RX ADMIN — POLYETHYLENE GLYCOL 3350 1 PACKET: 17 POWDER, FOR SOLUTION ORAL at 20:18

## 2024-03-21 RX ADMIN — IBUPROFEN 800 MG: 800 TABLET, FILM COATED ORAL at 16:26

## 2024-03-21 RX ADMIN — OXYCODONE 5 MG: 5 TABLET ORAL at 00:41

## 2024-03-21 RX ADMIN — OXYCODONE 5 MG: 5 TABLET ORAL at 08:49

## 2024-03-21 RX ADMIN — OXYCODONE 5 MG: 5 TABLET ORAL at 12:32

## 2024-03-21 RX ADMIN — IBUPROFEN 800 MG: 800 TABLET, FILM COATED ORAL at 08:26

## 2024-03-21 RX ADMIN — ACETAMINOPHEN 1000 MG: 500 TABLET, FILM COATED ORAL at 06:13

## 2024-03-21 RX ADMIN — IBUPROFEN 800 MG: 800 TABLET, FILM COATED ORAL at 00:13

## 2024-03-21 RX ADMIN — OXYCODONE 5 MG: 5 TABLET ORAL at 18:44

## 2024-03-21 RX ADMIN — ACETAMINOPHEN 1000 MG: 500 TABLET, FILM COATED ORAL at 00:12

## 2024-03-21 RX ADMIN — PRENATAL WITH FERROUS FUM AND FOLIC ACID 1 TABLET: 3080; 920; 120; 400; 22; 1.84; 3; 20; 10; 1; 12; 200; 27; 25; 2 TABLET ORAL at 08:26

## 2024-03-21 RX ADMIN — ACETAMINOPHEN 1000 MG: 500 TABLET, FILM COATED ORAL at 18:44

## 2024-03-21 RX ADMIN — DOCUSATE SODIUM 100 MG: 100 CAPSULE, LIQUID FILLED ORAL at 16:35

## 2024-03-21 RX ADMIN — POLYETHYLENE GLYCOL 3350 1 PACKET: 17 POWDER, FOR SOLUTION ORAL at 06:13

## 2024-03-21 RX ADMIN — DOCUSATE SODIUM 100 MG: 100 CAPSULE, LIQUID FILLED ORAL at 04:46

## 2024-03-21 RX ADMIN — ACETAMINOPHEN 1000 MG: 500 TABLET, FILM COATED ORAL at 12:32

## 2024-03-21 RX ADMIN — OXYCODONE 5 MG: 5 TABLET ORAL at 04:46

## 2024-03-21 ASSESSMENT — PAIN DESCRIPTION - PAIN TYPE
TYPE: SURGICAL PAIN
TYPE: SURGICAL PAIN
TYPE: ACUTE PAIN
TYPE: ACUTE PAIN
TYPE: ACUTE PAIN;SURGICAL PAIN
TYPE: ACUTE PAIN
TYPE: ACUTE PAIN;SURGICAL PAIN
TYPE: ACUTE PAIN
TYPE: ACUTE PAIN
TYPE: ACUTE PAIN;SURGICAL PAIN
TYPE: ACUTE PAIN;SURGICAL PAIN
TYPE: ACUTE PAIN

## 2024-03-21 NOTE — LACTATION NOTE
This note was copied from a baby's chart.  Marilin reports that she is breastfeeding her  without difficulty or discomfort. She feels her milk has come in and is making an effort to have Luke breast feed from both sides to help alleviate fullness. Her breasts are full but soft. She has mild axillary swelling that is soft and tender.    She knows to call if any concerns.

## 2024-03-21 NOTE — CARE PLAN
2The patient is Stable - Low risk of patient condition declining or worsening    Shift Goals  Clinical Goals: fundus frim, lochia WDL  Patient Goals: pain control, rest  Family Goals: support of mom and baby    Progress made toward(s) clinical / shift goals:    Problem: Knowledge Deficit - Postpartum  Goal: Patient will verbalize and demonstrate understanding of self and infant care  Outcome: Progressing  Note: Patient is engaged in self care and infant care education. Patient actively utilizes knowledge to care for self and infant.     Problem: Altered Physiologic Condition  Goal: Patient physiologically stable as evidenced by normal lochia, palpable uterine involution and vitals within normal limits  Outcome: Progressing  Note: Fundus firm and midline. Lochia light, rubra. Vitals within defined limits       Patient is not progressing towards the following goals:

## 2024-03-21 NOTE — PROGRESS NOTES
"OB Post Operative Note    Doing well this morning.  She is a little sore but denies significant pain.  Normal lochia.  Tolerating p.o.  Passing flatus and has taken MiraLAX to help with stooling with her severe hemorrhoids.  Working on breast-feeding.  She would like to remain in-house 1 more night if possible as her house has significant stairs that she is concerned about getting home to.    Vitals  /68   Pulse 67   Temp 36.4 °C (97.6 °F) (Temporal)   Resp 16   Ht 1.549 m (5' 1\")   Wt 72.6 kg (160 lb)   SpO2 97%   Breastfeeding Yes   BMI 30.23 kg/m²       Gen: NAD, resting comfortably  GI: soft, non tender to palpation, incision c/d/i with Mepilex dressing in place  :fundus firm and below umbilicus  Ext: no edema      Recent Labs     03/18/24  1150 03/18/24  1737   WBC 16.1* 14.6*   RBC 3.55* 3.34*   HEMOGLOBIN 11.3* 10.7*   HEMATOCRIT 33.5* 31.4*   MCV 94.4 94.0   MCH 31.8 32.0   RDW 45.1 45.1   PLATELETCT 146* 148*   MPV 11.0 10.9   NEUTSPOLYS 81.30*  --    LYMPHOCYTES 12.30*  --    MONOCYTES 4.00  --    EOSINOPHILS 1.70  --    BASOPHILS 0.20  --        Assessment:  POD day # 3 s/p  on demand for severe hemorrhoids    Plan:  Routine post operative care  Discharge home on POD# 4    Pasha Carbajal M.D.     "

## 2024-03-21 NOTE — PROGRESS NOTES
0830  Received report from LENO Rutledge at change of shift. Patient assessed and POC discussed. Patient is resting in bed. Fundus firm, lochia light.  Patient denies any needs at this time. Call light within reach, bed in lowest position. Patient is encouraged to call for pain/med interventions and any other needs.

## 2024-03-21 NOTE — CARE PLAN
The patient is Stable - Low risk of patient condition declining or worsening    Shift Goals  Clinical Goals: VSS, normal lochia, pain control  Patient Goals: pain control, rest  Family Goals: support of mom and baby    Progress made toward(s) clinical / shift goals:      Problem: Knowledge Deficit - Postpartum  Goal: Patient will verbalize and demonstrate understanding of self and infant care  Outcome: Progressing  Note: Discussed care plan with patient and answered all of her questions. She demonstrated understanding     Problem: Bowel Elimination - Post Surgical  Goal: Patient will resume regular bowel sounds and function with no discomfort or distention  Outcome: Progressing  Note: Patient was given medications to help with bowel elimination and was able to have a bowel movement. She was educated on the benefits of ambulation and was instructed to let me know if she has further issues with bowel movements     Problem: Early Mobilization - Post Surgery  Goal: Early mobilization post surgery  Outcome: Progressing  Note: Patient is self ambulating and is having prn pain administered at her request. She is able to mobilize with minimal discomfort

## 2024-03-21 NOTE — LACTATION NOTE
Marilin and I had a discussion this afternoon on removing milk without stimulating too much of her supply. Several options explored and she is thing she may use a combination of massage and her Haaka until her breasts aren't as full. I reassured her that Partha will likely begin to wake up more and eat better as the next few weeks elapse.

## 2024-03-21 NOTE — PROGRESS NOTES
1900- Received report from [facility]. Assumed care. 12 hour chart check, MAR and orders reviewed.      2030- Assessment complete. Fundus firm two fingers below umbilicusand palpable, lochia scant rubra. Pain management and interventions discussed with pt. SO at bedside. POC discussed. All questions and concerns discussed. No further concerns.

## 2024-03-22 ENCOUNTER — PHARMACY VISIT (OUTPATIENT)
Dept: PHARMACY | Facility: MEDICAL CENTER | Age: 29
End: 2024-03-22
Payer: COMMERCIAL

## 2024-03-22 VITALS
HEIGHT: 61 IN | DIASTOLIC BLOOD PRESSURE: 72 MMHG | TEMPERATURE: 97.3 F | WEIGHT: 160 LBS | RESPIRATION RATE: 17 BRPM | HEART RATE: 70 BPM | BODY MASS INDEX: 30.21 KG/M2 | OXYGEN SATURATION: 98 % | SYSTOLIC BLOOD PRESSURE: 134 MMHG

## 2024-03-22 PROCEDURE — 700102 HCHG RX REV CODE 250 W/ 637 OVERRIDE(OP): Performed by: OBSTETRICS & GYNECOLOGY

## 2024-03-22 PROCEDURE — A9270 NON-COVERED ITEM OR SERVICE: HCPCS | Performed by: OBSTETRICS & GYNECOLOGY

## 2024-03-22 PROCEDURE — RXMED WILLOW AMBULATORY MEDICATION CHARGE: Performed by: OBSTETRICS & GYNECOLOGY

## 2024-03-22 RX ORDER — ACETAMINOPHEN 500 MG
500 TABLET ORAL ONCE
Status: COMPLETED | OUTPATIENT
Start: 2024-03-22 | End: 2024-03-22

## 2024-03-22 RX ORDER — OXYCODONE HYDROCHLORIDE 5 MG/1
5 TABLET ORAL EVERY 4 HOURS PRN
Qty: 8 TABLET | Refills: 0 | Status: SHIPPED | OUTPATIENT
Start: 2024-03-22 | End: 2024-03-27

## 2024-03-22 RX ORDER — IBUPROFEN 800 MG/1
800 TABLET ORAL EVERY 8 HOURS PRN
Qty: 30 TABLET | Refills: 0 | Status: SHIPPED | OUTPATIENT
Start: 2024-03-22

## 2024-03-22 RX ORDER — PSEUDOEPHEDRINE HCL 30 MG
100 TABLET ORAL 2 TIMES DAILY PRN
Qty: 60 CAPSULE | Refills: 0 | Status: SHIPPED | OUTPATIENT
Start: 2024-03-22

## 2024-03-22 RX ADMIN — IBUPROFEN 800 MG: 800 TABLET, FILM COATED ORAL at 00:36

## 2024-03-22 RX ADMIN — DOCUSATE SODIUM 100 MG: 100 CAPSULE, LIQUID FILLED ORAL at 08:36

## 2024-03-22 RX ADMIN — OXYCODONE 5 MG: 5 TABLET ORAL at 12:00

## 2024-03-22 RX ADMIN — ACETAMINOPHEN 1000 MG: 500 TABLET, FILM COATED ORAL at 06:23

## 2024-03-22 RX ADMIN — ACETAMINOPHEN 500 MG: 500 TABLET, FILM COATED ORAL at 12:52

## 2024-03-22 RX ADMIN — ACETAMINOPHEN 1000 MG: 500 TABLET, FILM COATED ORAL at 00:36

## 2024-03-22 RX ADMIN — PRENATAL WITH FERROUS FUM AND FOLIC ACID 1 TABLET: 3080; 920; 120; 400; 22; 1.84; 3; 20; 10; 1; 12; 200; 27; 25; 2 TABLET ORAL at 08:36

## 2024-03-22 RX ADMIN — ACETAMINOPHEN 500 MG: 500 TABLET, FILM COATED ORAL at 12:42

## 2024-03-22 RX ADMIN — IBUPROFEN 800 MG: 800 TABLET, FILM COATED ORAL at 08:38

## 2024-03-22 RX ADMIN — SIMETHICONE 125 MG: 125 TABLET, CHEWABLE ORAL at 08:36

## 2024-03-22 ASSESSMENT — PAIN DESCRIPTION - PAIN TYPE
TYPE: ACUTE PAIN;SURGICAL PAIN
TYPE: SURGICAL PAIN

## 2024-03-22 NOTE — DISCHARGE INSTRUCTIONS
PATIENT DISCHARGE EDUCATION INSTRUCTION SHEET    REASONS TO CALL YOUR OBSTETRICIAN  Persistent fever, shaking, chills (Temperature higher than 100.4) may indicate you have an infection  Heavy bleeding: soaking more than 1 pad per hour; Passing clots an egg-sized clot or bigger may mean you have an postpartum hemorrhage  Foul odor from vagina or bad smelling or discolored discharge or blood  Breast infection (Mastitis symptoms); breast pain, chills, fever, redness or red streaks, may feel flu like symptoms  Urinary pain, burning or frequency  Incision that is not healing, increased redness, swelling, tenderness or pain, or any pus from episiotomy or  site may mean you have an infection  Redness, swelling, warmth, or painful to touch in the calf area of your leg may mean you have a blood clot  Severe or intensified depression, thoughts or feelings of wanting to hurt yourself or someone else   Pain in chest, obstructed breathing or shortness of breath (trouble catching your breath) may mean you are having a postpartum complication. Call your provider immediately   Headache that does not get better, even after taking medicine, a bad headache with vision changes or pain in the upper right area of your belly may mean you have high blood pressure or post birth preeclampsia. Call your provider immediately    HAND WASHING  All family and friends should wash their hands:  Before and after holding the baby  Before feeding the baby  After using the restroom or changing the baby's diaper    WOUND CARE  Ask your physician for additional care instructions. In general:   Incision:  May shower and pat incision dry   Keep the incision clean and dry  There should not be any opening or pus from the incision  Continue to walk at home 3 times a day   Do NOT lift anything heavier than your baby (over 10 pounds)  Encourage family to help participate in care of the  to allow rest and mom time to heal    VAGINAL CARE  "AND BLEEDING  Nothing inside vagina for 6 weeks:   No sexual intercourse, tampons or douching  Bleeding may continue for 2-4 weeks. Amount and color may vary  Soaking 1 pad or more in an hour for several hours is considered heavy bleeding  Passing large egg sized blood clots can be concerning  If you feel like you have heavy bleeding or are having increasing amount of blood clots call your Obstetrician immediately  If you begin feeling faint upon standing, feeling sick to your stomach, have clammy skin, a really fast heartbeat, have chills, start feeling confused, dizzy, sleepy or weak, or feeling like you're going to faint call your Obstetrician immediately    HYPERTENSION   Preeclampsia or gestational hypertension are types of high blood pressure that only pregnant women can get. It is important for you to be aware of symptoms to seek early intervention and treatment. If you have any of these symptoms immediately call your Obstetrician    Vision changes or blurred vision   Severe headache or pain that is unrelieved with medication and will not go away  Persistent pain in upper abdomen or shoulder   Increased swelling of face, feet, or hands  Difficulty breathing or shortness of breath at rest  Urinating less than usual    URINATION AND BOWEL MOVEMENTS  Eating more fiber (bran cereal, fruits, and vegetables) and drinking plenty of fluids will help to avoid constipation  Urinary frequency and urgency after childbirth is normal  If you experience any urinary pain, burning or frequency call your provider    BIRTH CONTROL  It is possible to become pregnant at any time after delivery and while breastfeeding  Plan to discuss a method of birth control with your physician at your post delivery follow up visit    POSTPARTUM BLUES  During the first few days after birth, you may experience a sense of the \"blues\" which may include impatience, irritability or even crying. These feelings come and go quickly. However, as many as " "1 in 10 women experience emotional symptoms known as postpartum depression.     POSTPARTUM DEPRESSION  May start as early as the second or third day after delivery or take several weeks or months to develop. Symptoms of \"blues\" are present, but are more intense: Crying spells; loss of appetite; feelings of hopelessness or loss of control; fear of touching the baby; over concern or no concern at all about the baby; little or no concern about your own appearance/caring for yourself; and/or inability to sleep or excessive sleeping. Contact your Obstetrician if you are experiencing any of these symptoms     PREVENTING SHAKEN BABY  If you are angry or stressed, PUT THE BABY IN THE CRIB, step away, take some deep breaths, and wait until you are calm to care for the baby. DO NOT SHAKE THE BABY. You are not alone, call a supporter for help.  [facility] 24/7 crisis call line (849-394-1560) or (1-141.864.1383)  You can also text them, text \"ANSWER\" (759671)  "

## 2024-03-22 NOTE — DISCHARGE SUMMARY
Discharge Summary:     Date of Admission: 3/18/2024  Date of Discharge: 24      Admitting diagnosis:    1. Pregnancy @ 39w1d      Discharge Diagnosis:   1. Status post primary elective  delivery.    Past Medical History:   Diagnosis Date    Acne      OB History    Para Term  AB Living   2 2 2     2   SAB IAB Ectopic Molar Multiple Live Births           0 2      # Outcome Date GA Lbr Gurdeep/2nd Weight Sex Delivery Anes PTL Lv   2 Term 24 39w1d  2.92 kg (6 lb 7 oz) M CS-LTranv Spinal N MAX   1 Term 21 38w6d 07:32 / 00:28 2.56 kg (5 lb 10.3 oz) F Vag-Spont Local  MAX     Past Surgical History:   Procedure Laterality Date    PRIMARY C SECTION N/A 3/18/2024    Procedure: PRIMARY  SECTION;  Surgeon: Emil Hurtado M.D.;  Location: SURGERY LABOR AND DELIVERY;  Service: Labor and Delivery    SEPTOPLASTY N/A 2018    Procedure: SEPTOPLASTY;  Surgeon: Portillo Avila M.D.;  Location: SURGERY SAME DAY [facility];  Service: Ent    TURBINOPLASTY Bilateral 2018    Procedure: TURBINOPLASTY;  Surgeon: Portillo Avila M.D.;  Location: SURGERY SAME DAY [facility];  Service: Ent    ETHMOIDECTOMY N/A 2018    Procedure: ETHMOIDECTOMY- ENDOSCOPIC TOTAL;  Surgeon: Portillo Avila M.D.;  Location: SURGERY SAME DAY [facility];  Service: Ent    ANTROSTOMY N/A 2018    Procedure: ANTROSTOMY- ENDOSCOPIC MAXILLARY;  Surgeon: Portillo Avila M.D.;  Location: SURGERY SAME DAY [facility];  Service: Ent    SEPTOPLASTY Bilateral 8/3/2015    Procedure: SEPTOPLASTY W/BILAT ENDOSCOPIC CARTER BULLOSA RESECTION;  Surgeon: ALEYDA Waldron M.D.;  Location: SURGERY SAME DAY [facility];  Service:     TURBINOPLASTY Bilateral 8/3/2015    Procedure: TURBINOPLASTY;  Surgeon: ALEYDA Waldron M.D.;  Location: SURGERY SAME DAY [facility];  Service:     ETHMOIDECTOMY Bilateral 8/3/2015    Procedure: ENDOSCOPIC TOTAL ETHMOIDECTOMY;  Surgeon: ALEYDA Waldron M.D.;  Location: SURGERY SAME DAY  [facility];  Service:     ANTROSTOMY Bilateral 8/3/2015    Procedure: ENDOSCOPIC MAXILLARY ANTROSTOMY WITH TISSUE REMOVAL;  Surgeon: ALEYDA Waldron M.D.;  Location: SURGERY SAME DAY [facility];  Service:     OTHER  2012    removal of wisdom teeth     Allergies: Kenalog [triamcinolone] and Tetracyclines    Hospital Course:   Pt is a 28 y.o. now  who presented for scheduled desired primary elective  delivery secondary to significant hemorrhoids.  Her surgery was uncomplicated with an EBL of 600cc.  She gave birth to a viable male infant with APGARs of 8 and 9, weight 2920g.  Her postoperative course was unremarkable.  She remained afebrile with normal vital signs.  On POD 4 the pt felt ready for discharge. On day of discharge pt was ambulating, voiding spontaneously, tolerating PO, with adequate pain control, and desiring to go home.    Physical Exam:  Temp:  [36.3 °C (97.3 °F)-36.3 °C (97.4 °F)] 36.3 °C (97.3 °F)  Pulse:  [61-70] 70  Resp:  [15-17] 17  BP: (100-134)/(68-72) 134/72  SpO2:  [97 %-98 %] 98 %  Gen: AAO, NAD  Resp: ctab  CV: RRR  Abd: soft, NT, ND, fundus firm below umbilicus   Incision: mepilex in place, C/D/I  Ext: NT, trace edema bilaterally    Current Facility-Administered Medications   Medication Dose    polyethylene glycol/lytes (Miralax) Packet 1 Packet  1 Packet    oxytocin (Pitocin) infusion (for post delivery)  125 mL/hr    oxytocin (Pitocin) injection 10 Units  10 Units    miSOPROStol (Cytotec) tablet 800 mcg  800 mcg    methylergonovine (Methergine) injection 0.2 mg  0.2 mg    carboPROST (Hemabate) injection 250 mcg  250 mcg    lactated ringers infusion      lactated ringers infusion      ibuprofen (Motrin) tablet 800 mg  800 mg    Followed by    ibuprofen (Motrin) tablet 800 mg  800 mg    acetaminophen (Tylenol) tablet 1,000 mg  1,000 mg    Followed by    acetaminophen (Tylenol) tablet 1,000 mg  1,000 mg    oxyCODONE immediate-release (Roxicodone) tablet 5 mg  5  mg    oxyCODONE immediate release (Roxicodone) tablet 10 mg  10 mg    ondansetron (Zofran) syringe/vial injection 4 mg  4 mg    Or    ondansetron (Zofran ODT) dispertab 4 mg  4 mg    diphenhydrAMINE (Benadryl) tablet/capsule 25 mg  25 mg    Or    diphenhydrAMINE (Benadryl) injection 25 mg  25 mg    docusate sodium (Colace) capsule 100 mg  100 mg    prenatal plus vitamin (Stuartnatal 1+1) 27-1 MG tablet 1 Tablet  1 Tablet    simethicone (Mylicon) chewable tablet 125 mg  125 mg    calcium carbonate (Tums) chewable tab 1,000 mg  1,000 mg               Activity/ Discharge Instructions::   Discharge to home  Pelvic Rest x 6 weeks  No heavy lifting x4 weeks  Call or come to ED for: heavy vaginal bleeding, fever >100.4, severe abdominal pain, severe headache, chest pain, shortness of breath,  N/V, abnormal vaginal discharge, incisional drainage, or other concerns.       Follow up:  1-2wks for incision check with primary OB  Discharge Meds:      Medication List        START taking these medications        Instructions   docusate sodium 100 MG Caps   Take 100 mg by mouth 2 times a day as needed for Constipation.  Dose: 100 mg     ibuprofen 800 MG Tabs  Commonly known as: Motrin   Take 1 Tablet by mouth every 8 hours as needed for Moderate Pain.  Dose: 800 mg     oxyCODONE immediate-release 5 MG Tabs  Commonly known as: Roxicodone   Take 1 Tablet by mouth every four hours as needed for Severe Pain for up to 5 days.  Dose: 5 mg            CONTINUE taking these medications        Instructions   hydrocortisone 25 MG Supp  Commonly known as: Anusol-HC   1 SUPPOSITORY RECTAL ONCE A DAY 30 DAYS     PRENATAL 1 PO   Take  by mouth.                 Chery Arnold MD  [facility]

## 2024-03-22 NOTE — CARE PLAN
The patient is Stable - Low risk of patient condition declining or worsening    Shift Goals  Clinical Goals: VS WDL  Patient Goals: Breast feeding  Family Goals: support of mom and baby    Progress made toward(s) clinical / shift goals:    Problem: Knowledge Deficit - Postpartum  Goal: Patient will verbalize and demonstrate understanding of self and infant care  Outcome: Progressing     Problem: Psychosocial - Postpartum  Goal: Patient will verbalize and demonstrate effective bonding and parenting behavior  Outcome: Progressing     Problem: Altered Physiologic Condition  Goal: Patient physiologically stable as evidenced by normal lochia, palpable uterine involution and vitals within normal limits  Outcome: Progressing     Problem: Infection - Postpartum  Goal: Postpartum patient will be free of signs and symptoms of infection  Outcome: Progressing     Problem: Respiratory/Oxygenation Function Post-Surgical  Goal: Patient will achieve/maintain normal respiratory rate/effort  Outcome: Progressing     Problem: Bowel Elimination - Post Surgical  Goal: Patient will resume regular bowel sounds and function with no discomfort or distention  Outcome: Progressing     Problem: Early Mobilization - Post Surgery  Goal: Early mobilization post surgery  Outcome: Progressing       Patient is not progressing towards the following goals:

## 2024-03-22 NOTE — PROGRESS NOTES
1700- Report received from Bethany BURR. Care assumed    1900- Assessment done. Vital signs stable. Patient voiding without difficulty, claims to be passing flatus. Fundus firm at umbilicus with light lochia. [place] silver over low abdominal incision old drainage present, dry, and intact. No redness, swelling, or drainage noted. Patient ambulating with steady gait. Patient claims to have good pain relief with p.o meds. Breastfeeding infant on demand. Family at bedside. Patient encouraged to call for any needs. Will continue to monitor.

## 2024-03-22 NOTE — PROGRESS NOTES
0704- Bedside report received from LENO Larose.  Patient denied needs.  0838- Patient stated desire for discharge home today and was encouraged to read the written patient education/instruction sheet.  1050- Patient assessment done.  Patient reported she is voiding without difficulty and passing flatus.  Patient denied dizziness and reported that she is walking without difficulty.  Reviewed plan of care.  Patient verbalized understanding.  1230- Patient stated she read the written patient education/instruction sheet and has no questions.  Discharge instructions reviewed with patient who verbalized understanding and stated she has no questions.  Discharge paperwork signed by patient.  1242- Patient requested tylenol prior to discharge.  Patient took one tablet of tylenol 500 mg and dropped one tablet of 500 mg onto the floor.  Tablet wasted.  Call placed to the pharmacy to replace one tablet of tylenol 500 mg.  Patient will call when ready for escort out to the vehicle.  1309- Patient stated that she is ready for discharge.  Patient discharged to home, no change noted in condition, via wheelchair with infant and FOB.

## 2024-03-22 NOTE — CARE PLAN
The patient is Stable - Low risk of patient condition declining or worsening    Shift Goals  Clinical Goals: Maintain fundus firm, lochia light; pain management; discharge home  Patient Goals: discharge  Family Goals:     Progress made toward(s) clinical / shift goals:  MET

## 2024-03-22 NOTE — LACTATION NOTE
This note was copied from a baby's chart.  Lactation follow-up visit    Baby's Wt loss 4.11% (gained), couplet to be discharged today. MOB reports milk is in & breastfeeding is going well, denies pain or nipple damage with latches. MOB breast fed 1st baby x 5 months, milk dried up when she returned back to work. MOB plans to follow-up with the [facility] for breastfeeding support- order has been placed in Epic & [facility]NB Resource sheet given. Encouraged mother to call for any lactation needs.

## 2024-06-02 ENCOUNTER — PATIENT MESSAGE (OUTPATIENT)
Dept: MEDICAL GROUP | Facility: LAB | Age: 29
End: 2024-06-02
Payer: COMMERCIAL

## 2024-06-02 DIAGNOSIS — Z01.84 IMMUNITY STATUS TESTING: ICD-10-CM

## 2024-06-17 ENCOUNTER — HOSPITAL ENCOUNTER (OUTPATIENT)
Dept: LAB | Facility: MEDICAL CENTER | Age: 29
End: 2024-06-17
Attending: PHYSICIAN ASSISTANT
Payer: COMMERCIAL

## 2024-06-17 DIAGNOSIS — Z01.84 IMMUNITY STATUS TESTING: ICD-10-CM

## 2024-06-17 PROCEDURE — 36415 COLL VENOUS BLD VENIPUNCTURE: CPT

## 2024-06-17 PROCEDURE — 86735 MUMPS ANTIBODY: CPT

## 2024-06-17 PROCEDURE — 86762 RUBELLA ANTIBODY: CPT

## 2024-06-17 PROCEDURE — 86765 RUBEOLA ANTIBODY: CPT

## 2024-06-19 LAB
MEV IGG SER-ACNC: 242 AU/ML
MUV IGG SER IA-ACNC: 128 AU/ML
RUBV AB SER QL: 441 IU/ML

## 2025-01-27 ENCOUNTER — APPOINTMENT (OUTPATIENT)
Dept: MEDICAL GROUP | Facility: LAB | Age: 30
End: 2025-01-27
Payer: COMMERCIAL

## (undated) DEVICE — SENSOR SPO2 NEO LNCS ADHESIVE (20/BX) SEE USER NOTES

## (undated) DEVICE — GOWN WARMING STANDARD FLEX - (30/CA)

## (undated) DEVICE — PAD LAP STERILE 18 X 18 - (5/PK 40PK/CA)

## (undated) DEVICE — TUBE CONNECT SUCTION CLEAR 120 X 1/4" (50EA/CA)"

## (undated) DEVICE — Device

## (undated) DEVICE — SYRINGE SAFETY 10 ML 18 GA X 1 1/2 BLUNT LL (100/BX 4BX/CA)

## (undated) DEVICE — TRAY SPINAL ANESTHESIA NON-SAFETY (10/CA)

## (undated) DEVICE — DRAPE LG. APERTURE 33 X 51" - (10EA/BX)"

## (undated) DEVICE — SODIUM CHL IRRIGATION 0.9% 1000ML (12EA/CA)

## (undated) DEVICE — KIT ANESTHESIA W/CIRCUIT & 3/LT BAG W/FILTER (20EA/CA)

## (undated) DEVICE — CATHETER IV NON-SAFETY 18 GA X 1 1/4 (50/BX 4BX/CA)

## (undated) DEVICE — SUTURE 0 VICRYL PLUS CT-1 - 36 INCH (36/BX)

## (undated) DEVICE — SET EXTENSION WITH 2 PORTS (48EA/CA) ***PART #2C8610 IS A SUBSTITUTE*****

## (undated) DEVICE — TUBING CLEARLINK DUO-VENT - C-FLO (48EA/CA)

## (undated) DEVICE — BLADE STRAIGHT SINUS (5EA/PK)

## (undated) DEVICE — TAPE CLOTH MEDIPORE 6 INCH - (12RL/CA)

## (undated) DEVICE — SUTURE 2-0 VICRYL PLUS CT-1 36 (36PK/BX)"

## (undated) DEVICE — PACK C-SECTION (2EA/CA)

## (undated) DEVICE — PROTECTOR ULNA NERVE - (36PR/CA)

## (undated) DEVICE — MEDICINE CUP STERILE 2 OZ - (100/CA)

## (undated) DEVICE — HEAD HOLDER JUNIOR/ADULT

## (undated) DEVICE — SPLINT NASAL DOYLE AIRWAY (2EA/ST)

## (undated) DEVICE — GLOVE BIOGEL INDICATOR SZ 7SURGICAL PF LTX - (50/BX 4BX/CA)

## (undated) DEVICE — BALL COTTON STERILE 5/PK - (5/PK 25PK/CA)

## (undated) DEVICE — SET LEADWIRE 5 LEAD BEDSIDE DISPOSABLE ECG (1SET OF 5/EA)

## (undated) DEVICE — GOWN SURGEONS LARGE - (32/CA)

## (undated) DEVICE — STAPLER SKIN DISP - (6/BX 10BX/CA) VISISTAT

## (undated) DEVICE — TUBE E-T HI-LO CUFF 6.5MM (10EA/BX)

## (undated) DEVICE — ELECTRODE 850 FOAM ADHESIVE - HYDROGEL RADIOTRNSPRNT (50/PK)

## (undated) DEVICE — SUTURE 0 VICRYL PLUS CT 36 (36PK/BX)"

## (undated) DEVICE — WATER IRRIGATION STERILE 1000ML (12EA/CA)

## (undated) DEVICE — MASK ANESTHESIA ADULT  - (100/CA)

## (undated) DEVICE — GLOVE BIOGEL INDICATOR SZ 7.5 SURGICAL PF LTX - (50PR/BX 4BX/CA)

## (undated) DEVICE — GLOVE BIOGEL SZ 7.5 SURGICAL PF LTX - (50PR/BX 4BX/CA)

## (undated) DEVICE — SUTURE 3-0 CHROMIC SH ST-1 - ABS DBL (36/BX)

## (undated) DEVICE — ELECTRODE DUAL RETURN W/ CORD - (50/PK)

## (undated) DEVICE — SUTURE 2-0 SILK FS (12EA/BX)

## (undated) DEVICE — SLEEVE, SEQUENTIAL CALF REG

## (undated) DEVICE — GLOVE SZ 7 BIOGEL PI MICRO - PF LF (50PR/BX 4BX/CA)

## (undated) DEVICE — CANISTER SUCTION 3000ML MECHANICAL FILTER AUTO SHUTOFF MEDI-VAC NONSTERILE LF DISP  (40EA/CA)

## (undated) DEVICE — SUCTION INSTRUMENT YANKAUER BULBOUS TIP W/O VENT (50EA/CA)

## (undated) DEVICE — DRESSING NASOPORE 8CM STD - (8EA/PK)

## (undated) DEVICE — TRACKER ENT PATIENT

## (undated) DEVICE — DRAPE MAYO STAND - (30/CA)

## (undated) DEVICE — TUBE CONNECTING SUCTION - CLEAR PLASTIC STERILE 72 IN (50EA/CA)

## (undated) DEVICE — CATHETER IV 20 GA X 1-1/4 ---SURG.& SDS ONLY--- (50EA/BX)

## (undated) DEVICE — CANISTER SUCTION RIGID RED 1500CC (40EA/CA)

## (undated) DEVICE — CHLORAPREP 26 ML APPLICATOR - ORANGE TINT(25/CA)

## (undated) DEVICE — DRESSING POST OP BORDER 4 X 10 (5EA/BX)

## (undated) DEVICE — PACK ENT OR - (2EA/CA)

## (undated) DEVICE — LACTATED RINGERS INJ 1000 ML - (14EA/CA 60CA/PF)

## (undated) DEVICE — NEPTUNE 4 PORT MANIFOLD - (20/PK)

## (undated) DEVICE — SUTURE 5-0 CHROMIC GUT PS-5 - (12/BX) 18 INCH

## (undated) DEVICE — PATTIES SURG X-RAYCOTTONOID - 1/2 X 3 IN (200/CA)

## (undated) DEVICE — KIT  I.V. START (100EA/CA)

## (undated) DEVICE — SUTURE 4-0 27IN VCRL PLUS ANTI (36PK/BX)

## (undated) DEVICE — SUTURE GENERAL

## (undated) DEVICE — BLANKET UNDERBODY FULL ACCES - (5/CA)

## (undated) DEVICE — GLOVE BIOGEL INDICATOR SZ 8 SURGICAL PF LTX - (50/BX 4BX/CA)